# Patient Record
Sex: FEMALE | Race: WHITE | NOT HISPANIC OR LATINO | Employment: FULL TIME | URBAN - NONMETROPOLITAN AREA
[De-identification: names, ages, dates, MRNs, and addresses within clinical notes are randomized per-mention and may not be internally consistent; named-entity substitution may affect disease eponyms.]

---

## 2017-02-16 DIAGNOSIS — Z12.31 VISIT FOR SCREENING MAMMOGRAM: Primary | ICD-10-CM

## 2017-02-16 PROCEDURE — G0202 SCR MAMMO BI INCL CAD: HCPCS | Mod: TC | Performed by: RADIOLOGY

## 2017-02-24 DIAGNOSIS — R51.9 HEADACHE: ICD-10-CM

## 2017-02-27 RX ORDER — SUMATRIPTAN 100 MG/1
TABLET, FILM COATED ORAL
Qty: 10 TABLET | Refills: 1 | Status: SHIPPED | OUTPATIENT
Start: 2017-02-27 | End: 2017-05-01

## 2017-05-01 ENCOUNTER — OFFICE VISIT (OUTPATIENT)
Dept: FAMILY MEDICINE | Facility: OTHER | Age: 52
End: 2017-05-01
Attending: PHYSICIAN ASSISTANT
Payer: COMMERCIAL

## 2017-05-01 VITALS
HEIGHT: 61 IN | HEART RATE: 72 BPM | DIASTOLIC BLOOD PRESSURE: 78 MMHG | OXYGEN SATURATION: 98 % | SYSTOLIC BLOOD PRESSURE: 128 MMHG | WEIGHT: 161 LBS | BODY MASS INDEX: 30.4 KG/M2 | TEMPERATURE: 97.8 F

## 2017-05-01 DIAGNOSIS — H93.19 TINNITUS, UNSPECIFIED LATERALITY: ICD-10-CM

## 2017-05-01 DIAGNOSIS — Z12.11 SPECIAL SCREENING FOR MALIGNANT NEOPLASMS, COLON: ICD-10-CM

## 2017-05-01 DIAGNOSIS — Z11.59 NEED FOR HEPATITIS C SCREENING TEST: ICD-10-CM

## 2017-05-01 DIAGNOSIS — L30.9 PERIOCULAR DERMATITIS: ICD-10-CM

## 2017-05-01 DIAGNOSIS — G44.031 INTRACTABLE EPISODIC PAROXYSMAL HEMICRANIA: ICD-10-CM

## 2017-05-01 DIAGNOSIS — Z01.419 WELL WOMAN EXAM: Primary | ICD-10-CM

## 2017-05-01 DIAGNOSIS — Z71.89 ACP (ADVANCE CARE PLANNING): Chronic | ICD-10-CM

## 2017-05-01 DIAGNOSIS — E03.8 OTHER SPECIFIED HYPOTHYROIDISM: ICD-10-CM

## 2017-05-01 DIAGNOSIS — R42 VERTIGO: ICD-10-CM

## 2017-05-01 DIAGNOSIS — F32.0 MAJOR DEPRESSIVE DISORDER, SINGLE EPISODE, MILD (H): ICD-10-CM

## 2017-05-01 DIAGNOSIS — Z13.220 LIPID SCREENING: ICD-10-CM

## 2017-05-01 LAB
ALBUMIN SERPL-MCNC: 4 G/DL (ref 3.4–5)
ALBUMIN UR-MCNC: NEGATIVE MG/DL
ALP SERPL-CCNC: 100 U/L (ref 40–150)
ALT SERPL W P-5'-P-CCNC: 27 U/L (ref 0–50)
ANION GAP SERPL CALCULATED.3IONS-SCNC: 5 MMOL/L (ref 3–14)
APPEARANCE UR: CLEAR
AST SERPL W P-5'-P-CCNC: 17 U/L (ref 0–45)
BACTERIA #/AREA URNS HPF: ABNORMAL /HPF
BASOPHILS # BLD AUTO: 0.1 10E9/L (ref 0–0.2)
BASOPHILS NFR BLD AUTO: 0.8 %
BILIRUB SERPL-MCNC: 0.3 MG/DL (ref 0.2–1.3)
BILIRUB UR QL STRIP: NEGATIVE
BUN SERPL-MCNC: 10 MG/DL (ref 7–30)
CALCIUM SERPL-MCNC: 9.3 MG/DL (ref 8.5–10.1)
CHLORIDE SERPL-SCNC: 104 MMOL/L (ref 94–109)
CHOLEST SERPL-MCNC: 234 MG/DL
CO2 SERPL-SCNC: 30 MMOL/L (ref 20–32)
COLOR UR AUTO: ABNORMAL
CREAT SERPL-MCNC: 0.75 MG/DL (ref 0.52–1.04)
DIFFERENTIAL METHOD BLD: NORMAL
EOSINOPHIL # BLD AUTO: 0.5 10E9/L (ref 0–0.7)
EOSINOPHIL NFR BLD AUTO: 6.9 %
ERYTHROCYTE [DISTWIDTH] IN BLOOD BY AUTOMATED COUNT: 13 % (ref 10–15)
GFR SERPL CREATININE-BSD FRML MDRD: 81 ML/MIN/1.7M2
GLUCOSE SERPL-MCNC: 92 MG/DL (ref 70–99)
GLUCOSE UR STRIP-MCNC: NEGATIVE MG/DL
HCT VFR BLD AUTO: 42.1 % (ref 35–47)
HDLC SERPL-MCNC: 91 MG/DL
HGB BLD-MCNC: 14.2 G/DL (ref 11.7–15.7)
HGB UR QL STRIP: NEGATIVE
IMM GRANULOCYTES # BLD: 0 10E9/L (ref 0–0.4)
IMM GRANULOCYTES NFR BLD: 0.4 %
KETONES UR STRIP-MCNC: NEGATIVE MG/DL
LDLC SERPL CALC-MCNC: 110 MG/DL
LEUKOCYTE ESTERASE UR QL STRIP: NEGATIVE
LYMPHOCYTES # BLD AUTO: 2 10E9/L (ref 0.8–5.3)
LYMPHOCYTES NFR BLD AUTO: 28.6 %
MCH RBC QN AUTO: 29.6 PG (ref 26.5–33)
MCHC RBC AUTO-ENTMCNC: 33.7 G/DL (ref 31.5–36.5)
MCV RBC AUTO: 88 FL (ref 78–100)
MONOCYTES # BLD AUTO: 0.6 10E9/L (ref 0–1.3)
MONOCYTES NFR BLD AUTO: 8.8 %
NEUTROPHILS # BLD AUTO: 3.8 10E9/L (ref 1.6–8.3)
NEUTROPHILS NFR BLD AUTO: 54.5 %
NITRATE UR QL: NEGATIVE
NONHDLC SERPL-MCNC: 143 MG/DL
NRBC # BLD AUTO: 0 10*3/UL
NRBC BLD AUTO-RTO: 0 /100
PH UR STRIP: 6 PH (ref 4.7–8)
PLATELET # BLD AUTO: 304 10E9/L (ref 150–450)
POTASSIUM SERPL-SCNC: 3.9 MMOL/L (ref 3.4–5.3)
PROT SERPL-MCNC: 8.2 G/DL (ref 6.8–8.8)
RBC # BLD AUTO: 4.79 10E12/L (ref 3.8–5.2)
RBC #/AREA URNS AUTO: <1 /HPF (ref 0–2)
SODIUM SERPL-SCNC: 139 MMOL/L (ref 133–144)
SP GR UR STRIP: 1.01 (ref 1–1.03)
TRIGL SERPL-MCNC: 163 MG/DL
TSH SERPL DL<=0.005 MIU/L-ACNC: 0.85 MU/L (ref 0.4–4)
URN SPEC COLLECT METH UR: ABNORMAL
UROBILINOGEN UR STRIP-MCNC: NORMAL MG/DL (ref 0–2)
WBC # BLD AUTO: 7.1 10E9/L (ref 4–11)
WBC #/AREA URNS AUTO: <1 /HPF (ref 0–2)

## 2017-05-01 PROCEDURE — 80061 LIPID PANEL: CPT | Performed by: PHYSICIAN ASSISTANT

## 2017-05-01 PROCEDURE — 36415 COLL VENOUS BLD VENIPUNCTURE: CPT | Performed by: PHYSICIAN ASSISTANT

## 2017-05-01 PROCEDURE — 86803 HEPATITIS C AB TEST: CPT | Mod: 90 | Performed by: PHYSICIAN ASSISTANT

## 2017-05-01 PROCEDURE — 99000 SPECIMEN HANDLING OFFICE-LAB: CPT | Performed by: PHYSICIAN ASSISTANT

## 2017-05-01 PROCEDURE — 81001 URINALYSIS AUTO W/SCOPE: CPT | Performed by: PHYSICIAN ASSISTANT

## 2017-05-01 PROCEDURE — 99396 PREV VISIT EST AGE 40-64: CPT | Performed by: PHYSICIAN ASSISTANT

## 2017-05-01 PROCEDURE — 80050 GENERAL HEALTH PANEL: CPT | Performed by: PHYSICIAN ASSISTANT

## 2017-05-01 RX ORDER — SUMATRIPTAN 100 MG/1
TABLET, FILM COATED ORAL
Qty: 10 TABLET | Refills: 1 | Status: SHIPPED | OUTPATIENT
Start: 2017-05-01 | End: 2018-03-02

## 2017-05-01 RX ORDER — NEOMYCIN SULFATE, POLYMYXIN B SULFATE, AND DEXAMETHASONE 3.5; 10000; 1 MG/G; [USP'U]/G; MG/G
OINTMENT OPHTHALMIC
Qty: 2 TUBE | Refills: 1 | Status: SHIPPED | OUTPATIENT
Start: 2017-05-01 | End: 2020-07-30

## 2017-05-01 ASSESSMENT — ANXIETY QUESTIONNAIRES
2. NOT BEING ABLE TO STOP OR CONTROL WORRYING: NOT AT ALL
IF YOU CHECKED OFF ANY PROBLEMS ON THIS QUESTIONNAIRE, HOW DIFFICULT HAVE THESE PROBLEMS MADE IT FOR YOU TO DO YOUR WORK, TAKE CARE OF THINGS AT HOME, OR GET ALONG WITH OTHER PEOPLE: NOT DIFFICULT AT ALL
1. FEELING NERVOUS, ANXIOUS, OR ON EDGE: NOT AT ALL
3. WORRYING TOO MUCH ABOUT DIFFERENT THINGS: NOT AT ALL
7. FEELING AFRAID AS IF SOMETHING AWFUL MIGHT HAPPEN: NOT AT ALL
GAD7 TOTAL SCORE: 0
6. BECOMING EASILY ANNOYED OR IRRITABLE: NOT AT ALL
5. BEING SO RESTLESS THAT IT IS HARD TO SIT STILL: NOT AT ALL

## 2017-05-01 ASSESSMENT — PAIN SCALES - GENERAL: PAINLEVEL: NO PAIN (0)

## 2017-05-01 ASSESSMENT — PATIENT HEALTH QUESTIONNAIRE - PHQ9: 5. POOR APPETITE OR OVEREATING: NOT AT ALL

## 2017-05-01 ASSESSMENT — ENCOUNTER SYMPTOMS: DIZZINESS: 1

## 2017-05-01 NOTE — MR AVS SNAPSHOT
After Visit Summary   5/1/2017    Mattie Edwards    MRN: [id number]           Patient Information     Date Of Birth          1965        Visit Information        Provider Department      5/1/2017 11:00 AM Tracey Leary PA [facility]        Today's Diagnoses     Well woman exam    -  1    ACP (advance care planning)        Vertigo        Tinnitus, unspecified laterality        Lipid screening        Special screening for malignant neoplasms, colon        Need for hepatitis C screening test        Intractable episodic paroxysmal hemicrania        Periocular dermatitis        hypothyroidism        Major depressive disorder, single episode, mild (H)          Care Instructions      Preventive Health Recommendations  Female Ages 50 - 64    Yearly exam: See your health care provider every year in order to  o Review health changes.   o Discuss preventive care.    o Review your medicines if your doctor has prescribed any.      Get a Pap test every three years (unless you have an abnormal result and your provider advises testing more often).    If you get Pap tests with HPV test, you only need to test every 5 years, unless you have an abnormal result.     You do not need a Pap test if your uterus was removed (hysterectomy) and you have not had cancer.    You should be tested each year for STDs (sexually transmitted diseases) if you're at risk.     Have a mammogram every 1 to 2 years.    Have a colonoscopy at age 50, or have a yearly FIT test (stool test). These exams screen for colon cancer.      Have a cholesterol test every 5 years, or more often if advised.    Have a diabetes test (fasting glucose) every three years. If you are at risk for diabetes, you should have this test more often.     If you are at risk for osteoporosis (brittle bone disease), think about having a bone density scan (DEXA).    Shots: Get a flu shot each year. Get a tetanus shot every 10 years.    Nutrition:      Eat at least 5 servings of fruits and vegetables each day.    Eat whole-grain bread, whole-wheat pasta and brown rice instead of white grains and rice.    Talk to your provider about Calcium and Vitamin D.     Lifestyle    Exercise at least 150 minutes a week (30 minutes a day, 5 days a week). This will help you control your weight and prevent disease.    Limit alcohol to one drink per day.    No smoking.     Wear sunscreen to prevent skin cancer.     See your dentist every six months for an exam and cleaning.    See your eye doctor every 1 to 2 years.          Follow-ups after your visit        Additional Services     OTOLARYNGOLOGY REFERRAL       Your provider has referred you to: Srini Bhatt [phone]   [email or web address]    Please be aware that coverage of these services is subject to the terms and limitations of your health insurance plan.  Call member services at your health plan with any benefit or coverage questions.      Please bring the following with you to your appointment:    (1) Any X-Rays, CTs or MRIs which have been performed.  Contact the facility where they were done to arrange for  prior to your scheduled appointment.   (2) List of current medications  (3) This referral request   (4) Any documents/labs given to you for this referral                  Future tests that were ordered for you today     Open Future Orders        Priority Expected Expires Ordered    Immunos occult blood Routine  5/1/2018 5/1/2017            Who to contact     If you have questions or need follow up information about today's clinic visit or your schedule please contact [facility] directly at [phone].  Normal or non-critical lab and imaging results will be communicated to you by MyChart, letter or phone within 4 business days after the clinic has received the results. If you do not hear from us within 7 days, please  "contact the clinic through Wercker or phone. If you have a critical or abnormal lab result, we will notify you by phone as soon as possible.  Submit refill requests through Wercker or call your pharmacy and they will forward the refill request to us. Please allow 3 business days for your refill to be completed.          Additional Information About Your Visit        PhoneAndPhoneharAdvise Only Information     Wercker gives you secure access to your electronic health record. If you see a primary care provider, you can also send messages to your care team and make appointments. If you have questions, please call your primary care clinic.  If you do not have a primary care provider, please call [phone] and they will assist you.        Care EveryWhere ID     This is your Care EveryWhere ID. This could be used by other organizations to access your [facility] medical records  [id number]        Your Vitals Were     Pulse Temperature Height Last Period Pulse Oximetry Breastfeeding?    72 97.8  F (36.6  C) (Tympanic) 5' 1\" (1.549 m) 05/15/2015 98% No    BMI (Body Mass Index)                   30.42 kg/m2            Blood Pressure from Last 3 Encounters:   05/01/17 128/78   12/16/16 118/80   11/23/16 120/70    Weight from Last 3 Encounters:   05/01/17 161 lb (73 kg)   12/16/16 149 lb (67.6 kg)   11/23/16 155 lb (70.3 kg)              We Performed the Following     CBC with platelets differential     Comprehensive metabolic panel     Hepatitis C antibody     Lipid Profile     MR Brain w Contrast     OTOLARYNGOLOGY REFERRAL     TSH with free T4 reflex     UA with Microscopic reflex to Culture          Today's Medication Changes          These changes are accurate as of: 5/1/17 11:56 AM.  If you have any questions, ask your nurse or doctor.               These medicines have changed or have updated prescriptions.        Dose/Directions    SUMAtriptan 100 MG tablet   Commonly known as:  IMITREX   This may have changed:  See the new " instructions.   Used for:  Intractable episodic paroxysmal hemicrania   Changed by:  Tracey Leary PA        TAKE ONE TABLET BY MOUTH AS NEEDED   Quantity:  10 tablet   Refills:  1            Where to get your medicines      These medications were sent to [id number] [place] [facility] - [address] AT [facility] [address], [place] [address]     Phone:  [phone]     neomycin-polymyxin-dexamethasone 3.5-31146-3.1 Oint ophthalmic ointment    sertraline 50 MG tablet    SUMAtriptan 100 MG tablet                Primary Care Provider Office Phone # Fax #    MUKESH Wallis [phone]       [facility] [address] [facility] [address]  [place] [address]        Thank you!     Thank you for choosing [facility]  for your care. Our goal is always to provide you with excellent care. Hearing back from our patients is one way we can continue to improve our services. Please take a few minutes to complete the written survey that you may receive in the mail after your visit with us. Thank you!             Your Updated Medication List - Protect others around you: Learn how to safely use, store and throw away your medicines at www.disposemymeds.org.          This list is accurate as of: 5/1/17 11:56 AM.  Always use your most recent med list.                   Brand Name Dispense Instructions for use    Bernard THYROID 30 MG tablet   Generic drug:  thyroid     90 tablet    TAKE 1 TABLET(30 MG) BY MOUTH DAILY       diazepam 2 MG tablet    VALIUM    20 tablet    Take 1 tablet (2 mg) by mouth every 12 hours as needed for anxiety or sleep       loratadine 10 MG tablet    CLARITIN    30 tablet    Take 1 tablet (10 mg) by mouth daily       meclizine 25 MG tablet    ANTIVERT    15 tablet    Take 1 tablet (25 mg) by mouth 3 times daily as needed for dizziness       mometasone 50 MCG/ACT spray    NASONEX    1 Box    Spray 2 sprays into both nostrils daily        neomycin-polymyxin-dexamethasone 3.5-23307-2.1 Oint ophthalmic ointment    MAXITROL    2 Tube    Apply 1/2 ribbon to periocular area 3-4 times a day for one week, then use as needed for severe itching       pimecrolimus 1 % cream    ELIDEL    60 g    Apply topically 2 times daily       sertraline 50 MG tablet    ZOLOFT    90 tablet    Take 1 tablet (50 mg) by mouth daily       SUMAtriptan 100 MG tablet    IMITREX    10 tablet    TAKE ONE TABLET BY MOUTH AS NEEDED       ZOLPIDEM TARTRATE PO      Take by mouth nightly as needed for sleep

## 2017-05-01 NOTE — PROGRESS NOTES
SUBJECTIVE:     CC: Mattie Edwards is an 51 year old woman who presents for preventive health visit.     Physical   Annual:     Getting at least 3 servings of Calcium per day::  Yes    Bi-annual eye exam::  Yes    Dental care twice a year::  Yes    Sleep apnea or symptoms of sleep apnea::  Daytime drowsiness and Excessive snoring    Diet::  Regular (no restrictions)    Frequency of exercise::  2-3 days/week    Duration of exercise::  15-30 minutes    Taking medications regularly::  Yes    Medication side effects::  Not applicable    Additional concerns today::  YES  Dizziness           Dizziness      Duration: 1 year     Description   Feeling faint:  YES- eyes are not focusing   Feeling like the surroundings are moving: YES  Loss of consciousness or falls: no     Intensity:  moderate    Accompanying signs and symptoms:   Nausea/vomitting: YES  Palpitations: no   Weakness in arms or legs: no   Vision or speech changes: YES- vision   Ringing in ears (Tinnitus): YES  Hearing loss related to dizziness: no   Other (fevers/chills/sweating/dyspnea): no     History (similar episodes/head trauma/previous evaluation/recent bleeding): Started last May 2016    Precipitating or alleviating factors (new meds/chemicals): happens in the afternoon after eating   Worse with activity/head movement: YES    Therapies tried and outcome: None    Today's PHQ-2 Score:   PHQ-2 ( 1999 Pfizer) 4/28/2017   Q1: Little interest or pleasure in doing things -   Q2: Feeling down, depressed or hopeless -   PHQ-2 Score -   Little interest or pleasure in doing things Not at all   Feeling down, depressed or hopeless Not at all   PHQ-2 Score 0       Abuse: Current or Past(Physical, Sexual or Emotional)- No  Do you feel safe in your environment - Yes    Social History   Substance Use Topics     Smoking status: Never Smoker     Smokeless tobacco: Never Used      Comment: no passive exposure     Alcohol use No     The patient does not drink >3 drinks  per day nor >7 drinks per week.    Recent Labs   Lab Test  01/28/16   1046  12/04/14   1132  10/03/13   0830   CHOL  275*  272*  232*   HDL  102  125  98*   LDL  158*  125  115   TRIG  75  110  97   CHOLHDLRATIO   --   2.2  2.4*   NHDL  173*   --    --        Reviewed orders with patient.  Reviewed health maintenance and updated orders accordingly - Yes    Mammo Decision Support:  Patient over age 50, mutual decision to screen reflected in health maintenance.    Pertinent mammograms are reviewed under the imaging tab.  History of abnormal Pap smear: NO - age 30-65 PAP every 5 years with negative HPV co-testing recommended    Reviewed and updated as needed this visit by clinical staff  Tobacco  Allergies  Meds  Med Hx  Surg Hx  Fam Hx  Soc Hx        Reviewed and updated as needed this visit by Provider        Past Medical History:   Diagnosis Date     Major depressive disorder, single episode, mild (H) 9/19/2011     Thyroid disease       Past Surgical History:   Procedure Laterality Date     NO HISTORY OF SURGERY         ROS:  C: NEGATIVE for fever, chills, change in weight  I: NEGATIVE for worrisome rashes, moles or lesions  E: NEGATIVE for vision changes or irritation  ENT: NEGATIVE for ear, mouth and throat problems  R: NEGATIVE for significant cough or SOB  B: NEGATIVE for masses, tenderness or discharge  CV: NEGATIVE for chest pain, palpitations or peripheral edema  GI: NEGATIVE for nausea, abdominal pain, heartburn, or change in bowel habits  : NEGATIVE for unusual urinary or vaginal symptoms. No vaginal bleeding.  M: NEGATIVE for significant arthralgias or myalgia  NEURO: dizziness is present on and off all year over 5 episodes that are debilitating.   Different type of vertigo than when she was on thyroid medications.     E: NEGATIVE for temperature intolerance, skin/hair changes  H: NEGATIVE for bleeding problems  P: NEGATIVE for changes in mood or affect     Problem list, Medication list, Allergies,  and Medical/Social/Surgical histories reviewed in [facility] and updated as appropriate.  Labs reviewed in EPIC  BP Readings from Last 3 Encounters:   05/01/17 128/78   12/16/16 118/80   11/23/16 120/70    Wt Readings from Last 3 Encounters:   05/01/17 161 lb (73 kg)   12/16/16 149 lb (67.6 kg)   11/23/16 155 lb (70.3 kg)                  Patient Active Problem List   Diagnosis     Major depressive disorder, single episode, mild (H)     Left Thyroid nodules     hypothyroidism     Perennial allergic rhinitis     Chronic maxillary sinusitis     Chronic ethmoidal sinusitis     Nasal turbinate hypertrophy     ACP (advance care planning)     Past Surgical History:   Procedure Laterality Date     NO HISTORY OF SURGERY         Social History   Substance Use Topics     Smoking status: Never Smoker     Smokeless tobacco: Never Used      Comment: no passive exposure     Alcohol use No     Family History   Problem Relation Age of Onset     CANCER Father      brain; cause of death     Myocardial Infarction Paternal Grandfather      Myocardial Infarction Maternal Grandmother      Thyroid Cancer Cousin      Hypertension Other      family history     Sleep Apnea Other      family history     Seasonal/Environmental Allergies Other      family history     Snoring Other      family history         Current Outpatient Prescriptions   Medication Sig Dispense Refill     SUMAtriptan (IMITREX) 100 MG tablet TAKE ONE TABLET BY MOUTH AS NEEDED 10 tablet 1     ARMOUR THYROID 30 MG tablet TAKE 1 TABLET(30 MG) BY MOUTH DAILY 90 tablet 1     meclizine (ANTIVERT) 25 MG tablet Take 1 tablet (25 mg) by mouth 3 times daily as needed for dizziness 15 tablet 0     mometasone (NASONEX) 50 MCG/ACT nasal spray Spray 2 sprays into both nostrils daily 1 Box 11     diazepam (VALIUM) 2 MG tablet Take 1 tablet (2 mg) by mouth every 12 hours as needed for anxiety or sleep 20 tablet 0     neomycin-polymyxin-dexamethasone (MAXITROL) 3.5-40253-8.1 OINT ophthalmic  "ointment Apply 1/2 ribbon to periocular area 3-4 times a day for one week, then use as needed for severe itching 2 Tube 1     sertraline (ZOLOFT) 50 MG tablet Take 1 tablet (50 mg) by mouth daily 90 tablet 3     ZOLPIDEM TARTRATE PO Take by mouth nightly as needed for sleep       loratadine (CLARITIN) 10 MG tablet Take 1 tablet (10 mg) by mouth daily (Patient taking differently: Take 10 mg by mouth daily ) 30 tablet 1     pimecrolimus (ELIDEL) 1 % cream Apply topically 2 times daily 60 g 0     No Known Allergies  OBJECTIVE:     /78 (BP Location: Left arm, Patient Position: Chair, Cuff Size: Adult Large)  Pulse 72  Temp 97.8  F (36.6  C) (Tympanic)  Ht 5' 1\" (1.549 m)  Wt 161 lb (73 kg)  LMP 05/15/2015  SpO2 98%  Breastfeeding? No  BMI 30.42 kg/m2  EXAM:  GENERAL APPEARANCE: healthy, alert and no distress  EYES: Eyes grossly normal to inspection, PERRL and conjunctivae and sclerae normal  HENT: ear canals and TM's normal, nose and mouth without ulcers or lesions, oropharynx clear and oral mucous membranes moist  NECK: no adenopathy, no asymmetry, masses, or scars and thyroid normal to palpation  RESP: lungs clear to auscultation - no rales, rhonchi or wheezes  BREAST: normal without masses, tenderness or nipple discharge and no palpable axillary masses or adenopathy  CV: regular rate and rhythm, normal S1 S2, no S3 or S4, no murmur, click or rub, no peripheral edema and peripheral pulses strong  ABDOMEN: soft, nontender, no hepatosplenomegaly, no masses and bowel sounds normal  MS: no musculoskeletal defects are noted and gait is age appropriate without ataxia  SKIN: no suspicious lesions or rashes  NEURO: Normal strength and tone, sensory exam grossly normal, mentation intact and speech normal  PSYCH: mentation appears normal and affect normal/bright, Discussion  on stress at work.      ASSESSMENT/PLAN:     1. ACP (advance care planning)  Health care directive she has will get us a copy to file.     2. " Well woman exam  Mammogram is ok. Willing to do IFOB.  discussion on calcium and Vit D due to recent.   Menopause.   - Comprehensive metabolic panel  - Lipid Profile  - CBC with platelets differential  - UA with Microscopic reflex to Culture    3. Vertigo  still an issue.  See ENT again and we will get MRI.   - MR Brain w Contrast; Future  - MR Brain w Contrast  - Comprehensive metabolic panel  - TSH with free T4 reflex  - CBC with platelets differential    4. Tinnitus, unspecified laterality  She is going to be given below.  See ENT again.  This is a different type of vertigo and also associated with tinnitus when it is happening.  Consider Meniere   - MR Brain w Contrast; Future  - MR Brain w Contrast  - Comprehensive metabolic panel  - TSH with free T4 reflex  - CBC with platelets differential    5. Lipid screening  Due for this.   - Lipid Profile    6. Special screening for malignant neoplasms, colon  Given.   - Immunos occult blood; Future    7. Need for hepatitis C screening test  Done.   - Hepatitis C antibody    8. Intractable episodic paroxysmal hemicrania  Refill, better in Menopause.   - SUMAtriptan (IMITREX) 100 MG tablet; TAKE ONE TABLET BY MOUTH AS NEEDED  Dispense: 10 tablet; Refill: 1    9. Periocular dermatitis  Refill given.   - neomycin-polymyxin-dexamethasone (MAXITROL) 3.5-11970-7.1 OINT ophthalmic ointment; Apply 1/2 ribbon to periocular area 3-4 times a day for one week, then use as needed for severe itching  Dispense: 2 Tube; Refill: 1    10. hypothyroidism  Recheck on [place] Thyroid tolerated better.   - TSH with free T4 reflex    11. Major depressive disorder, single episode, mild (H)  Refill. Helping menopausal sx.   - sertraline (ZOLOFT) 50 MG tablet; Take 1 tablet (50 mg) by mouth daily  Dispense: 90 tablet; Refill: 3              COUNSELING:  Reviewed preventive health counseling, as reflected in patient instructions       Regular exercise       Healthy diet/nutrition       Vision  "screening       Hearing screening       Colon cancer screening       Consider Hep C screening for patients born between 1945 and 1965       (Dominique)menopause management       Advance Care Planning         reports that she has never smoked. She has never used smokeless tobacco.    Estimated body mass index is 30.42 kg/(m^2) as calculated from the following:    Height as of this encounter: 5' 1\" (1.549 m).    Weight as of this encounter: 161 lb (73 kg).       Counseling Resources:  ATP IV Guidelines  Pooled Cohorts Equation Calculator  Breast Cancer Risk Calculator  FRAX Risk Assessment  ICSI Preventive Guidelines  Dietary Guidelines for Americans, 2010  [employer]'s MyPlate  ASA Prophylaxis  Lung CA Screening    MUKESH Leon  [facility] [place]  Answers for HPI/ROS submitted by the patient on 4/28/2017   Q1: Little interest or pleasure in doing things: 0=Not at all  Q2: Feeling down, depressed or hopeless: 0=Not at all  PHQ-2 Score: 0    "

## 2017-05-02 ASSESSMENT — ANXIETY QUESTIONNAIRES: GAD7 TOTAL SCORE: 0

## 2017-05-02 ASSESSMENT — PATIENT HEALTH QUESTIONNAIRE - PHQ9: SUM OF ALL RESPONSES TO PHQ QUESTIONS 1-9: 7

## 2017-05-03 DIAGNOSIS — H91.93 DECREASED HEARING OF BOTH EARS: Primary | ICD-10-CM

## 2017-05-03 DIAGNOSIS — R42 VERTIGO: Primary | ICD-10-CM

## 2017-05-03 DIAGNOSIS — H93.19 TINNITUS, UNSPECIFIED LATERALITY: ICD-10-CM

## 2017-05-03 LAB — HCV AB SERPL QL IA: NORMAL

## 2017-05-04 DIAGNOSIS — H91.93 DECREASED HEARING, BILATERAL: Primary | ICD-10-CM

## 2017-05-08 ENCOUNTER — HOSPITAL ENCOUNTER (OUTPATIENT)
Dept: MRI IMAGING | Facility: HOSPITAL | Age: 52
Discharge: HOME | End: 2017-05-08
Attending: PHYSICIAN ASSISTANT | Admitting: PHYSICIAN ASSISTANT
Payer: COMMERCIAL

## 2017-05-08 PROCEDURE — A9585 GADOBUTROL INJECTION: HCPCS | Mod: TC

## 2017-05-08 PROCEDURE — 70553 MRI BRAIN STEM W/O & W/DYE: CPT | Mod: TC

## 2017-05-08 RX ORDER — GADOBUTROL 604.72 MG/ML
7.5 INJECTION INTRAVENOUS ONCE
Status: COMPLETED | OUTPATIENT
Start: 2017-05-08 | End: 2017-05-08

## 2017-05-08 RX ADMIN — GADOBUTROL 7.5 ML: 604.72 INJECTION INTRAVENOUS at 12:58

## 2017-05-09 DIAGNOSIS — Z12.11 SPECIAL SCREENING FOR MALIGNANT NEOPLASMS, COLON: ICD-10-CM

## 2017-05-09 PROCEDURE — 82274 ASSAY TEST FOR BLOOD FECAL: CPT | Performed by: PHYSICIAN ASSISTANT

## 2017-05-11 LAB — HEMOCCULT SP1 STL QL: NEGATIVE

## 2017-06-02 ENCOUNTER — OFFICE VISIT (OUTPATIENT)
Dept: AUDIOLOGY | Facility: OTHER | Age: 52
End: 2017-06-02
Attending: AUDIOLOGIST
Payer: COMMERCIAL

## 2017-06-02 ENCOUNTER — OFFICE VISIT (OUTPATIENT)
Dept: OTOLARYNGOLOGY | Facility: OTHER | Age: 52
End: 2017-06-02
Attending: AUDIOLOGIST
Payer: COMMERCIAL

## 2017-06-02 VITALS
SYSTOLIC BLOOD PRESSURE: 118 MMHG | BODY MASS INDEX: 28.32 KG/M2 | OXYGEN SATURATION: 97 % | WEIGHT: 150 LBS | HEIGHT: 61 IN | DIASTOLIC BLOOD PRESSURE: 76 MMHG | TEMPERATURE: 98.1 F | HEART RATE: 76 BPM

## 2017-06-02 DIAGNOSIS — H93.13 TINNITUS, BILATERAL: ICD-10-CM

## 2017-06-02 DIAGNOSIS — R42 DIZZINESS: ICD-10-CM

## 2017-06-02 DIAGNOSIS — J30.89 PERENNIAL ALLERGIC RHINITIS, UNSPECIFIED ALLERGIC RHINITIS TRIGGER: ICD-10-CM

## 2017-06-02 DIAGNOSIS — R42 VERTIGO: ICD-10-CM

## 2017-06-02 DIAGNOSIS — H93.13 TINNITUS, BILATERAL: Primary | ICD-10-CM

## 2017-06-02 PROCEDURE — 99214 OFFICE O/P EST MOD 30 MIN: CPT | Mod: 25 | Performed by: PHYSICIAN ASSISTANT

## 2017-06-02 PROCEDURE — 92570 ACOUSTIC IMMITANCE TESTING: CPT | Performed by: AUDIOLOGIST

## 2017-06-02 PROCEDURE — 92557 COMPREHENSIVE HEARING TEST: CPT | Performed by: AUDIOLOGIST

## 2017-06-02 PROCEDURE — 92504 EAR MICROSCOPY EXAMINATION: CPT | Performed by: PHYSICIAN ASSISTANT

## 2017-06-02 ASSESSMENT — PAIN SCALES - GENERAL: PAINLEVEL: NO PAIN (0)

## 2017-06-02 NOTE — MR AVS SNAPSHOT
After Visit Summary   6/2/2017    Mattie Edwards    MRN: [id number]           Patient Information     Date Of Birth          1965        Visit Information        Provider Department      6/2/2017 8:15 AM Sharon Bazzi AuD [facility]        Today's Diagnoses     Tinnitus, bilateral        Dizziness           Follow-ups after your visit        Your next 10 appointments already scheduled     Jun 02, 2017  8:45 AM CDT   (Arrive by 8:30 AM)   Return Visit with Fariba Lara PA-C   [facility] ([facility])    [address] [place] [address] [place]  [facility] [id number] [phone]            Jun 07, 2017 10:00 AM CDT   Evaluation with Fariba Tan PT   [facility] ([facility] )    [address] [id number] [phone]              Who to contact     If you have questions or need follow up information about today's clinic visit or your schedule please contact [facility] directly at [phone].  Normal or non-critical lab and imaging results will be communicated to you by [email or web address], letter or phone within 4 business days after the clinic has received the results. If you do not hear from us within 7 days, please contact the clinic through [email or web address] or phone. If you have a critical or abnormal lab result, we will notify you by phone as soon as possible.  Submit refill requests through Lipperhey or call your pharmacy and they will forward the refill request to us. Please allow 3 business days for your refill to be completed.          Additional Information About Your Visit        MyChart Information     Lipperhey gives you secure access to your electronic health record. If you see a primary care provider, you can also send messages to your care team and make appointments. If you have questions, please call your primary care clinic.  If you do not have a primary care provider, please call [phone] and they will assist you.        Care  EveryWhere ID     This is your Care EveryWhere ID. This could be used by other organizations to access your [facility] medical records  [id number]        Your Vitals Were     Last Period                   05/15/2015            Blood Pressure from Last 3 Encounters:   05/01/17 128/78   12/16/16 118/80   11/23/16 120/70    Weight from Last 3 Encounters:   05/01/17 161 lb (73 kg)   12/16/16 149 lb (67.6 kg)   11/23/16 155 lb (70.3 kg)              We Performed the Following     AUDIOGRAM/TYMPANOGRAM - INTERFACE        Primary Care Provider Office Phone # Fax #    MUKESH Wallis [phone]       [facility] [address] [address]        Thank you!     Thank you for choosing [facility]  for your care. Our goal is always to provide you with excellent care. Hearing back from our patients is one way we can continue to improve our services. Please take a few minutes to complete the written survey that you may receive in the mail after your visit with us. Thank you!             Your Updated Medication List - Protect others around you: Learn how to safely use, store and throw away your medicines at www.disposemymeds.org.          This list is accurate as of: 6/2/17  8:41 AM.  Always use your most recent med list.                   Brand Name Dispense Instructions for use    ARMOUR THYROID 30 MG tablet   Generic drug:  thyroid     90 tablet    TAKE 1 TABLET(30 MG) BY MOUTH DAILY       diazepam 2 MG tablet    VALIUM    20 tablet    Take 1 tablet (2 mg) by mouth every 12 hours as needed for anxiety or sleep       loratadine 10 MG tablet    CLARITIN    30 tablet    Take 1 tablet (10 mg) by mouth daily       meclizine 25 MG tablet    ANTIVERT    15 tablet    Take 1 tablet (25 mg) by mouth 3 times daily as needed for dizziness       mometasone 50 MCG/ACT spray    NASONEX    1 Box    Spray 2 sprays into both nostrils daily       neomycin-polymyxin-dexamethasone  3.5-84821-9.1 Oint ophthalmic ointment    MAXITROL    2 Tube    Apply 1/2 ribbon to periocular area 3-4 times a day for one week, then use as needed for severe itching       pimecrolimus 1 % cream    ELIDEL    60 g    Apply topically 2 times daily       sertraline 50 MG tablet    ZOLOFT    90 tablet    Take 1 tablet (50 mg) by mouth daily       SUMAtriptan 100 MG tablet    IMITREX    10 tablet    TAKE ONE TABLET BY MOUTH AS NEEDED       ZOLPIDEM TARTRATE PO      Take by mouth nightly as needed for sleep

## 2017-06-02 NOTE — PROGRESS NOTES
Chief Complaint   Patient presents with     Consult     vertigo and tinnitus- sameer Phelps returns for complaints of dizzines/ vertigo/ tinnitus. She was last seen last year in March for similar complaints. She has ongoing symptoms, overall no improvement. She was seen in ED in May 2016 with rotarty vertigo and was treated with Valium and Zofran. Since, she has episodes of acute onset. She has felt spinnig comes on all of a sudden. Reports some balance issues as well.   ED work up was negative.   She has had a a spell about 1 times every 2 months. She had developed rotarty vertigo, she has symptoms taht last for hours and then pass. No monique provoking factors.   She has associated nausea and emesis related.   She has noticed change in hearing, tinnitus. She has some aural fullness however related to allergies likely per patient.   Describes a 'sick feeling.'   No COM or otologic surgeries.     Caffeine intake- 2 cans pop daily  ETOH- rarely/ social  Tobacco- None  Sodium- unknown.       Audiogram  Type A tympanograms.   Thresholds are within normal range. Excellent word discrim.     Past Medical History:   Diagnosis Date     Major depressive disorder, single episode, mild (H) 9/19/2011     Thyroid disease       No Known Allergies  Current Outpatient Prescriptions   Medication     ARMOUR THYROID 30 MG tablet     SUMAtriptan (IMITREX) 100 MG tablet     neomycin-polymyxin-dexamethasone (MAXITROL) 3.5-84369-5.1 OINT ophthalmic ointment     sertraline (ZOLOFT) 50 MG tablet     meclizine (ANTIVERT) 25 MG tablet     mometasone (NASONEX) 50 MCG/ACT nasal spray     diazepam (VALIUM) 2 MG tablet     ZOLPIDEM TARTRATE PO     loratadine (CLARITIN) 10 MG tablet     pimecrolimus (ELIDEL) 1 % cream     No current facility-administered medications for this visit.       ROS: 10 point ROS neg other than the symptoms noted above in the HPI.  /76 (BP Location: Left arm, Patient Position: Chair, Cuff Size: Adult Regular)  " Pulse 76  Temp 98.1  F (36.7  C) (Tympanic)  Ht 5' 1\" (1.549 m)  Wt 150 lb (68 kg)  LMP 05/15/2015  SpO2 97%  BMI 28.34 kg/m2    General - The patient is well nourished and well developed, and appears to have good nutritional status. Alert and oriented to person and place, answers questions and cooperates with examination appropriately.   Head and Face - Normocephalic and atraumatic, with no gross asymmetry noted. The facial nerve is intact, with strong symmetric movements.  Voice and Breathing - The patient was breathing comfortably without the use of accessory muscles. There was no wheezing, stridor, or stertor. The patients voice was clear and strong, and had appropriate pitch and quality.  Ears -The external auditory canals are patent, the tympanic membranes are intact without effusion, retraction or mass. Bony landmarks are intact. Ears examined under microscopy.   Eyes - Extraocular movements intact, and the pupils were reactive to light. Sclera were not icteric or injected, conjunctiva were pink and moist.  Mouth - Examination of the oral cavity showed pink, healthy oral mucosa. No lesions or ulcerations noted. The tongue was mobile and midline, and the dentition were in good condition.   Throat - The walls of the oropharynx were smooth, pink, moist, symmetric, and had no lesions or ulcerations. The tonsillar pillars and soft palate were symmetric. The uvula was midline on elevation.   Neck - Normal midline excursion of the laryngotracheal complex during swallowing. Full range of motion on passive movement. Palpation of the occipital, submental, submandibular, internal jugular chain, and supraclavicular nodes did not demonstrate any abnormal lymph nodes or masses. Palpation of the thyroid was irregular without palpable dominant nodule or goiter. The trachea was mobile and midline.  Nose - External contour is symmetric, no gross deflection or scars. Nasal mucosa edematous, severe ITH, MTH, The septum " was intact, No polyps, masses, or purulence noted on examination.  NEURO:  equal  strength, neg Romberg, DTR II/IV bilaterally (UE and LE), finger to nose normal, CN intact, ambulates without difficulty.  no focal deficits noted. Michael Hallpike Negative.        ASSESSMENT:    ICD-10-CM    1. Tinnitus, bilateral H93.13    2. Dizziness R42    3. Vertigo R42     Meniere   4. Perennial allergic rhinitis, unspecified allergic rhinitis trigger J30.89          WIll contact radiology to review past MRI to rule out inner ear  Spoke to Dr. Pina today (6/3/17 @1105 AM). Negative IAC.   Start Vestibular therapy.     Start daily Zyrtec or Claritin  Follow CATSS.   If no improvement with above, will start low dose diuretic. I discussed with Mattie, to complete VT testing to ensure no BPPV present. I do not appreciate a positive Michael Hallpike today, but would recommend further testing.   Follow up in 3-4 weeks She will start following the CATSS at this time. Reviewed with patient and eduction provided. She does have normal hearing, but several symptoms of Meniere's noted.    I spent the remainder of today's visit educating the patient about the current theory on the cause of Meniere's Disease and the reasoning behind its treatment.  We discussed the CATS (Caffeine, Alcohol, Tobacco, Salt/Stress) Avoidance Diet, as well as safety measures to be done at home to avoid injury.  Finally, the variability, potential permanent hearing loss, and natural course of Meniere's disease, including 30% incidence of eventual bilateral involvement, was also discussed.    The use of Dyazide as well as Potassium replacement was also discussed as the next step in treatment.  The patient expressed understanding of today's discussion, and will follow up in 1 month.  I plan on doing audiograms every 6 months for the first 2 years, then yearly assuming they remain stable.    The last item discussed was the possible indications for an MRI scan of  the brain and internal auditory canals.  Although the chances of unilateral hearing loss and vertigo eventually being diagnosed as a vestibular schwannoma or other intracranial pathology was quoted as being approximately 1 in 400, consideration of an MRI in the situation of persistent or worsening symptoms is still possible necessary.      Fariba Lara PA-C  ENT  [facility], [place]  [phone]

## 2017-06-02 NOTE — NURSING NOTE
"Chief Complaint   Patient presents with     Consult     vertigo and tinnitus- sameer       Initial /76 (BP Location: Left arm, Patient Position: Chair, Cuff Size: Adult Regular)  Pulse 76  Temp 98.1  F (36.7  C) (Tympanic)  Ht 5' 1\" (1.549 m)  Wt 150 lb (68 kg)  LMP 05/15/2015  SpO2 97%  BMI 28.34 kg/m2 Estimated body mass index is 28.34 kg/(m^2) as calculated from the following:    Height as of this encounter: 5' 1\" (1.549 m).    Weight as of this encounter: 150 lb (68 kg).  Medication Reconciliation: complete       Nuzhat Lockhart LPN      "

## 2017-06-02 NOTE — MR AVS SNAPSHOT
After Visit Summary   6/2/2017    Mattie Edwards    MRN: [id number]           Patient Information     Date Of Birth          1965        Visit Information        Provider Department      6/2/2017 8:45 AM Fariba Lara PA-C [facility]        Today's Diagnoses     Tinnitus, bilateral    -  1      Care Instructions    WIll contact radiology to review past MRI to rule out inner ear  Start Vestibular therapy.     Start daily Zyrtec or Claritin  Follow CATSS.   If no improvement with above, will start low dose diuretic.   If there are concerns or questions, Call [phone] and ask for nurse      I spent the remainder of today's visit educating the patient about the current theory on the cause of Meniere's Disease and the reasoning behind its treatment.  We discussed the CATS (Caffeine, Alcohol, Tobacco, Salt/Stress) Avoidance Diet, as well as safety measures to be done at home to avoid injury.  Finally, the variability, potential permanent hearing loss, and natural course of Meniere's disease, including 30% incidence of eventual bilateral involvement, was also discussed.    The use of Dyazide as well as Potassium replacement was also discussed as the next step in treatment.  The patient expressed understanding of today's discussion, and will follow up in 1 month.  I plan on doing audiograms every 6 months for the first 2 years, then yearly assuming they remain stable.    The last item discussed was the possible indications for an MRI scan of the brain and internal auditory canals.  Although the chances of unilateral hearing loss and vertigo eventually being diagnosed as a vestibular schwannoma or other intracranial pathology was quoted as being approximately 1 in 400, consideration of an MRI in the situation of persistent or worsening symptoms is still possible necessary.          Follow-ups after your visit        Follow-up notes from your care team     Return in about 3 weeks (around  "6/23/2017).      Your next 10 appointments already scheduled     Jun 07, 2017 10:00 AM CDT   Evaluation with Fariba Tan, PT   [facility] ([facility] )    [address] [place] [id number]   [phone]              Who to contact     If you have questions or need follow up information about today's clinic visit or your schedule please contact [facility] directly at [phone].  Normal or non-critical lab and imaging results will be communicated to you by IntraStagehart, letter or phone within 4 business days after the clinic has received the results. If you do not hear from us within 7 days, please contact the clinic through [employer] or phone. If you have a critical or abnormal lab result, we will notify you by phone as soon as possible.  Submit refill requests through [employer] or call your pharmacy and they will forward the refill request to us. Please allow 3 business days for your refill to be completed.          Additional Information About Your Visit        [employer] Information     [employer] gives you secure access to your electronic health record. If you see a primary care provider, you can also send messages to your care team and make appointments. If you have questions, please call your primary care clinic.  If you do not have a primary care provider, please call [phone] and they will assist you.        Care EveryWhere ID     This is your Care EveryWhere ID. This could be used by other organizations to access your [place] medical records  [id number]        Your Vitals Were     Pulse Temperature Height Last Period Pulse Oximetry BMI (Body Mass Index)    76 98.1  F (36.7  C) (Tympanic) 5' 1\" (1.549 m) 05/15/2015 97% 28.34 kg/m2       Blood Pressure from Last 3 Encounters:   06/02/17 118/76   05/01/17 128/78   12/16/16 118/80    Weight from Last 3 Encounters:   06/02/17 150 lb (68 kg)   05/01/17 161 lb (73 kg)   12/16/16 149 lb (67.6 kg)              Today, you " had the following     No orders found for display       Primary Care Provider Office Phone # Fax #    Tracey NEWTON MUKESH Leary [phone]       [facility] [address] [place] [id number]        Thank you!     Thank you for choosing [facility]  for your care. Our goal is always to provide you with excellent care. Hearing back from our patients is one way we can continue to improve our services. Please take a few minutes to complete the written survey that you may receive in the mail after your visit with us. Thank you!             Your Updated Medication List - Protect others around you: Learn how to safely use, store and throw away your medicines at www.disposemymeds.org.          This list is accurate as of: 6/2/17  9:16 AM.  Always use your most recent med list.                   Brand Name Dispense Instructions for use    ARMOUR THYROID 30 MG tablet   Generic drug:  thyroid     90 tablet    TAKE 1 TABLET(30 MG) BY MOUTH DAILY       diazepam 2 MG tablet    VALIUM    20 tablet    Take 1 tablet (2 mg) by mouth every 12 hours as needed for anxiety or sleep       loratadine 10 MG tablet    CLARITIN    30 tablet    Take 1 tablet (10 mg) by mouth daily       meclizine 25 MG tablet    ANTIVERT    15 tablet    Take 1 tablet (25 mg) by mouth 3 times daily as needed for dizziness       mometasone 50 MCG/ACT spray    NASONEX    1 Box    Spray 2 sprays into both nostrils daily       neomycin-polymyxin-dexamethasone 3.5-56770-1.1 Oint ophthalmic ointment    MAXITROL    2 Tube    Apply 1/2 ribbon to periocular area 3-4 times a day for one week, then use as needed for severe itching       pimecrolimus 1 % cream    ELIDEL    60 g    Apply topically 2 times daily       sertraline 50 MG tablet    ZOLOFT    90 tablet    Take 1 tablet (50 mg) by mouth daily       SUMAtriptan 100 MG tablet    IMITREX    10 tablet    TAKE ONE TABLET BY MOUTH AS NEEDED       ZOLPIDEM TARTRATE PO      Take  by mouth nightly as needed for sleep

## 2017-06-02 NOTE — PROGRESS NOTES
Audiology Evaluation Completed. Please refer SCANNED AUDIOGRAM and/or TYMPANOGRAM for BACKGROUND, RESULTS, RECOMMENDATIONS.    UNDER RECOMMENDATIONS ON AUDIOGRAM PATIENT REFERRED TO ENT WITH SYMPTOMS      Sharon HUERTA, [facility]  Audiologist #[id number]

## 2017-06-02 NOTE — PATIENT INSTRUCTIONS
WIll contact radiology to review past MRI to rule out inner ear  Start Vestibular therapy.     Start daily Zyrtec or Claritin  Follow CATSS.   If no improvement with above, will start low dose diuretic.   If there are concerns or questions, Call [phone] and ask for nurse      I spent the remainder of today's visit educating the patient about the current theory on the cause of Meniere's Disease and the reasoning behind its treatment.  We discussed the CATS (Caffeine, Alcohol, Tobacco, Salt/Stress) Avoidance Diet, as well as safety measures to be done at home to avoid injury.  Finally, the variability, potential permanent hearing loss, and natural course of Meniere's disease, including 30% incidence of eventual bilateral involvement, was also discussed.    The use of Dyazide as well as Potassium replacement was also discussed as the next step in treatment.  The patient expressed understanding of today's discussion, and will follow up in 1 month.  I plan on doing audiograms every 6 months for the first 2 years, then yearly assuming they remain stable.    The last item discussed was the possible indications for an MRI scan of the brain and internal auditory canals.  Although the chances of unilateral hearing loss and vertigo eventually being diagnosed as a vestibular schwannoma or other intracranial pathology was quoted as being approximately 1 in 400, consideration of an MRI in the situation of persistent or worsening symptoms is still possible necessary.

## 2017-06-07 ENCOUNTER — HOSPITAL ENCOUNTER (OUTPATIENT)
Dept: PHYSICAL THERAPY | Facility: HOSPITAL | Age: 52
Setting detail: THERAPIES SERIES
End: 2017-06-07
Attending: PHYSICIAN ASSISTANT
Payer: COMMERCIAL

## 2017-06-07 PROCEDURE — 40000719 ZZHC STATISTIC PT DEPARTMENT NEURO VISIT

## 2017-06-07 PROCEDURE — 97530 THERAPEUTIC ACTIVITIES: CPT | Mod: GP

## 2017-06-07 PROCEDURE — 97161 PT EVAL LOW COMPLEX 20 MIN: CPT | Mod: GP

## 2017-06-07 PROCEDURE — 97112 NEUROMUSCULAR REEDUCATION: CPT | Mod: GP

## 2017-06-07 NOTE — PROGRESS NOTES
06/07/17 0900   Quick Adds   Quick Adds Vestibular Eval   Type of Visit Initial OP PT Evaluation   General Information   Start of Care Date 06/07/17   Referring Physician Tracey MAYORGA   Orders Evaluate and Treat as Indicated   Order Date 06/07/17   Medical Diagnosis vertigo and tinnitus   Onset of illness/injury or Date of Surgery 05/24/16   Surgical/Medical history reviewed Yes   Pertinent history of current vestibular problem (include personal factors and/or comorbidities that impact the POC)  Depression;Migraines   Pertinent history of current problem (include personal factors and/or comorbidities that impact the POC) Client states she had a vertiginous episode one year ago adn just last week again. Both required meds to level out. She also has a history of insomnia, hypothyroid and tinnitus.   Pertinent Visual History  contacts and cheaters   Prior level of function comment   full time   Patient role/Employment history Employed   [facility] [place]   Patient/Family Goals Statement to get better   Fall Risk Screen   Per patient - Fall 2 or more times in past year? No   Per patient - Fall with injury in past year? No   Is patient a fall risk? No   Pain   Patient currently in pain No   Cognitive Status Examination   Orientation orientation to person, place and time   Level of Consciousness alert   Follows Commands and Answers Questions 100% of the time   Personal Safety and Judgment intact   Memory intact   Observation   Observation no distress   Integumentary   Integumentary No deficits were identified   Posture   Posture Forward head position   Posture Comments cranial flexion of occiput   Range of Motion (ROM)   ROM Quick Adds no deficits were identified   Strength   Manual Muscle Testing Quick Adds No deficits were identified   Strength Comments limbs WFL core 4/5   Bed Mobility   Bed Mobility Comments independent   Transfer Skills   Transfer Comments independent   Gait   Gait  Comments gait no issues other than feeling like loss of balance at times   Balance   Balance Comments balance is functional  but I want to look closer with Mathur for dynamic gait when more time   Sensory Examination   Sensory Perception no deficits were identified   Coordination   Coordination no deficits were identified   Muscle Tone   Muscle Tone no deficits were identified   Oculomotor Exam   Smooth Pursuit Normal   Saccades Normal   VOR Abnormal   VOR Cancellation Abnormal   Rapid Head Thrust Comments right worse than left   Convergence Testing Normal   Infrared Goggle Exam or Frenzel Lense Exam   Vestibular Suppressant in Last 24 Hours? No   Exam completed with Room Light   Spontaneous Nystagmus Negative   Gaze Evoked Nystagmus Negative   Head Shake Horizontal Nystagmus Negative   Positional testing Negative   [place]-Hallpike (right) Negative   [place]-Hallpike (Left) Negative   HSCC Supine Roll Test (Right) Negative   HSCC Supine Roll Test (Left) Negative   Dynamic Visual Acuity (DVA)   Static Acuity (LogMar) line 10   Horizontal Head Movement at 2 Hz (LogMar) line 7   Planned Therapy Interventions   Planned Therapy Interventions balance training;neuromuscular re-education;manual therapy;strengthening   Planned Therapy Interventions Comment ther act   Clinical Impression   Criteria for Skilled Therapeutic Interventions Met yes, treatment indicated   PT Diagnosis decreased vestibular compensation as well as poor posture and balance   Clinical Presentation Stable/Uncomplicated   Clinical Presentation Rationale clinical obs   Clinical Decision Making (Complexity) Low complexity   Therapy Frequency 2 times/Week   Predicted Duration of Therapy Intervention (days/wks) 12 weeks   Risk & Benefits of therapy have been explained Yes   Patient, Family & other staff in agreement with plan of care Yes   Clinical Impression Comments Presents with decreased compensation balance as well as focus vision with head turns. She has  vertigo and postural components that are affecting her as well as possible allergies and food sensitity.   Education Assessment   Preferred Learning Style Listening;Demonstration   Barriers to Learning No barriers   Goal 1   Goal Identifier functional   Goal Description Client will have corrected posture with no somatic dysfunction for better sitting and tolerance for computer work.   Target Date 08/02/17   Goal 2   Goal Identifier functional   Goal Description Client will have minimal to no vertigo with head turns at work or in a community envirornment.   Target Date 08/30/17   Goal 3   Goal Identifier functional   Goal Description Client will have improved DVA 2 a two line differenence for improved vision compensation with head turns for a more stable community function.   Target Date 08/30/17   Goal 4   Goal Identifier long   Goal Description Client will be independent with HEP and knowledge to control vertiginous symptoms.   Target Date 08/30/17   Total Evaluation Time   Total Evaluation Time (Minutes) 25

## 2017-06-13 ENCOUNTER — HOSPITAL ENCOUNTER (OUTPATIENT)
Dept: PHYSICAL THERAPY | Facility: HOSPITAL | Age: 52
Setting detail: THERAPIES SERIES
End: 2017-06-13
Attending: PHYSICIAN ASSISTANT
Payer: COMMERCIAL

## 2017-06-13 PROCEDURE — 97112 NEUROMUSCULAR REEDUCATION: CPT | Mod: GP

## 2017-06-13 PROCEDURE — 97110 THERAPEUTIC EXERCISES: CPT | Mod: GP

## 2017-06-13 PROCEDURE — 40000719 ZZHC STATISTIC PT DEPARTMENT NEURO VISIT

## 2017-06-20 ENCOUNTER — HOSPITAL ENCOUNTER (OUTPATIENT)
Dept: PHYSICAL THERAPY | Facility: HOSPITAL | Age: 52
Setting detail: THERAPIES SERIES
End: 2017-06-20
Attending: PHYSICIAN ASSISTANT
Payer: COMMERCIAL

## 2017-06-20 ENCOUNTER — TELEPHONE (OUTPATIENT)
Dept: FAMILY MEDICINE | Facility: OTHER | Age: 52
End: 2017-06-20

## 2017-06-20 PROCEDURE — 97110 THERAPEUTIC EXERCISES: CPT | Mod: GP

## 2017-06-20 PROCEDURE — 40000719 ZZHC STATISTIC PT DEPARTMENT NEURO VISIT

## 2017-06-20 PROCEDURE — 97112 NEUROMUSCULAR REEDUCATION: CPT | Mod: GP

## 2017-06-20 NOTE — TELEPHONE ENCOUNTER
Mattie Link has seen a connection between sodium and caffeine and has been better at controlling her flare ups. Overall getting better. She was wondering about the diuretic and if that was something to proceed with. Fariba

## 2017-06-20 NOTE — TELEPHONE ENCOUNTER
Some patients are able to manage w. CATSS alone. However,s he should have a f/u upcoming. Depending on symptoms, we may try low dose HCTZ. TR

## 2017-07-20 ENCOUNTER — OFFICE VISIT (OUTPATIENT)
Dept: OTOLARYNGOLOGY | Facility: OTHER | Age: 52
End: 2017-07-20
Attending: PHYSICIAN ASSISTANT
Payer: COMMERCIAL

## 2017-07-20 VITALS
TEMPERATURE: 97.3 F | HEART RATE: 65 BPM | WEIGHT: 149 LBS | DIASTOLIC BLOOD PRESSURE: 70 MMHG | HEIGHT: 61 IN | BODY MASS INDEX: 28.13 KG/M2 | SYSTOLIC BLOOD PRESSURE: 108 MMHG

## 2017-07-20 DIAGNOSIS — H81.09 MENIERE'S DISEASE, UNSPECIFIED LATERALITY: ICD-10-CM

## 2017-07-20 DIAGNOSIS — H93.13 TINNITUS, BILATERAL: ICD-10-CM

## 2017-07-20 DIAGNOSIS — R42 VERTIGO: ICD-10-CM

## 2017-07-20 DIAGNOSIS — H81.09 MENIERE'S DISEASE, UNSPECIFIED LATERALITY: Primary | ICD-10-CM

## 2017-07-20 DIAGNOSIS — R42 DIZZINESS: ICD-10-CM

## 2017-07-20 PROCEDURE — 99214 OFFICE O/P EST MOD 30 MIN: CPT | Mod: 25 | Performed by: PHYSICIAN ASSISTANT

## 2017-07-20 PROCEDURE — 92504 EAR MICROSCOPY EXAMINATION: CPT | Performed by: PHYSICIAN ASSISTANT

## 2017-07-20 RX ORDER — HYDROCHLOROTHIAZIDE 12.5 MG/1
12.5 TABLET ORAL DAILY
Qty: 30 TABLET | Refills: 11 | Status: SHIPPED | OUTPATIENT
Start: 2017-07-20 | End: 2017-07-20

## 2017-07-20 RX ORDER — MECLIZINE HYDROCHLORIDE 25 MG/1
25 TABLET ORAL 3 TIMES DAILY PRN
Qty: 30 TABLET | Refills: 0 | Status: SHIPPED | OUTPATIENT
Start: 2017-07-20 | End: 2023-02-16

## 2017-07-20 ASSESSMENT — PAIN SCALES - GENERAL: PAINLEVEL: NO PAIN (0)

## 2017-07-20 NOTE — TELEPHONE ENCOUNTER
HCTZ       Last Written Prescription Date: 7/20/2017  Last Fill Quantity: 90, # refills: 11  Last Office Visit with G, P or [facility] prescribing provider: 5/01/2017       Potassium   Date Value Ref Range Status   05/01/2017 3.9 3.4 - 5.3 mmol/L Final     Creatinine   Date Value Ref Range Status   05/01/2017 0.75 0.52 - 1.04 mg/dL Final     BP Readings from Last 3 Encounters:   07/20/17 108/70   06/02/17 118/76   05/01/17 128/78

## 2017-07-20 NOTE — NURSING NOTE
"Chief Complaint   Patient presents with     Dizziness     Pt is here for a f/u dizziness/vertigo.  Pt was ordered vestibular rehab.  Pt states that it has improved.  Has not had any episodes since last here.       Initial /70 (BP Location: Right arm, Cuff Size: Adult Regular)  Pulse 65  Temp 97.3  F (36.3  C) (Tympanic)  Ht 5' 1\" (1.549 m)  Wt 149 lb (67.6 kg)  LMP 05/15/2015  BMI 28.15 kg/m2 Estimated body mass index is 28.15 kg/(m^2) as calculated from the following:    Height as of this encounter: 5' 1\" (1.549 m).    Weight as of this encounter: 149 lb (67.6 kg).  Medication Reconciliation: complete   Dawn Fay LPN      "

## 2017-07-20 NOTE — PROGRESS NOTES
"  Chief Complaint   Patient presents with     Dizziness     Pt is here for a f/u dizziness/vertigo.  Pt was ordered vestibular rehab.  Pt states that it has improved.  Has not had any episodes since last here.     She has been doing well. '  Less symptoms.   Denies concerns with recent attacks.   No fluctuating hearing loss. Tinnitus remains, ears occassionally feel plugged. Improvement with NA and caffeine reduction.   Completed PT w/ good results.   Past Medical History:   Diagnosis Date     Major depressive disorder, single episode, mild (H) 9/19/2011     Thyroid disease       No Known Allergies  Current Outpatient Prescriptions   Medication     ARMOUR THYROID 30 MG tablet     SUMAtriptan (IMITREX) 100 MG tablet     neomycin-polymyxin-dexamethasone (MAXITROL) 3.5-58830-4.1 OINT ophthalmic ointment     sertraline (ZOLOFT) 50 MG tablet     meclizine (ANTIVERT) 25 MG tablet     mometasone (NASONEX) 50 MCG/ACT nasal spray     ZOLPIDEM TARTRATE PO     loratadine (CLARITIN) 10 MG tablet     pimecrolimus (ELIDEL) 1 % cream     No current facility-administered medications for this visit.       ROS: 10 point ROS neg other than the symptoms noted above in the HPI.  /70 (BP Location: Right arm, Cuff Size: Adult Regular)  Pulse 65  Temp 97.3  F (36.3  C) (Tympanic)  Ht 5' 1\" (1.549 m)  Wt 149 lb (67.6 kg)  LMP 05/15/2015  BMI 28.15 kg/m2    General - The patient is well nourished and well developed, and appears to have good nutritional status. Alert and oriented to person and place, answers questions and cooperates with examination appropriately.   Head and Face - Normocephalic and atraumatic, with no gross asymmetry noted. The facial nerve is intact, with strong symmetric movements.  Voice and Breathing - The patient was breathing comfortably without the use of accessory muscles. There was no wheezing, stridor, or stertor. The patients voice was clear and strong, and had appropriate pitch and quality.  Ears -The " external auditory canals are patent, the tympanic membranes are intact without effusion, retraction or mass. Bony landmarks are intact. Ears examined under microscopy.   Eyes - Extraocular movements intact, and the pupils were reactive to light. Sclera were not icteric or injected, conjunctiva were pink and moist.  Mouth - Examination of the oral cavity showed pink, healthy oral mucosa. No lesions or ulcerations noted. The tongue was mobile and midline, and the dentition were in good condition.   Throat - The walls of the oropharynx were smooth, pink, moist, symmetric, and had no lesions or ulcerations. The tonsillar pillars and soft palate were symmetric. The uvula was midline on elevation.   Neck - Normal midline excursion of the laryngotracheal complex during swallowing. Full range of motion on passive movement. Palpation of the occipital, submental, submandibular, internal jugular chain, and supraclavicular nodes did not demonstrate any abnormal lymph nodes or masses. Palpation of the thyroid was irregular without palpable dominant nodule or goiter. The trachea was mobile and midline.  Nose - External contour is symmetric, no gross deflection or scars.   ASSESSMENT:    ICD-10-CM    1. Meniere's disease, unspecified laterality H81.09 meclizine (ANTIVERT) 25 MG tablet     hydrochlorothiazide 12.5 MG TABS tablet     Potassium   2. Vertigo R42 meclizine (ANTIVERT) 25 MG tablet     Potassium   3. Tinnitus, bilateral H93.13    4. Dizziness R42        Discussed with Mattie, she has been doing well since working on Na precautions, caffeine avoidance. Overall she has less symptoms. No vertigo attacks  Complete PT with good results  She wishes to try HCTZ  We discussed options and this time, will complete a trial period. Recheck K in 2 weeks.   She will continue w// precautions of CATSS. No fluctuating/ ASNHL on audiogram. However, her symptoms are felt to be greater related to Meniere's.     Meniere's disease is almost  always idiopathic or of no specific cause.  There is no cure at this time. A plan can be put forward to ease the patient's symptoms.  Limiting salt intake is essential in a person's diet.   Tests such as an ENG  and a radiographic work-up to include an MRI can be helpful.  Diuretics help reduce fluid buildup in a patient's inner ear. Antivert will reduce symptoms as well.  If symptoms are recalcitrant to this type of management surgical options exist as well.    Reduction of stress along with avoidance of caffeine, alcohol and tobacco will often help manage symptoms of Meniere's disease.  A pamphlet on Meniere's disease was given to and reviewed with the patient.          Fariba Lara PA-C  ENT  [facility], [place]  [phone]

## 2017-07-20 NOTE — MR AVS SNAPSHOT
After Visit Summary   7/20/2017    Mattie Edwards    MRN: [id number]           Patient Information     Date Of Birth          1965        Visit Information        Provider Department      7/20/2017 11:15 AM Fariba Lara PA-C [facility]        Today's Diagnoses     Meniere's disease, unspecified laterality    -  1    Vertigo          Care Instructions    Start HCTZ 12.5 mg one tablet daily  Monitor sodium intake/ caffeine.   Recheck potassium in 2 weeks.       I spent the remainder of today's visit educating the patient about the current theory on the cause of Meniere's Disease and the reasoning behind its treatment.  We discussed the CATS (Caffeine, Alcohol, Tobacco, Salt/Stress) Avoidance Diet, as well as safety measures to be done at home to avoid injury.  Finally, the variability, potential permanent hearing loss, and natural course of Meniere's disease, including 30% incidence of eventual bilateral involvement, was also discussed.    The use of Dyazide/ HCTZ as well as Potassium replacement was also discussed as the next step in treatment.  The patient expressed understanding of today's discussion, and will follow up in 1 month.  I plan on doing audiograms every 6 months for the first 2 years, then yearly assuming they remain stable.    If there are concerns or questions, Call [phone] and ask for nurse            Follow-ups after your visit        Follow-up notes from your care team     Return in about 3 months (around 10/20/2017).      Who to contact     If you have questions or need follow up information about today's clinic visit or your schedule please contact [facility] directly at [phone].  Normal or non-critical lab and imaging results will be communicated to you by MyChart, letter or phone within 4 business days after the clinic has received the results. If you do not hear from us within 7 days, please contact the clinic through MyChart or phone. If  "you have a critical or abnormal lab result, we will notify you by phone as soon as possible.  Submit refill requests through [employer] or call your pharmacy and they will forward the refill request to us. Please allow 3 business days for your refill to be completed.          Additional Information About Your Visit        [employer] Information     [employer] gives you secure access to your electronic health record. If you see a primary care provider, you can also send messages to your care team and make appointments. If you have questions, please call your primary care clinic.  If you do not have a primary care provider, please call [phone] and they will assist you.        Care EveryWhere ID     This is your Care EveryWhere ID. This could be used by other organizations to access your [facility] medical records  [id number]        Your Vitals Were     Pulse Temperature Height Last Period BMI (Body Mass Index)       65 97.3  F (36.3  C) (Tympanic) 5' 1\" (1.549 m) 05/15/2015 28.15 kg/m2        Blood Pressure from Last 3 Encounters:   07/20/17 108/70   06/02/17 118/76   05/01/17 128/78    Weight from Last 3 Encounters:   07/20/17 149 lb (67.6 kg)   06/02/17 150 lb (68 kg)   05/01/17 161 lb (73 kg)              Today, you had the following     No orders found for display         Today's Medication Changes          These changes are accurate as of: 7/20/17 11:26 AM.  If you have any questions, ask your nurse or doctor.               Start taking these medicines.        Dose/Directions    hydrochlorothiazide 12.5 MG Tabs tablet   Used for:  Meniere's disease, unspecified laterality   Started by:  Fariba Lara PA-C        Dose:  12.5 mg   Take 1 tablet (12.5 mg) by mouth daily   Quantity:  30 tablet   Refills:  11            Where to get your medicines      These medications were sent to [id number] - [place]  [address] AT [facility] [address] [address]     Phone:  " [phone]     hydrochlorothiazide 12.5 MG Tabs tablet    meclizine 25 MG tablet                Primary Care Provider Office Phone # Fax #    Tracey NEWTON MUKESH Leary [phone]       [facility] [address] [facility] [address]  [facility] [address]        Equal Access to Services     NATASHA PAUL : Hadii aad ku hadasho Soomaali, waaxda luqadaha, qaybta kaalmada adeegyada, waxay idiin hayaan adeeg seraeva lakaitlynn . So [facility] [phone].    ATENCIÓN: Si habla español, tiene a potter disposición servicios gratuitos de asistencia lingüística. Robert al [phone].    We comply with applicable federal civil rights laws and [place] laws. We do not discriminate on the basis of race, color, national origin, age, disability sex, sexual orientation or gender identity.            Thank you!     Thank you for choosing [facility]  for your care. Our goal is always to provide you with excellent care. Hearing back from our patients is one way we can continue to improve our services. Please take a few minutes to complete the written survey that you may receive in the mail after your visit with us. Thank you!             Your Updated Medication List - Protect others around you: Learn how to safely use, store and throw away your medicines at www.disposemymeds.org.          This list is accurate as of: 7/20/17 11:26 AM.  Always use your most recent med list.                   Brand Name Dispense Instructions for use Diagnosis    ARMOUR THYROID 30 MG tablet   Generic drug:  thyroid     90 tablet    TAKE 1 TABLET(30 MG) BY MOUTH DAILY    Hypothyroidism       hydrochlorothiazide 12.5 MG Tabs tablet     30 tablet    Take 1 tablet (12.5 mg) by mouth daily    Meniere's disease, unspecified laterality       loratadine 10 MG tablet    CLARITIN    30 tablet    Take 1 tablet (10 mg) by mouth daily    Allergic reaction, initial encounter       meclizine 25 MG tablet    ANTIVERT    30 tablet    Take 1 tablet (25 mg) by  mouth 3 times daily as needed for dizziness    Meniere's disease, unspecified laterality, Vertigo       mometasone 50 MCG/ACT spray    NASONEX    1 Box    Spray 2 sprays into both nostrils daily    Recurrent sinus infections       neomycin-polymyxin-dexamethasone 3.5-61982-3.1 Oint ophthalmic ointment    MAXITROL    2 Tube    Apply 1/2 ribbon to periocular area 3-4 times a day for one week, then use as needed for severe itching    Periocular dermatitis       pimecrolimus 1 % cream    ELIDEL    60 g    Apply topically 2 times daily    Allergic reaction, initial encounter       sertraline 50 MG tablet    ZOLOFT    90 tablet    Take 1 tablet (50 mg) by mouth daily    Major depressive disorder, single episode, mild (H)       SUMAtriptan 100 MG tablet    IMITREX    10 tablet    TAKE ONE TABLET BY MOUTH AS NEEDED    Intractable episodic paroxysmal hemicrania       ZOLPIDEM TARTRATE PO      Take by mouth nightly as needed for sleep

## 2017-07-20 NOTE — PATIENT INSTRUCTIONS
Start HCTZ 12.5 mg one tablet daily  Monitor sodium intake/ caffeine.   Recheck potassium in 2 weeks.       I spent the remainder of today's visit educating the patient about the current theory on the cause of Meniere's Disease and the reasoning behind its treatment.  We discussed the CATS (Caffeine, Alcohol, Tobacco, Salt/Stress) Avoidance Diet, as well as safety measures to be done at home to avoid injury.  Finally, the variability, potential permanent hearing loss, and natural course of Meniere's disease, including 30% incidence of eventual bilateral involvement, was also discussed.    The use of Dyazide/ HCTZ as well as Potassium replacement was also discussed as the next step in treatment.  The patient expressed understanding of today's discussion, and will follow up in 1 month.  I plan on doing audiograms every 6 months for the first 2 years, then yearly assuming they remain stable.    If there are concerns or questions, Call [phone] and ask for nurse

## 2017-07-24 RX ORDER — HYDROCHLOROTHIAZIDE 12.5 MG/1
TABLET ORAL
Qty: 90 TABLET | Refills: 2 | Status: SHIPPED | OUTPATIENT
Start: 2017-07-24 | End: 2018-07-13

## 2017-08-08 DIAGNOSIS — R42 VERTIGO: ICD-10-CM

## 2017-08-08 DIAGNOSIS — H81.09 MENIERE'S DISEASE, UNSPECIFIED LATERALITY: ICD-10-CM

## 2017-08-08 LAB — POTASSIUM SERPL-SCNC: 3.9 MMOL/L (ref 3.4–5.3)

## 2017-08-08 PROCEDURE — 84132 ASSAY OF SERUM POTASSIUM: CPT | Performed by: PHYSICIAN ASSISTANT

## 2017-08-08 PROCEDURE — 36415 COLL VENOUS BLD VENIPUNCTURE: CPT | Performed by: PHYSICIAN ASSISTANT

## 2017-08-19 NOTE — PROGRESS NOTES
Outpatient Physical Therapy Discharge Note     Patient: Mattie Edwards  : 1965    Beginning/End Dates of Reporting Period:  2017to 2017    Referring Provider: Fariba Christy Diagnosis: decreased balance compensation and vertigo  Client Self Report: states she is much better as of her last appointment with me . SHe did see Fariba Lara after that and expressed was doing well .    Objective Measurements:                                      I with HEP I had given her thus far but we did not complete into Gaze 2 exercises but she expressed a month later that she was doing well when she saw ENT.       Outcome Measures (most recent score):      Goals:  Goal Identifier functional   Goal Description Client will have corrected posture with no somatic dysfunction for better sitting and tolerance for computer work.   Target Date 17   Date Met      Progress:     Goal Identifier functional   Goal Description Client will have minimal to no vertigo with head turns at work or in a community envirornment.   Target Date 17   Date Met      Progress:     Goal Identifier functional   Goal Description Client will have improved DVA 2 a two line differenence for improved vision compensation with head turns for a more stable community function.   Target Date 17   Date Met      Progress:     Goal Identifier long   Goal Description Client will be independent with HEP and knowledge to control vertiginous symptoms.   Target Date 17   Date Met      Progress:     Goal Identifier     Goal Description     Target Date     Date Met      Progress:     Goal Identifier     Goal Description     Target Date     Date Met      Progress:     Goal Identifier     Goal Description     Target Date     Date Met      Progress:     Goal Identifier     Goal Description     Target Date     Date Met      Progress:     Progress Toward Goals:   Progress limited due to only a few visits but did  well.    Plan:  Discharge from therapy.    Discharge:    Reason for Discharge: Patient has failed to schedule further appointments.    Equipment Issued:     Discharge Plan: Patient to continue home program.

## 2017-10-08 ENCOUNTER — HOSPITAL ENCOUNTER (EMERGENCY)
Facility: HOSPITAL | Age: 52
Discharge: HOME | End: 2017-10-08
Attending: NURSE PRACTITIONER | Admitting: NURSE PRACTITIONER
Payer: COMMERCIAL

## 2017-10-08 VITALS
TEMPERATURE: 98.8 F | DIASTOLIC BLOOD PRESSURE: 61 MMHG | SYSTOLIC BLOOD PRESSURE: 132 MMHG | HEART RATE: 88 BPM | RESPIRATION RATE: 20 BRPM | OXYGEN SATURATION: 98 %

## 2017-10-08 DIAGNOSIS — L92.9 PROUD FLESH: ICD-10-CM

## 2017-10-08 DIAGNOSIS — L03.032 PARONYCHIA OF GREAT TOE, LEFT: ICD-10-CM

## 2017-10-08 PROCEDURE — 25000132 ZZH RX MED GY IP 250 OP 250 PS 637: Performed by: NURSE PRACTITIONER

## 2017-10-08 PROCEDURE — 99213 OFFICE O/P EST LOW 20 MIN: CPT

## 2017-10-08 PROCEDURE — 99213 OFFICE O/P EST LOW 20 MIN: CPT | Performed by: NURSE PRACTITIONER

## 2017-10-08 RX ORDER — CEPHALEXIN 500 MG/1
500 CAPSULE ORAL 2 TIMES DAILY
Qty: 13 CAPSULE | Refills: 0 | Status: SHIPPED | OUTPATIENT
Start: 2017-10-08 | End: 2017-10-15

## 2017-10-08 RX ORDER — CEPHALEXIN 500 MG/1
500 CAPSULE ORAL ONCE
Status: COMPLETED | OUTPATIENT
Start: 2017-10-08 | End: 2017-10-08

## 2017-10-08 RX ADMIN — CEPHALEXIN 500 MG: 500 CAPSULE ORAL at 18:24

## 2017-10-08 ASSESSMENT — ENCOUNTER SYMPTOMS
MUSCULOSKELETAL NEGATIVE: 1
CONSTITUTIONAL NEGATIVE: 1
WOUND: 1

## 2017-10-08 NOTE — ED PROVIDER NOTES
History     Chief Complaint   Patient presents with     Toe Injury     The history is provided by the patient and the spouse. No  was used.     Mattie Edwards is a 52 year old female who presents with pain and redness in her left great toe. Injured the toenail 1 week ago where part of the toenail was traumatically avulsed and didn't do any wound care. Has subsequently developed pain and swelling since. No OTC medications taken or applied. Reports she didn't have time to soak it as suggested by friends and family, didn't have any antibiotic ointment at home to apply to the wound.     I have reviewed the Medications, Allergies, Past Medical and Surgical History, and Social History in the Epic system.    Allergies: No Known Allergies      No current facility-administered medications on file prior to encounter.   Current Outpatient Prescriptions on File Prior to Encounter:  hydrochlorothiazide 12.5 MG TABS tablet TAKE 1 TABLET(12.5 MG) BY MOUTH DAILY   meclizine (ANTIVERT) 25 MG tablet Take 1 tablet (25 mg) by mouth 3 times daily as needed for dizziness   ARMOUR THYROID 30 MG tablet TAKE 1 TABLET(30 MG) BY MOUTH DAILY   SUMAtriptan (IMITREX) 100 MG tablet TAKE ONE TABLET BY MOUTH AS NEEDED   neomycin-polymyxin-dexamethasone (MAXITROL) 3.5-61594-1.1 OINT ophthalmic ointment Apply 1/2 ribbon to periocular area 3-4 times a day for one week, then use as needed for severe itching   sertraline (ZOLOFT) 50 MG tablet Take 1 tablet (50 mg) by mouth daily   mometasone (NASONEX) 50 MCG/ACT nasal spray Spray 2 sprays into both nostrils daily   ZOLPIDEM TARTRATE PO Take by mouth nightly as needed for sleep   loratadine (CLARITIN) 10 MG tablet Take 1 tablet (10 mg) by mouth daily (Patient taking differently: Take 10 mg by mouth as needed )   pimecrolimus (ELIDEL) 1 % cream Apply topically 2 times daily       Patient Active Problem List   Diagnosis     Major depressive disorder, single episode, mild (H)      "Left Thyroid nodules     hypothyroidism     Perennial allergic rhinitis     Chronic maxillary sinusitis     Chronic ethmoidal sinusitis     Nasal turbinate hypertrophy     ACP (advance care planning)       Past Surgical History:   Procedure Laterality Date     NO HISTORY OF SURGERY         Social History   Substance Use Topics     Smoking status: Never Smoker     Smokeless tobacco: Never Used      Comment: no passive exposure     Alcohol use No       Most Recent Immunizations   Administered Date(s) Administered     Influenza (IIV3) 10/20/2016     TDAP Vaccine (Boostrix) 07/23/2009       BMI: Estimated body mass index is 28.15 kg/(m^2) as calculated from the following:    Height as of 7/20/17: 1.549 m (5' 1\").    Weight as of 10/11/17: 67.6 kg (149 lb).      Review of Systems   Constitutional: Negative.    Musculoskeletal: Negative.    Skin: Positive for wound.       Physical Exam   BP: 132/61  Pulse: 88  Temp: 98.8  F (37.1  C)  Resp: 20  SpO2: 98 %  Physical Exam   Constitutional: She is oriented to person, place, and time. She appears well-developed and well-nourished. No distress.   Pulmonary/Chest: Effort normal.   Musculoskeletal: Normal range of motion.        Right foot: Normal.        Feet:    No abscess evident in left great toe.  Good movement in left foot and ankle.    Neurological: She is alert and oriented to person, place, and time.   Skin: She is not diaphoretic.   Nursing note and vitals reviewed.      ED Course     ED Course     Procedures          Attempted to have pt soak foot in office, too painful for patient.   Cleansed area with q-tip gently, covered with abx ointment and a bandage.     Medications   cephALEXin (KEFLEX) capsule 500 mg (500 mg Oral Given 10/8/17 1824)     Assessments & Plan (with Medical Decision Making)     I have reviewed the nursing notes.  I have reviewed the findings, diagnosis, plan and need for follow up with the patient.  Given written educational materials.   Discussed " wound care.   Soaks 3-4 times daily.   Cover with abx ointment and bandage.   Take medications as ordered.   F/u with PCP for re-evaluation in 2-3 days.   Will likely need wound pared down with silver nitrate once there is some improvement.      Discharge Medication List as of 10/8/2017  6:20 PM      START taking these medications    Details   cephALEXin (KEFLEX) 500 MG capsule Take 1 capsule (500 mg) by mouth 2 times daily for 7 days, Disp-13 capsule, R-0, E-PrescribeFirst dose given in UC           Final diagnoses:   Proud flesh   Paronychia of great toe, left       10/8/2017   [facility]     Iris Shaffer NP  10/08/17 1948    Corrected anatomical site.   Iris Shaffer CNP  12:51 PM  October 16, 2017       Iris Shaffer NP  10/16/17 1252

## 2017-10-08 NOTE — DISCHARGE INSTRUCTIONS
Paronychia of the Finger or Toe  Paronychia is an infection in toenail area. It usually occurs when an opening in the cuticle or an ingrown toenail lets bacteria under the skin.  The infection will need to be drained if pus is present. If the infection has been caught early, you may need only antibiotic treatment. Healing will take about 1 to 2 weeks.  Home care  Follow these guidelines when caring for yourself at home:    Clean and soak the toe or finger. Do this 2 times a day for the first 3 days. To do so:    Soak your foot or hand in a tub of warm water for 5 minutes. Or hold your toe or finger under a faucet of warm running water for 5 minutes.    Clean any crust away with soap and water using a cotton swab.    Put antibiotic ointment on the infected area.    Change the dressing daily or any time it gets dirty.    If you were given antibiotics, take them as directed until they are all gone.    If your infection is on a toe, wear comfortable shoes with a lot of toe room. You can also wear open-toed sandals while your toe heals.    You may use over-the-counter medicine (acetaminophen or ibuprofen to help with pain, unless another medicine was prescribed. If you have chronic liver or kidney disease, talk with your healthcare provider before using these medicines. Also talk with your provider if you've had a stomach ulcer or GI (gastrointestinal) bleeding.  Prevention  The following can prevent paronychia:    Avoid cutting or playing with your cuticles at home.    Don't bite your nails.    Don't suck on your thumbs or fingers.  Follow-up care  Follow up with your healthcare provider, or as advised.  When to seek medical advice  Call your healthcare provider right away if any of these occur:    Redness, pain, or swelling of the finger or toe gets worse    Red streaks in the skin leading away from the wound    Pus or fluid draining from the nail area    Fever of 100.4 F (38 C) or higher, or as directed by your  provider  Date Last Reviewed: 8/1/2016 2000-2017 [employer]. [address] [place] [address], [place] [address]. All rights reserved. This information is not intended as a substitute for professional medical care. Always follow your healthcare professional's instructions.

## 2017-10-08 NOTE — ED AVS SNAPSHOT
[facility]    [address] [place] [address]    [place] [address]    Phone:  [phone]                                       Mattie dEwards   MRN: [id number]    Department:  [facility]   Date of Visit:  10/8/2017           Patient Information     Date Of Birth          1965        Your diagnoses for this visit were:     Paronychia of great toe, right     Proud flesh        You were seen by Iris Shaffer NP.      Follow-up Information     Follow up with Tracey Leary PA. Schedule an appointment as soon as possible for a visit in 3 days.    Specialty:  Family Practice    Why:  for re-evaluation    Contact information:    [facility]  [address] [facility] [address]  [place] [address]  [phone]          Discharge Instructions         Paronychia of the Finger or Toe  Paronychia is an infection in toenail area. It usually occurs when an opening in the cuticle or an ingrown toenail lets bacteria under the skin.  The infection will need to be drained if pus is present. If the infection has been caught early, you may need only antibiotic treatment. Healing will take about 1 to 2 weeks.  Home care  Follow these guidelines when caring for yourself at home:    Clean and soak the toe or finger. Do this 2 times a day for the first 3 days. To do so:    Soak your foot or hand in a tub of warm water for 5 minutes. Or hold your toe or finger under a faucet of warm running water for 5 minutes.    Clean any crust away with soap and water using a cotton swab.    Put antibiotic ointment on the infected area.    Change the dressing daily or any time it gets dirty.    If you were given antibiotics, take them as directed until they are all gone.    If your infection is on a toe, wear comfortable shoes with a lot of toe room. You can also wear open-toed sandals while your toe heals.    You may use over-the-counter medicine (acetaminophen or ibuprofen to help with pain, unless another  medicine was prescribed. If you have chronic liver or kidney disease, talk with your healthcare provider before using these medicines. Also talk with your provider if you've had a stomach ulcer or GI (gastrointestinal) bleeding.  Prevention  The following can prevent paronychia:    Avoid cutting or playing with your cuticles at home.    Don't bite your nails.    Don't suck on your thumbs or fingers.  Follow-up care  Follow up with your healthcare provider, or as advised.  When to seek medical advice  Call your healthcare provider right away if any of these occur:    Redness, pain, or swelling of the finger or toe gets worse    Red streaks in the skin leading away from the wound    Pus or fluid draining from the nail area    Fever of 100.4 F (38 C) or higher, or as directed by your provider  Date Last Reviewed: 8/1/2016 2000-2017 [employer]. [address] [place] [address]. All rights reserved. This information is not intended as a substitute for professional medical care. Always follow your healthcare professional's instructions.             Review of your medicines      START taking        Dose / Directions Last dose taken    cephALEXin 500 MG capsule   Commonly known as:  KEFLEX   Dose:  500 mg   Quantity:  13 capsule        Take 1 capsule (500 mg) by mouth 2 times daily for 7 days   Refills:  0          Our records show that you are taking the medicines listed below. If these are incorrect, please call your family doctor or clinic.        Dose / Directions Last dose taken    RUDY THYROID 30 MG tablet   Quantity:  90 tablet   Generic drug:  thyroid        TAKE 1 TABLET(30 MG) BY MOUTH DAILY   Refills:  3        hydrochlorothiazide 12.5 MG Tabs tablet   Quantity:  90 tablet        TAKE 1 TABLET(12.5 MG) BY MOUTH DAILY   Refills:  2        loratadine 10 MG tablet   Commonly known as:  CLARITIN   Dose:  10 mg   Quantity:  30 tablet        Take 1 tablet (10 mg) by mouth daily   Refills:   1        meclizine 25 MG tablet   Commonly known as:  ANTIVERT   Dose:  25 mg   Quantity:  30 tablet        Take 1 tablet (25 mg) by mouth 3 times daily as needed for dizziness   Refills:  0        mometasone 50 MCG/ACT spray   Commonly known as:  NASONEX   Dose:  2 spray   Quantity:  1 Box        Spray 2 sprays into both nostrils daily   Refills:  11        neomycin-polymyxin-dexamethasone 3.5-66951-9.1 Oint ophthalmic ointment   Commonly known as:  MAXITROL   Quantity:  2 Tube        Apply 1/2 ribbon to periocular area 3-4 times a day for one week, then use as needed for severe itching   Refills:  1        pimecrolimus 1 % cream   Commonly known as:  ELIDEL   Quantity:  60 g        Apply topically 2 times daily   Refills:  0        sertraline 50 MG tablet   Commonly known as:  ZOLOFT   Dose:  50 mg   Quantity:  90 tablet        Take 1 tablet (50 mg) by mouth daily   Refills:  3        SUMAtriptan 100 MG tablet   Commonly known as:  IMITREX   Quantity:  10 tablet        TAKE ONE TABLET BY MOUTH AS NEEDED   Refills:  1        ZOLPIDEM TARTRATE PO        Take by mouth nightly as needed for sleep   Refills:  0                Prescriptions were sent or printed at these locations (1 Prescription)                   [facility] [address] [place], [place] - [address] [facility] [address]   [address] [address]    Telephone:  [phone]   Fax:  [phone]   Hours:                  E-Prescribed (1 of 1)         cephALEXin (KEFLEX) 500 MG capsule                Orders Needing Specimen Collection     None      Pending Results     No orders found from 10/6/2017 to 10/9/2017.            Pending Culture Results     No orders found from 10/6/2017 to 10/9/2017.            Thank you for choosing [facility]       Thank you for choosing [facility] for your care. Our goal is always to provide you with excellent care. Hearing back from our patients is one way we can continue to improve our services. Please  take a few minutes to complete the written survey that you may receive in the mail after you visit with us. Thank you!        [employer] Information     [employer] gives you secure access to your electronic health record. If you see a primary care provider, you can also send messages to your care team and make appointments. If you have questions, please call your primary care clinic.  If you do not have a primary care provider, please call [phone] and they will assist you.        Care EveryWhere ID     This is your Care EveryWhere ID. This could be used by other organizations to access your [facility] medical records  [id number]        Equal Access to Services     [facility] : Jovita Siddiqi, fiorella jane, oral lacey, sheela emmanuel. So [facility] [phone].    ATENCIÓN: Si habla español, tiene a potter disposición servicios gratuitos de asistencia lingüística. Robertame al [phone].    We comply with applicable federal civil rights laws and [place] laws. We do not discriminate on the basis of race, color, national origin, age, disability, sex, sexual orientation, or gender identity.            After Visit Summary       This is your record. Keep this with you and show to your community pharmacist(s) and doctor(s) at your next visit.

## 2017-10-08 NOTE — ED AVS SNAPSHOT
[facility]    [address]    [place] [address]    Phone:  [phone]                                       Mattie Edwards   MRN: [id number]    Department[facility]  [facility]   Date of Visit:  10/8/2017           After Visit Summary Signature Page     I have received my discharge instructions, and my questions have been answered. I have discussed any challenges I see with this plan with the nurse or doctor.    ..........................................................................................................................................  Patient/Patient Representative Signature      ..........................................................................................................................................  Patient Representative Print Name and Relationship to Patient    ..................................................               ................................................  Date                                            Time    ..........................................................................................................................................  Reviewed by Signature/Title    ...................................................              ..............................................  Date                                                            Time

## 2017-10-11 ENCOUNTER — OFFICE VISIT (OUTPATIENT)
Dept: FAMILY MEDICINE | Facility: OTHER | Age: 52
End: 2017-10-11
Attending: PHYSICIAN ASSISTANT
Payer: COMMERCIAL

## 2017-10-11 VITALS
DIASTOLIC BLOOD PRESSURE: 64 MMHG | HEART RATE: 75 BPM | OXYGEN SATURATION: 96 % | TEMPERATURE: 97.3 F | BODY MASS INDEX: 28.15 KG/M2 | SYSTOLIC BLOOD PRESSURE: 126 MMHG | WEIGHT: 149 LBS

## 2017-10-11 DIAGNOSIS — S91.209D AVULSION OF TOENAIL, SUBSEQUENT ENCOUNTER: Primary | ICD-10-CM

## 2017-10-11 PROCEDURE — 99213 OFFICE O/P EST LOW 20 MIN: CPT | Performed by: PHYSICIAN ASSISTANT

## 2017-10-11 ASSESSMENT — ANXIETY QUESTIONNAIRES
1. FEELING NERVOUS, ANXIOUS, OR ON EDGE: NOT AT ALL
3. WORRYING TOO MUCH ABOUT DIFFERENT THINGS: NOT AT ALL
7. FEELING AFRAID AS IF SOMETHING AWFUL MIGHT HAPPEN: NOT AT ALL
GAD7 TOTAL SCORE: 0
5. BEING SO RESTLESS THAT IT IS HARD TO SIT STILL: NOT AT ALL
6. BECOMING EASILY ANNOYED OR IRRITABLE: NOT AT ALL
4. TROUBLE RELAXING: NOT AT ALL
2. NOT BEING ABLE TO STOP OR CONTROL WORRYING: NOT AT ALL

## 2017-10-11 ASSESSMENT — PATIENT HEALTH QUESTIONNAIRE - PHQ9: SUM OF ALL RESPONSES TO PHQ QUESTIONS 1-9: 3

## 2017-10-11 ASSESSMENT — PAIN SCALES - GENERAL: PAINLEVEL: MILD PAIN (3)

## 2017-10-11 NOTE — NURSING NOTE
"Chief Complaint   Patient presents with     ER F/U     From [facility]        Initial /64 (BP Location: Left arm, Patient Position: Chair, Cuff Size: Adult Large)  Pulse 75  Temp 97.3  F (36.3  C) (Tympanic)  Wt 149 lb (67.6 kg)  LMP 05/15/2015  SpO2 96%  Breastfeeding? No  BMI 28.15 kg/m2 Estimated body mass index is 28.15 kg/(m^2) as calculated from the following:    Height as of 7/20/17: 5' 1\" (1.549 m).    Weight as of this encounter: 149 lb (67.6 kg).  Medication Reconciliation: complete   Saba Valdez CMA(AAMA)   "

## 2017-10-11 NOTE — PROGRESS NOTES
SUBJECTIVE:   Mattie Edwards is a 52 year old female who presents to clinic today for the following health issues:        ED/UC Followup:    [facility]   Date of visit: 10/8/17  Reason for visit: infection of big toe on left foot   Current Status: patient states improving today pain score 3/10             Problem list and histories reviewed & adjusted, as indicated.  Additional history: as documented    Patient Active Problem List   Diagnosis     Major depressive disorder, single episode, mild (H)     Left Thyroid nodules     hypothyroidism     Perennial allergic rhinitis     Chronic maxillary sinusitis     Chronic ethmoidal sinusitis     Nasal turbinate hypertrophy     ACP (advance care planning)     Past Surgical History:   Procedure Laterality Date     NO HISTORY OF SURGERY         Social History   Substance Use Topics     Smoking status: Never Smoker     Smokeless tobacco: Never Used      Comment: no passive exposure     Alcohol use No     Family History   Problem Relation Age of Onset     CANCER Father      brain; cause of death     Myocardial Infarction Paternal Grandfather      Myocardial Infarction Maternal Grandmother      Thyroid Cancer Cousin      Hypertension Other      family history     Sleep Apnea Other      family history     Seasonal/Environmental Allergies Other      family history     Snoring Other      family history         Current Outpatient Prescriptions   Medication Sig Dispense Refill     cephALEXin (KEFLEX) 500 MG capsule Take 1 capsule (500 mg) by mouth 2 times daily for 7 days 13 capsule 0     hydrochlorothiazide 12.5 MG TABS tablet TAKE 1 TABLET(12.5 MG) BY MOUTH DAILY 90 tablet 2     meclizine (ANTIVERT) 25 MG tablet Take 1 tablet (25 mg) by mouth 3 times daily as needed for dizziness 30 tablet 0     ARMOUR THYROID 30 MG tablet TAKE 1 TABLET(30 MG) BY MOUTH DAILY 90 tablet 3     SUMAtriptan (IMITREX) 100 MG tablet TAKE ONE TABLET BY MOUTH AS NEEDED 10 tablet 1      neomycin-polymyxin-dexamethasone (MAXITROL) 3.5-89183-7.1 OINT ophthalmic ointment Apply 1/2 ribbon to periocular area 3-4 times a day for one week, then use as needed for severe itching 2 Tube 1     sertraline (ZOLOFT) 50 MG tablet Take 1 tablet (50 mg) by mouth daily 90 tablet 3     mometasone (NASONEX) 50 MCG/ACT nasal spray Spray 2 sprays into both nostrils daily 1 Box 11     ZOLPIDEM TARTRATE PO Take by mouth nightly as needed for sleep       loratadine (CLARITIN) 10 MG tablet Take 1 tablet (10 mg) by mouth daily (Patient taking differently: Take 10 mg by mouth as needed ) 30 tablet 1     pimecrolimus (ELIDEL) 1 % cream Apply topically 2 times daily 60 g 0     No Known Allergies  Recent Labs   Lab Test  08/08/17   1327  05/01/17   1215  06/23/16   1049  05/24/16   1647   01/28/16   1046  12/04/14   1132   LDL   --   110*   --    --    --   158*  125   HDL   --   91   --    --    --   102  125   TRIG   --   163*   --    --    --   75  110   ALT   --   27   --   25   --   46  23   CR   --   0.75   --   0.75   --   0.86  0.81   GFRESTIMATED   --   81   --   82   --   70  75   GFRESTBLACK   --   >90   GFR Calc     --   >90   GFR Calc     --   85  >90   GFR Calc     POTASSIUM  3.9  3.9   --   3.9   --   4.1  4.2   TSH   --   0.85  1.84  2.21   < >  11.36*  1.32    < > = values in this interval not displayed.      BP Readings from Last 3 Encounters:   10/11/17 126/64   10/08/17 132/61   07/20/17 108/70    Wt Readings from Last 3 Encounters:   10/11/17 149 lb (67.6 kg)   07/20/17 149 lb (67.6 kg)   06/02/17 150 lb (68 kg)                          Reviewed and updated as needed this visit by clinical staffAllergies       Reviewed and updated as needed this visit by Provider         ROS:  Constitutional, HEENT, cardiovascular, pulmonary, gi and gu systems are negative, except as otherwise noted.      OBJECTIVE:                                                    /64  (BP Location: Left arm, Patient Position: Chair, Cuff Size: Adult Large)  Pulse 75  Temp 97.3  F (36.3  C) (Tympanic)  Wt 149 lb (67.6 kg)  LMP 05/15/2015  SpO2 96%  Breastfeeding? No  BMI 28.15 kg/m2  Body mass index is 28.15 kg/(m^2).  GENERAL APPEARANCE: healthy, alert and no distress  MS: has left great toe partial avulsion. Granulation tissue is nice and clean.  Discharge is clear.  Mild swelling on lateral region. No odor   SKIN: clean toe injury. No discoloration or bruise.     Diagnostic test results:  Diagnostic Test Results:  none        ASSESSMENT/PLAN:                                                    1. Avulsion of toenail, subsequent encounter  This is just a small part of the nail on the lateral side of the nail on the distal tip along the cuticle margin. Should heal in as the base looks intact.  If not can send on to podiatry to do partial removal of this side.  Will let me know.       See Patient Instructions    MUKESH Leon  [facility]

## 2017-10-11 NOTE — PATIENT INSTRUCTIONS
Thank you for choosing [facility].   I have office hours 8:00 am to 4:30 pm on Monday's, Wednesday's, Thursday's and Friday's. My nurse and I are out of the office every Tuesday.    Following your visit, when your labs and diagnostic testing have returned, I will review then and you will be contacted by my nurse.  If you are on My Chart, you can also view results there.    For refills, notify your pharmacy regarding what you need and the pharmacy will generate a refill request. Do not call my nurse as she is unable to process refill request. Please plan ahead and allow 3-5 days for refill requests.    You will generally receive a reminder call the day prior to your appointment.  If you cannot attend your appointment, please cancel your appointment with as much notice as possible.  If there is a pattern of failure to present for your appointments, I cannot provide consistent, meaningful, ongoing care for you. It is very important to me that you come in for your care, so we can best assist you with your health care needs.    IMPORTANT:  Please note that it is my standard of practice to NOT participate in prescribing ongoing requested Narcotic Analgesic therapy, and/or participate in the prescribing of other controlled substances.  My nurse and I am happy to assist you with the process of referral for alternative pain management as needed, and other treatment modalities including but not limited to:  Physical Therapy, Physical Medicine and Rehab, Counseling, Chiropractic Care, Orthopedic Care, and non-narcotic medication management.     In the event that you need to be seen for emergent concerns and I am out of office,  please see one of my colleagues for acute concerns.  You may also present to  or ER.  I appreciate the opportunity to serve you and look forward to supporting your healthcare needs in the future. Please contact me with any questions or concerns that you may  have.    Sincerely,      Tracey Leary RN, PA-C

## 2017-10-11 NOTE — MR AVS SNAPSHOT
After Visit Summary   10/11/2017    Mattie Edwards    MRN: [id number]           Patient Information     Date Of Birth          1965        Visit Information        Provider Department      10/11/2017 2:45 PM Tracey Leary PA [facility]        Today's Diagnoses     Avulsion of toenail, subsequent encounter    -  1      Care Instructions      Thank you for choosing [facility].   I have office hours 8:00 am to 4:30 pm on Monday's, Wednesday's, Thursday's and Friday's. My nurse and I are out of the office every Tuesday.    Following your visit, when your labs and diagnostic testing have returned, I will review then and you will be contacted by my nurse.  If you are on My Chart, you can also view results there.    For refills, notify your pharmacy regarding what you need and the pharmacy will generate a refill request. Do not call my nurse as she is unable to process refill request. Please plan ahead and allow 3-5 days for refill requests.    You will generally receive a reminder call the day prior to your appointment.  If you cannot attend your appointment, please cancel your appointment with as much notice as possible.  If there is a pattern of failure to present for your appointments, I cannot provide consistent, meaningful, ongoing care for you. It is very important to me that you come in for your care, so we can best assist you with your health care needs.    IMPORTANT:  Please note that it is my standard of practice to NOT participate in prescribing ongoing requested Narcotic Analgesic therapy, and/or participate in the prescribing of other controlled substances.  My nurse and I am happy to assist you with the process of referral for alternative pain management as needed, and other treatment modalities including but not limited to:  Physical Therapy, Physical Medicine and Rehab, Counseling, Chiropractic Care, Orthopedic Care, and non-narcotic medication  management.     In the event that you need to be seen for emergent concerns and I am out of office,  please see one of my colleagues for acute concerns.  You may also present to UC or ER.  I appreciate the opportunity to serve you and look forward to supporting your healthcare needs in the future. Please contact me with any questions or concerns that you may have.    Sincerely,      Tracey Leary RN, PA-C               Follow-ups after your visit        Who to contact     If you have questions or need follow up information about today's clinic visit or your schedule please contact [facility] [place] directly at [phone].  Normal or non-critical lab and imaging results will be communicated to you by MyChart, letter or phone within 4 business days after the clinic has received the results. If you do not hear from us within 7 days, please contact the clinic through [employer] or phone. If you have a critical or abnormal lab result, we will notify you by phone as soon as possible.  Submit refill requests through [employer] or call your pharmacy and they will forward the refill request to us. Please allow 3 business days for your refill to be completed.          Additional Information About Your Visit        MyChart Information     [employer] gives you secure access to your electronic health record. If you see a primary care provider, you can also send messages to your care team and make appointments. If you have questions, please call your primary care clinic.  If you do not have a primary care provider, please call [phone] and they will assist you.        Care EveryWhere ID     This is your Care EveryWhere ID. This could be used by other organizations to access your [facility] medical records  [id number]        Your Vitals Were     Pulse Temperature Last Period Pulse Oximetry Breastfeeding? BMI (Body Mass Index)    75 97.3  F (36.3  C) (Tympanic) 05/15/2015 96% No 28.15 kg/m2       Blood Pressure from Last 3  Encounters:   10/11/17 126/64   10/08/17 132/61   07/20/17 108/70    Weight from Last 3 Encounters:   10/11/17 149 lb (67.6 kg)   07/20/17 149 lb (67.6 kg)   06/02/17 150 lb (68 kg)              Today, you had the following     No orders found for display         Today's Medication Changes          These changes are accurate as of: 10/11/17  3:36 PM.  If you have any questions, ask your nurse or doctor.               These medicines have changed or have updated prescriptions.        Dose/Directions    loratadine 10 MG tablet   Commonly known as:  CLARITIN   This may have changed:    - when to take this  - reasons to take this   Used for:  Allergic reaction, initial encounter        Dose:  10 mg   Take 1 tablet (10 mg) by mouth daily   Quantity:  30 tablet   Refills:  1                Primary Care Provider Office Phone # Fax #    MUKESH Wallis [phone]       [facility] [address]  [place] [address]        Equal Access to Services     [facility] AH: Hadii aad ku hadasho Soomaali, waaxda luqadaha, qaybta kaalmada adeegyada, waxay idiin haysofyn nitza sims . So [facility] [phone].    ATENCIÓN: Si habla español, tiene a potter disposición servicios gratuitos de asistencia lingüística. [facility] [phone].    We comply with applicable federal civil rights laws and [place] laws. We do not discriminate on the basis of race, color, national origin, age, disability, sex, sexual orientation, or gender identity.            Thank you!     Thank you for choosing [facility] [place]  for your care. Our goal is always to provide you with excellent care. Hearing back from our patients is one way we can continue to improve our services. Please take a few minutes to complete the written survey that you may receive in the mail after your visit with us. Thank you!             Your Updated Medication List - Protect others around you: Learn how to safely use, store and throw  away your medicines at www.disposemymeds.org.          This list is accurate as of: 10/11/17  3:36 PM.  Always use your most recent med list.                   Brand Name Dispense Instructions for use Diagnosis    ARMOUR THYROID 30 MG tablet   Generic drug:  thyroid     90 tablet    TAKE 1 TABLET(30 MG) BY MOUTH DAILY    Hypothyroidism       cephALEXin 500 MG capsule    KEFLEX    13 capsule    Take 1 capsule (500 mg) by mouth 2 times daily for 7 days        hydrochlorothiazide 12.5 MG Tabs tablet     90 tablet    TAKE 1 TABLET(12.5 MG) BY MOUTH DAILY    Meniere's disease, unspecified laterality       loratadine 10 MG tablet    CLARITIN    30 tablet    Take 1 tablet (10 mg) by mouth daily    Allergic reaction, initial encounter       meclizine 25 MG tablet    ANTIVERT    30 tablet    Take 1 tablet (25 mg) by mouth 3 times daily as needed for dizziness    Meniere's disease, unspecified laterality, Vertigo       mometasone 50 MCG/ACT spray    NASONEX    1 Box    Spray 2 sprays into both nostrils daily    Recurrent sinus infections       neomycin-polymyxin-dexamethasone 3.5-85831-3.1 Oint ophthalmic ointment    MAXITROL    2 Tube    Apply 1/2 ribbon to periocular area 3-4 times a day for one week, then use as needed for severe itching    Periocular dermatitis       pimecrolimus 1 % cream    ELIDEL    60 g    Apply topically 2 times daily    Allergic reaction, initial encounter       sertraline 50 MG tablet    ZOLOFT    90 tablet    Take 1 tablet (50 mg) by mouth daily    Major depressive disorder, single episode, mild (H)       SUMAtriptan 100 MG tablet    IMITREX    10 tablet    TAKE ONE TABLET BY MOUTH AS NEEDED    Intractable episodic paroxysmal hemicrania       ZOLPIDEM TARTRATE PO      Take by mouth nightly as needed for sleep

## 2017-10-12 ASSESSMENT — ANXIETY QUESTIONNAIRES: GAD7 TOTAL SCORE: 0

## 2018-03-02 DIAGNOSIS — G44.031 INTRACTABLE EPISODIC PAROXYSMAL HEMICRANIA: ICD-10-CM

## 2018-03-02 RX ORDER — SUMATRIPTAN 100 MG/1
TABLET, FILM COATED ORAL
Qty: 10 TABLET | Refills: 1 | Status: SHIPPED | OUTPATIENT
Start: 2018-03-02 | End: 2018-03-19

## 2018-03-19 RX ORDER — SUMATRIPTAN 100 MG/1
TABLET, FILM COATED ORAL
Qty: 10 TABLET | Refills: 1 | Status: SHIPPED | OUTPATIENT
Start: 2018-03-19 | End: 2019-06-27

## 2018-05-03 ENCOUNTER — OFFICE VISIT (OUTPATIENT)
Dept: FAMILY MEDICINE | Facility: OTHER | Age: 53
End: 2018-05-03
Attending: PHYSICIAN ASSISTANT
Payer: COMMERCIAL

## 2018-05-03 VITALS
DIASTOLIC BLOOD PRESSURE: 60 MMHG | HEART RATE: 72 BPM | TEMPERATURE: 97.8 F | WEIGHT: 154 LBS | BODY MASS INDEX: 29.07 KG/M2 | SYSTOLIC BLOOD PRESSURE: 90 MMHG | OXYGEN SATURATION: 97 % | HEIGHT: 61 IN

## 2018-05-03 DIAGNOSIS — Z01.419 WELL WOMAN EXAM: Primary | ICD-10-CM

## 2018-05-03 DIAGNOSIS — R21 RASH AND NONSPECIFIC SKIN ERUPTION: ICD-10-CM

## 2018-05-03 DIAGNOSIS — R53.83 FATIGUE, UNSPECIFIED TYPE: ICD-10-CM

## 2018-05-03 DIAGNOSIS — H81.03 MENIERE'S DISEASE, BILATERAL: ICD-10-CM

## 2018-05-03 DIAGNOSIS — Z12.31 ENCOUNTER FOR SCREENING MAMMOGRAM FOR BREAST CANCER: ICD-10-CM

## 2018-05-03 PROBLEM — H15.003 SCLERITIS AND EPISCLERITIS OF BOTH EYES: Status: ACTIVE | Noted: 2018-05-03

## 2018-05-03 PROBLEM — H15.103 SCLERITIS AND EPISCLERITIS OF BOTH EYES: Status: ACTIVE | Noted: 2018-05-03

## 2018-05-03 LAB
ALBUMIN SERPL-MCNC: 4 G/DL (ref 3.4–5)
ALBUMIN UR-MCNC: 10 MG/DL
ALP SERPL-CCNC: 78 U/L (ref 40–150)
ALT SERPL W P-5'-P-CCNC: 24 U/L (ref 0–50)
ANION GAP SERPL CALCULATED.3IONS-SCNC: 7 MMOL/L (ref 3–14)
APPEARANCE UR: CLEAR
AST SERPL W P-5'-P-CCNC: 16 U/L (ref 0–45)
BACTERIA #/AREA URNS HPF: ABNORMAL /HPF
BILIRUB SERPL-MCNC: 0.3 MG/DL (ref 0.2–1.3)
BILIRUB UR QL STRIP: NEGATIVE
BUN SERPL-MCNC: 17 MG/DL (ref 7–30)
CALCIUM SERPL-MCNC: 9.1 MG/DL (ref 8.5–10.1)
CHLORIDE SERPL-SCNC: 103 MMOL/L (ref 94–109)
CHOLEST SERPL-MCNC: 240 MG/DL
CO2 SERPL-SCNC: 29 MMOL/L (ref 20–32)
COLOR UR AUTO: YELLOW
CREAT SERPL-MCNC: 0.8 MG/DL (ref 0.52–1.04)
CRP SERPL-MCNC: <2.9 MG/L (ref 0–8)
ERYTHROCYTE [DISTWIDTH] IN BLOOD BY AUTOMATED COUNT: 12.6 % (ref 10–15)
ERYTHROCYTE [SEDIMENTATION RATE] IN BLOOD BY WESTERGREN METHOD: 13 MM/H (ref 0–30)
GFR SERPL CREATININE-BSD FRML MDRD: 75 ML/MIN/1.7M2
GLUCOSE SERPL-MCNC: 90 MG/DL (ref 70–99)
GLUCOSE UR STRIP-MCNC: NEGATIVE MG/DL
HCT VFR BLD AUTO: 42.1 % (ref 35–47)
HDLC SERPL-MCNC: 77 MG/DL
HGB BLD-MCNC: 14.2 G/DL (ref 11.7–15.7)
HGB UR QL STRIP: NEGATIVE
KETONES UR STRIP-MCNC: NEGATIVE MG/DL
LDLC SERPL CALC-MCNC: 143 MG/DL
LEUKOCYTE ESTERASE UR QL STRIP: NEGATIVE
MCH RBC QN AUTO: 29.3 PG (ref 26.5–33)
MCHC RBC AUTO-ENTMCNC: 33.7 G/DL (ref 31.5–36.5)
MCV RBC AUTO: 87 FL (ref 78–100)
NITRATE UR QL: NEGATIVE
NONHDLC SERPL-MCNC: 163 MG/DL
PH UR STRIP: 7 PH (ref 4.7–8)
PLATELET # BLD AUTO: 284 10E9/L (ref 150–450)
POTASSIUM SERPL-SCNC: 4.2 MMOL/L (ref 3.4–5.3)
PROT SERPL-MCNC: 8.1 G/DL (ref 6.8–8.8)
RBC # BLD AUTO: 4.85 10E12/L (ref 3.8–5.2)
RBC #/AREA URNS AUTO: 1 /HPF (ref 0–2)
SODIUM SERPL-SCNC: 139 MMOL/L (ref 133–144)
SOURCE: ABNORMAL
SP GR UR STRIP: 1.02 (ref 1–1.03)
TRIGL SERPL-MCNC: 98 MG/DL
TSH SERPL DL<=0.005 MIU/L-ACNC: 1.02 MU/L (ref 0.4–4)
UROBILINOGEN UR STRIP-MCNC: NORMAL MG/DL (ref 0–2)
WBC # BLD AUTO: 5.8 10E9/L (ref 4–11)
WBC #/AREA URNS AUTO: <1 /HPF (ref 0–5)

## 2018-05-03 PROCEDURE — 86618 LYME DISEASE ANTIBODY: CPT | Mod: 90 | Performed by: PHYSICIAN ASSISTANT

## 2018-05-03 PROCEDURE — 81001 URINALYSIS AUTO W/SCOPE: CPT | Performed by: PHYSICIAN ASSISTANT

## 2018-05-03 PROCEDURE — 80061 LIPID PANEL: CPT | Performed by: PHYSICIAN ASSISTANT

## 2018-05-03 PROCEDURE — 84443 ASSAY THYROID STIM HORMONE: CPT | Performed by: PHYSICIAN ASSISTANT

## 2018-05-03 PROCEDURE — 86140 C-REACTIVE PROTEIN: CPT | Performed by: PHYSICIAN ASSISTANT

## 2018-05-03 PROCEDURE — 86225 DNA ANTIBODY NATIVE: CPT | Mod: 90 | Performed by: PHYSICIAN ASSISTANT

## 2018-05-03 PROCEDURE — 99396 PREV VISIT EST AGE 40-64: CPT | Performed by: PHYSICIAN ASSISTANT

## 2018-05-03 PROCEDURE — 80053 COMPREHEN METABOLIC PANEL: CPT | Performed by: PHYSICIAN ASSISTANT

## 2018-05-03 PROCEDURE — 87389 HIV-1 AG W/HIV-1&-2 AB AG IA: CPT | Mod: 90 | Performed by: PHYSICIAN ASSISTANT

## 2018-05-03 PROCEDURE — 36415 COLL VENOUS BLD VENIPUNCTURE: CPT | Performed by: PHYSICIAN ASSISTANT

## 2018-05-03 PROCEDURE — 85027 COMPLETE CBC AUTOMATED: CPT | Performed by: PHYSICIAN ASSISTANT

## 2018-05-03 PROCEDURE — 99000 SPECIMEN HANDLING OFFICE-LAB: CPT | Performed by: PHYSICIAN ASSISTANT

## 2018-05-03 PROCEDURE — 85652 RBC SED RATE AUTOMATED: CPT | Performed by: PHYSICIAN ASSISTANT

## 2018-05-03 PROCEDURE — 86431 RHEUMATOID FACTOR QUANT: CPT | Mod: 90 | Performed by: PHYSICIAN ASSISTANT

## 2018-05-03 RX ORDER — TRIAMCINOLONE ACETONIDE 1 MG/G
CREAM TOPICAL
Qty: 80 G | Refills: 0 | Status: SHIPPED | OUTPATIENT
Start: 2018-05-03 | End: 2020-07-30

## 2018-05-03 ASSESSMENT — PAIN SCALES - GENERAL: PAINLEVEL: NO PAIN (0)

## 2018-05-03 NOTE — PATIENT INSTRUCTIONS
Thank you for choosing [facility].   I have office hours 8:00 am to 4:30 pm on Monday's, Wednesday's, Thursday's and Friday's. My nurse and I are out of the office every Tuesday.    Following your visit, when your labs and diagnostic testing have returned, I will review then and you will be contacted by my nurse.  If you are on My Chart, you can also view results there.    For refills, notify your pharmacy regarding what you need and the pharmacy will generate a refill request. Do not call my nurse as she is unable to process refill request. Please plan ahead and allow 3-5 days for refill requests.    You will generally receive a reminder call the day prior to your appointment.  If you cannot attend your appointment, please cancel your appointment with as much notice as possible.  If there is a pattern of failure to present for your appointments, I cannot provide consistent, meaningful, ongoing care for you. It is very important to me that you come in for your care, so we can best assist you with your health care needs.    IMPORTANT:  Please note that it is my standard of practice to NOT participate in prescribing ongoing requested Narcotic Analgesic therapy, and/or participate in the prescribing of other controlled substances.  My nurse and I am happy to assist you with the process of referral for alternative pain management as needed, and other treatment modalities including but not limited to:  Physical Therapy, Physical Medicine and Rehab, Counseling, Chiropractic Care, Orthopedic Care, and non-narcotic medication management.     In the event that you need to be seen for emergent concerns and I am out of office,  please see one of my colleagues for acute concerns.  You may also present to  or ER.  I appreciate the opportunity to serve you and look forward to supporting your healthcare needs in the future. Please contact me with any questions or concerns that you may  have.    Sincerely,      Tracey Leary RN, PA-C

## 2018-05-03 NOTE — PROGRESS NOTES
SUBJECTIVE:   CC: Mattie Edwards is an 52 year old woman who presents for preventive health visit.     Physical   Annual:     Getting at least 3 servings of Calcium per day::  Yes    Bi-annual eye exam::  Yes    Dental care twice a year::  Yes    Sleep apnea or symptoms of sleep apnea::  Daytime drowsiness and Excessive snoring    Diet::  Regular (no restrictions)    Frequency of exercise::  None    Taking medications regularly::  Yes    Medication side effects::  Not applicable    Additional concerns today::  YES                Rash      Duration: early winter.     Description  Location: elbows, forearm and ears.   Itching: moderate    Intensity:  moderate    Accompanying signs and symptoms: episcleritis     History (similar episodes/previous evaluation): None    Precipitating or alleviating factors:  New exposures:  None  Recent travel: no      Therapies tried and outcome: topical creams emollient.     Bobbi's  Follow-up      Taking her HCTZ daily now. Has low blood pressure from this.     Low Salt Diet: no added salt    Hypothyroidism Follow-up      Since last visit, patient describes the following symptoms: Weight stable, no hair loss, no skin changes, no constipation, no loose stools      Today's PHQ-2 Score:   PHQ-2 ( 1999 Pfizer) 5/2/2018   Q1: Little interest or pleasure in doing things 0   Q2: Feeling down, depressed or hopeless 0   PHQ-2 Score 0   Q1: Little interest or pleasure in doing things Not at all   Q2: Feeling down, depressed or hopeless Not at all   PHQ-2 Score 0       Abuse: Current or Past(Physical, Sexual or Emotional)- No  Do you feel safe in your environment - Yes    Social History   Substance Use Topics     Smoking status: Never Smoker     Smokeless tobacco: Never Used      Comment: no passive exposure     Alcohol use No     Alcohol Use 5/2/2018   If you drink alcohol do you typically have greater than 3 drinks per day OR greater than 7 drinks per week? No   No flowsheet data  found.    Reviewed orders with patient.  Reviewed health maintenance and updated orders accordingly - Yes  Labs reviewed in EPIC  BP Readings from Last 3 Encounters:   05/03/18 90/60   10/11/17 126/64   10/08/17 132/61    Wt Readings from Last 3 Encounters:   05/03/18 154 lb (69.9 kg)   10/11/17 149 lb (67.6 kg)   07/20/17 149 lb (67.6 kg)                  Patient Active Problem List   Diagnosis     Major depressive disorder, single episode, mild (H)     Left Thyroid nodules     hypothyroidism     Perennial allergic rhinitis     Chronic maxillary sinusitis     Chronic ethmoidal sinusitis     Nasal turbinate hypertrophy     ACP (advance care planning)     Scleritis and episcleritis of both eyes     Past Surgical History:   Procedure Laterality Date     NO HISTORY OF SURGERY         Social History   Substance Use Topics     Smoking status: Never Smoker     Smokeless tobacco: Never Used      Comment: no passive exposure     Alcohol use No     Family History   Problem Relation Age of Onset     CANCER Father      brain; cause of death     Myocardial Infarction Paternal Grandfather      Myocardial Infarction Maternal Grandmother      Thyroid Cancer Cousin      Hypertension Other      family history     Sleep Apnea Other      family history     Seasonal/Environmental Allergies Other      family history     Snoring Other      family history         Current Outpatient Prescriptions   Medication Sig Dispense Refill     triamcinolone (KENALOG) 0.1 % cream Apply sparingly to affected area three times daily as needed 80 g 0     ARMOUR THYROID 30 MG tablet TAKE 1 TABLET(30 MG) BY MOUTH DAILY 90 tablet 3     hydrochlorothiazide 12.5 MG TABS tablet TAKE 1 TABLET(12.5 MG) BY MOUTH DAILY 90 tablet 2     loratadine (CLARITIN) 10 MG tablet Take 1 tablet (10 mg) by mouth daily (Patient taking differently: Take 10 mg by mouth as needed ) 30 tablet 1     meclizine (ANTIVERT) 25 MG tablet Take 1 tablet (25 mg) by mouth 3 times daily as  needed for dizziness 30 tablet 0     mometasone (NASONEX) 50 MCG/ACT nasal spray Spray 2 sprays into both nostrils daily 1 Box 11     neomycin-polymyxin-dexamethasone (MAXITROL) 3.5-56529-3.1 OINT ophthalmic ointment Apply 1/2 ribbon to periocular area 3-4 times a day for one week, then use as needed for severe itching 2 Tube 1     pimecrolimus (ELIDEL) 1 % cream Apply topically 2 times daily 60 g 0     sertraline (ZOLOFT) 50 MG tablet Take 1 tablet (50 mg) by mouth daily 90 tablet 3     SUMAtriptan (IMITREX) 100 MG tablet TAKE ONE TABLET BY MOUTH AS NEEDED 10 tablet 1     ZOLPIDEM TARTRATE PO Take by mouth nightly as needed for sleep       No Known Allergies  Recent Labs   Lab Test  08/08/17   1327  05/01/17   1215  06/23/16   1049  05/24/16   1647   01/28/16   1046  12/04/14   1132   LDL   --   110*   --    --    --   158*  125   HDL   --   91   --    --    --   102  125   TRIG   --   163*   --    --    --   75  110   ALT   --   27   --   25   --   46  23   CR   --   0.75   --   0.75   --   0.86  0.81   GFRESTIMATED   --   81   --   82   --   70  75   GFRESTBLACK   --   >90   GFR Calc     --   >90   GFR Calc     --   85  >90   GFR Calc     POTASSIUM  3.9  3.9   --   3.9   --   4.1  4.2   TSH   --   0.85  1.84  2.21   < >  11.36*  1.32    < > = values in this interval not displayed.        Patient over age 50, mutual decision to screen reflected in health maintenance.    Pertinent mammograms are reviewed under the imaging tab.  History of abnormal Pap smear:   Last 3 Pap and HPV Results:   PAP / HPV 1/27/2016   PAP NIL       Reviewed and updated as needed this visit by clinical staff  Tobacco  Allergies  Meds  Med Hx  Surg Hx  Fam Hx  Soc Hx        Reviewed and updated as needed this visit by Provider          Past Medical History:   Diagnosis Date     Major depressive disorder, single episode, mild (H) 9/19/2011     Thyroid disease       Past Surgical History:  "  Procedure Laterality Date     NO HISTORY OF SURGERY       Obstetric History     No data available          Review of Systems  CONSTITUTIONAL: NEGATIVE for fever, chills, change in weight  INTEGUMENTARY/SKIN: NEGATIVE for worrisome rashes, moles or lesions  EYES: NEGATIVE for vision changes or irritation  ENT: NEGATIVE for ear, mouth and throat problems  RESP: NEGATIVE for significant cough or SOB  BREAST: NEGATIVE for masses, tenderness or discharge  CV: NEGATIVE for chest pain, palpitations or peripheral edema  GI: NEGATIVE for nausea, abdominal pain, heartburn, or change in bowel habits  : NEGATIVE for unusual urinary or vaginal symptoms. No vaginal bleeding.  MUSCULOSKELETAL: NEGATIVE for significant arthralgias or myalgia  NEURO: NEGATIVE for weakness, dizziness or paresthesias  ENDOCRINE: NEGATIVE for temperature intolerance, skin/hair changes  HEME/ALLERGY/IMMUNE: NEGATIVE for bleeding problems  PSYCHIATRIC: NEGATIVE for changes in mood or affect      OBJECTIVE:   BP 90/60  Pulse 72  Temp 97.8  F (36.6  C)  Ht 5' 1\" (1.549 m)  Wt 154 lb (69.9 kg)  LMP 05/15/2015  SpO2 97%  BMI 29.1 kg/m2  Physical Exam  GENERAL APPEARANCE: healthy, alert and no distress  EYES: Eyes grossly normal to inspection, PERRL and conjunctivae and sclerae normal  HENT: ear canals and TM's normal, nose and mouth without ulcers or lesions, oropharynx clear and oral mucous membranes moist  NECK: no adenopathy, no asymmetry, masses, or scars and thyroid normal to palpation  RESP: lungs clear to auscultation - no rales, rhonchi or wheezes  BREAST: normal without masses, tenderness or nipple discharge and no palpable axillary masses or adenopathy  CV: regular rate and rhythm, normal S1 S2, no S3 or S4, no murmur, click or rub, no peripheral edema and peripheral pulses strong  ABDOMEN: soft, nontender, no hepatosplenomegaly, no masses and bowel sounds normal  MS: no musculoskeletal defects are noted and gait is age appropriate " "without ataxia  SKIN: no suspicious lesions, has skin rashes on her elbows and forearms behind ears.  Worsening. No joint pain with this rash.  Appears silvery in spots.   NEURO: Normal strength and tone, sensory exam grossly normal, mentation intact and speech normal  PSYCH: mentation appears normal and affect normal/bright    ASSESSMENT/PLAN:   1. Well woman exam  She is feeling \"her age\"  Actually older.  No energy and feeling down and out. In bed a lot in the last montht with aching.  Had a rash with this.  We will be working this up for her.  Her pap is not due, mammogram will be done.  Weight is stable.  No issues with her emotions. Has hx of Meniere and this is acting up a little bit.     2. Fatigue, unspecified type  Severe.  Low energy awaiting labs to return and then will make a plan   - CRP inflammation  - TSH with free T4 reflex    3. Rash and nonspecific skin eruption  Will return for biopsy of the rash as time did not allow.  Labs below.   - CRP inflammation  - Rheumatoid factor  - Erythrocyte sedimentation rate auto  - Lyme Disease Angelica with reflex to WB Serum  - DNA double stranded antibodies  - triamcinolone (KENALOG) 0.1 % cream; Apply sparingly to affected area three times daily as needed  Dispense: 80 g; Refill: 0    COUNSELING:  Reviewed preventive health counseling, as reflected in patient instructions       Regular exercise       Healthy diet/nutrition       Vision screening       Hearing screening       Osteoporosis Prevention/Bone Health       Colon cancer screening       HIV screeninx in teen years, 1x in adult years, and at intervals if high risk       Advance Care Planning         reports that she has never smoked. She has never used smokeless tobacco.    Estimated body mass index is 29.1 kg/(m^2) as calculated from the following:    Height as of this encounter: 5' 1\" (1.549 m).    Weight as of this encounter: 154 lb (69.9 kg).       Counseling Resources:  ATP IV Guidelines  Pooled " Cohorts Equation Calculator  Breast Cancer Risk Calculator  FRAX Risk Assessment  ICSI Preventive Guidelines  Dietary Guidelines for Americans, 2010  [place]'s MyPlate  ASA Prophylaxis  Lung CA Screening    MUKESH Leon  [facility] [place]  Answers for HPI/ROS submitted by the patient on 5/2/2018   PHQ-2 Score: 0

## 2018-05-03 NOTE — MR AVS SNAPSHOT
After Visit Summary   5/3/2018    Mattie Edwards    MRN: [id number]           Patient Information     Date Of Birth          1965        Visit Information        Provider Department      5/3/2018 10:00 AM Tracey Leary PA [facility]        Today's Diagnoses     Well woman exam    -  1    Fatigue, unspecified type        Rash and nonspecific skin eruption        Encounter for screening mammogram for breast cancer        Meniere's disease, bilateral          Care Instructions      Thank you for choosing [facility].   I have office hours 8:00 am to 4:30 pm on Monday's, Wednesday's, Thursday's and Friday's. My nurse and I are out of the office every Tuesday.    Following your visit, when your labs and diagnostic testing have returned, I will review then and you will be contacted by my nurse.  If you are on My Chart, you can also view results there.    For refills, notify your pharmacy regarding what you need and the pharmacy will generate a refill request. Do not call my nurse as she is unable to process refill request. Please plan ahead and allow 3-5 days for refill requests.    You will generally receive a reminder call the day prior to your appointment.  If you cannot attend your appointment, please cancel your appointment with as much notice as possible.  If there is a pattern of failure to present for your appointments, I cannot provide consistent, meaningful, ongoing care for you. It is very important to me that you come in for your care, so we can best assist you with your health care needs.    IMPORTANT:  Please note that it is my standard of practice to NOT participate in prescribing ongoing requested Narcotic Analgesic therapy, and/or participate in the prescribing of other controlled substances.  My nurse and I am happy to assist you with the process of referral for alternative pain management as needed, and other treatment modalities including but not  limited to:  Physical Therapy, Physical Medicine and Rehab, Counseling, Chiropractic Care, Orthopedic Care, and non-narcotic medication management.     In the event that you need to be seen for emergent concerns and I am out of office,  please see one of my colleagues for acute concerns.  You may also present to  or ER.  I appreciate the opportunity to serve you and look forward to supporting your healthcare needs in the future. Please contact me with any questions or concerns that you may have.    Sincerely,      Tracey Leary RN, PA-C               Follow-ups after your visit        Who to contact     If you have questions or need follow up information about today's clinic visit or your schedule please contact [facility] directly at [phone].  Normal or non-critical lab and imaging results will be communicated to you by [employer], letter or phone within 4 business days after the clinic has received the results. If you do not hear from us within 7 days, please contact the clinic through [employer] or phone. If you have a critical or abnormal lab result, we will notify you by phone as soon as possible.  Submit refill requests through [employer] or call your pharmacy and they will forward the refill request to us. Please allow 3 business days for your refill to be completed.          Additional Information About Your Visit        [employer] Information     [employer] gives you secure access to your electronic health record. If you see a primary care provider, you can also send messages to your care team and make appointments. If you have questions, please call your primary care clinic.  If you do not have a primary care provider, please call [phone] and they will assist you.        Care EveryWhere ID     This is your Care EveryWhere ID. This could be used by other organizations to access your [facility] medical records  [id number]        Your Vitals Were     Pulse Temperature Height Last Period Pulse  "Oximetry BMI (Body Mass Index)    72 97.8  F (36.6  C) 5' 1\" (1.549 m) 05/15/2015 97% 29.1 kg/m2       Blood Pressure from Last 3 Encounters:   05/03/18 90/60   10/11/17 126/64   10/08/17 132/61    Weight from Last 3 Encounters:   05/03/18 154 lb (69.9 kg)   10/11/17 149 lb (67.6 kg)   07/20/17 149 lb (67.6 kg)              We Performed the Following     CBC with platelets     Comprehensive metabolic panel     CRP inflammation     DNA double stranded antibodies     Erythrocyte sedimentation rate auto     HIV Antigen Antibody Combo     Lipid Profile     Lyme Disease Angelica with reflex to WB Serum     MA Screening Digital Bilateral     Rheumatoid factor     TSH with free T4 reflex     UA with Microscopic reflex to Culture          Today's Medication Changes          These changes are accurate as of 5/3/18 10:40 AM.  If you have any questions, ask your nurse or doctor.               Start taking these medicines.        Dose/Directions    triamcinolone 0.1 % cream   Commonly known as:  KENALOG   Used for:  Rash and nonspecific skin eruption   Started by:  Tracey Leary PA        Apply sparingly to affected area three times daily as needed   Quantity:  80 g   Refills:  0         These medicines have changed or have updated prescriptions.        Dose/Directions    loratadine 10 MG tablet   Commonly known as:  CLARITIN   This may have changed:    - when to take this  - reasons to take this   Used for:  Allergic reaction, initial encounter        Dose:  10 mg   Take 1 tablet (10 mg) by mouth daily   Quantity:  30 tablet   Refills:  1            Where to get your medicines      These medications [facility] [address] [place] [address] BENJI BHATT  [address] AT [facility] [address], [place] [address]     Phone:  [phone]     triamcinolone 0.1 % cream                Primary Care Provider Office Phone # Fax #    MUKESH Wallis [phone] [address] [facility]  [facility] [address]        Equal Access to Services     [facility]: Hadii johanna ku hadnora Soshericeali, waaxda luqadaha, qaybta kaalmada nitzadejaisa, sheela scottdioneeva emmanuel. So [facility] [place] [facility] [phone].    ATENCIÓN: Si habla español, tiene a potter disposición servicios gratuitos de asistencia lingüística. Llame al [phone].    We comply with applicable federal civil rights laws and [place] laws. We do not discriminate on the basis of race, color, national origin, age, disability, sex, sexual orientation, or gender identity.            Thank you!     Thank you for choosing [facility]  for your care. Our goal is always to provide you with excellent care. Hearing back from our patients is one way we can continue to improve our services. Please take a few minutes to complete the written survey that you may receive in the mail after your visit with us. Thank you!             Your Updated Medication List - Protect others around you: Learn how to safely use, store and throw away your medicines at www.disposemymeds.org.          This list is accurate as of 5/3/18 10:40 AM.  Always use your most recent med list.                   Brand Name Dispense Instructions for use Diagnosis    ARMOUR THYROID 30 MG tablet   Generic drug:  thyroid     90 tablet    TAKE 1 TABLET(30 MG) BY MOUTH DAILY    Hypothyroidism       hydrochlorothiazide 12.5 MG Tabs tablet     90 tablet    TAKE 1 TABLET(12.5 MG) BY MOUTH DAILY    Meniere's disease, unspecified laterality       loratadine 10 MG tablet    CLARITIN    30 tablet    Take 1 tablet (10 mg) by mouth daily    Allergic reaction, initial encounter       meclizine 25 MG tablet    ANTIVERT    30 tablet    Take 1 tablet (25 mg) by mouth 3 times daily as needed for dizziness    Meniere's disease, unspecified laterality, Vertigo       mometasone 50 MCG/ACT spray    NASONEX    1 Box    Spray 2 sprays into both nostrils daily    Recurrent sinus infections        neomycin-polymyxin-dexamethasone 3.5-30481-6.1 Oint ophthalmic ointment    MAXITROL    2 Tube    Apply 1/2 ribbon to periocular area 3-4 times a day for one week, then use as needed for severe itching    Periocular dermatitis       pimecrolimus 1 % cream    ELIDEL    60 g    Apply topically 2 times daily    Allergic reaction, initial encounter       sertraline 50 MG tablet    ZOLOFT    90 tablet    Take 1 tablet (50 mg) by mouth daily    Major depressive disorder, single episode, mild (H)       SUMAtriptan 100 MG tablet    IMITREX    10 tablet    TAKE ONE TABLET BY MOUTH AS NEEDED    Intractable episodic paroxysmal hemicrania       triamcinolone 0.1 % cream    KENALOG    80 g    Apply sparingly to affected area three times daily as needed    Rash and nonspecific skin eruption       ZOLPIDEM TARTRATE PO      Take by mouth nightly as needed for sleep

## 2018-05-03 NOTE — NURSING NOTE
"Chief Complaint   Patient presents with     Physical       Initial BP 90/60  Pulse 72  Temp 97.8  F (36.6  C)  Ht 5' 1\" (1.549 m)  Wt 154 lb (69.9 kg)  LMP 05/15/2015  SpO2 97%  BMI 29.1 kg/m2 Estimated body mass index is 29.1 kg/(m^2) as calculated from the following:    Height as of this encounter: 5' 1\" (1.549 m).    Weight as of this encounter: 154 lb (69.9 kg).  Medication Reconciliation: complete   [facility]   Medical Assistant      "

## 2018-05-04 LAB
B BURGDOR IGG+IGM SER QL: 0.06 (ref 0–0.89)
RHEUMATOID FACT SER NEPH-ACNC: <20 IU/ML (ref 0–20)

## 2018-05-07 LAB
DSDNA AB SER-ACNC: 3 IU/ML
HIV 1+2 AB+HIV1 P24 AG SERPL QL IA: NONREACTIVE

## 2018-05-08 NOTE — PROGRESS NOTES
"  SUBJECTIVE:   Mattie Edwards is a 52 year old female who presents to clinic today for the following health issues:        Dermatology       Duration: 1 week follow up     Description (location/character/radiation): biopsy of rash of right elbow and forearm. patient in last week and discussed biopsy with provider     Intensity:  0/10    Accompanying signs and symptoms: redness and itchy and scaly      History (similar episodes/previous evaluation): None    Precipitating or alleviating factors: None    Therapies tried and outcome: kenalog cream        {additional problems for provider to add:789265}    Problem list and histories reviewed & adjusted, as indicated.  Additional history: {NONE - AS DOCUMENTED:258521::\"as documented\"}    {HIST REVIEW/ LINKS 2:877917}    Reviewed and updated as needed this visit by clinical staff       Reviewed and updated as needed this visit by Provider         {PROVIDER CHARTING PREFERENCE:656991}  "

## 2018-05-09 ENCOUNTER — OFFICE VISIT (OUTPATIENT)
Dept: FAMILY MEDICINE | Facility: OTHER | Age: 53
End: 2018-05-09
Attending: PHYSICIAN ASSISTANT
Payer: COMMERCIAL

## 2018-05-09 VITALS
SYSTOLIC BLOOD PRESSURE: 104 MMHG | HEART RATE: 72 BPM | WEIGHT: 150 LBS | TEMPERATURE: 97 F | OXYGEN SATURATION: 98 % | BODY MASS INDEX: 28.34 KG/M2 | DIASTOLIC BLOOD PRESSURE: 60 MMHG

## 2018-05-09 DIAGNOSIS — L98.9 CHANGING SKIN LESION: Primary | ICD-10-CM

## 2018-05-09 PROCEDURE — 88305 TISSUE EXAM BY PATHOLOGIST: CPT | Mod: TC | Performed by: PHYSICIAN ASSISTANT

## 2018-05-09 PROCEDURE — 11100 HC BIOPSY SKIN/SUBQ/MUC MEM, SINGLE LESION: CPT | Performed by: PHYSICIAN ASSISTANT

## 2018-05-09 PROCEDURE — 99213 OFFICE O/P EST LOW 20 MIN: CPT | Mod: 25 | Performed by: PHYSICIAN ASSISTANT

## 2018-05-09 ASSESSMENT — PAIN SCALES - GENERAL: PAINLEVEL: NO PAIN (0)

## 2018-05-09 NOTE — MR AVS SNAPSHOT
After Visit Summary   5/9/2018    Mattie Edwards    MRN: [id number]           Patient Information     Date Of Birth          1965        Visit Information        Provider Department      5/9/2018 9:00 AM Tracey Leary PA [facility]        Today's Diagnoses     Changing skin lesion    -  1      Care Instructions                   Wound Closure and Wound Care  What is wound closure?   Wounds heal more quickly and with less risk of infection and scarring when the wound is cleaned and the wound edges are held together (closed). Scrapes, scratches, puncture wounds, and shallow cuts may need only cleaning, ointment, and a bandage. Some cuts may need to be closed with tape strips called Steri-Strips or tissue adhesive liquid (skin glue). If a cut or surgical incision is deep, very long, jagged, or under a lot of tension (such as a cut over a joint), stitches (also called sutures) or staples may be needed to close the wound.   How do I take care of my wound and sutures?   If you get an accidental cut, put pressure on the wound with a gauze pad or clean cloth right away to stop the bleeding. Then gently but thoroughly wash it with soap and cool water. Soapy water can be used around, but not in the cut. Try to remove all dirt and debris but do not scrub vigorously. If you decide to get medical treatment, cover the wound and apply pressure as needed to control bleeding while traveling to your healthcare provider's office, urgent care clinic, or emergency room.   After a wound is closed by your healthcare provider, the wound and the area around it must be kept clean and dry. The care of a stapled wound is similar to the care of a sutured wound. There are minor differences in caring for a wound closed with skin glue.   Do not let a wound closed with stitches or staples get wet for the first 24 hours. After 24 hours, you can shower or you can clean the wound with hydrogen peroxide or  "gently wash it with soap and warm water twice a day.   If your wound was closed with skin glue, keep the wound dry for the first 4 hours after the skin glue was put on. After the first 4 hours, you may occasionally and briefly wet the wound in the shower. You can clean the wound with hydrogen peroxide or gently wash it with soap and water twice a day.   If your wound is closed with Steri-Strips, they may be more likely to separate if they get wet. Keep them dry for the first few days while you're in the shower or bath.   Do not soak or scrub the wound. Do not take a bath, go swimming, or use a hot tub.   If recommended by your healthcare provider, you may put a small amount of antibiotic ointment on the wound each time you clean it. This can prevent infection. It will also help keep bandages from sticking to the wound. If a rash appears, stop using the ointment. If your wound is closed with skin glue, do not put liquid, antibiotic ointment, or any other product on the wound while the adhesive is in place. It may loosen the film before the wound is healed.   Make sure the wound is kept dry between washings. After showering or bathing, gently pat the wound dry with a soft towel.   Your healthcare provider may recommend that you cover the wound with gauze or a new, bandage to keep it from getting dirty. Be sure to keep the bandage dry. Put on a new bandage after cleaning the wound of if the old one gets dirty or wet.   Your healthcare provider may recommend leaving the wound \"open to air\" and not covered by a bandage while you sleep to help speed up the healing process. If the wound was closed with skin glue, you do not need a bandage.   For the first 1 or 2 days keep the area propped up higher than your heart. This will help lessen your pain and any swelling.   Protect the wound from repeat injury until the skin has had time to heal.   Protect the wound from a lot of exposure to sunlight or tanning lamps while skin " glue is in place. Wounds exposed to the sun can become red, while scars that have not been exposed to the sun usually turn white after a period of time.   Do not scratch, rub, or pick at your stitches, staples, or skin glue. This may cause them to loosen before the wound is healed.   Avoid activities that will make you sweat a lot until the skin glue has naturally fallen off or the stitches or staples have been removed.   Any wound can become infected. When you are cleaning your wound, look for these signs of infection:   increased redness   red streaks   increased swelling   increased pain or tenderness   pus or other drainage   warmth in the area of the wound   fever.   Contact your provider if you see any signs of infection.   If your wound was accidental, be sure to ask if a tetanus booster is needed. Treatment of accidental wounds may include taking an oral antibiotic to help prevent infection. Be sure to take the medicine until it is completely gone. Do not stop taking it just because the wound looks like it is healing well.   When are stitches, staples, or other types of wound closures removed?   Steri-Strips are usually left on until they fall off. If they have not fallen off after 2 weeks, they should be removed. Skin glue usually falls off on its own in 5 to 10 days.   For deep cuts the first stitches are placed under the skin. These stitches are made of materials that dissolve and do not need to be removed. Sutures or staples on the surface of the skin need to be removed by your healthcare provider 5 to 14 days after they are put in. The length of time depends on where the cut is. Sutures in wounds on the face usually can be removed after 5 to 7 days. In areas of high stress, such as hands, knees, or elbows, the sutures must stay in 10 to 14 days. Your provider will tell you when you should come to the office for removal of your sutures or staples. Do NOT remove sutures or staples yourself unless your  provider instructs you to do so. Staples are removed using a special tool. If you don't have the tool, don't try to remove the staples.   When should I call my healthcare provider?   Some swelling, redness, and pain are common with all wounds and normally go away as the wound heals.   Call your provider right away if:   You start to have any signs or symptoms of infection. These include:   Your skin is redder or more painful.   You have red streaks from the wound going toward your heart.   The wound area is very warm to touch.   You have pus or other fluid coming from the wound area.   You have a fever higher than 101.5? F (38.6? C).   You have chills, nausea, vomiting, or muscle aches.   The wound seems to be opening up or you notice any drainage.   The wound bleeds for more than 10 minutes.   The stitches or staples are loose.   The skin glue is loosening before it is supposed to.   You have any symptoms that worry you.     Published by [employer].  This content is reviewed periodically and is subject to change as new health information becomes available. The information is intended to inform and educate and is not a replacement for medical evaluation, advice, diagnosis or treatment by a healthcare professional.   Written by Lg Antoine MD.   ? 2010 [employer] and/or its affiliates. All Rights Reserved.   Copyright   [employer] 2011                Follow-ups after your visit        Who to contact     If you have questions or need follow up information about today's clinic visit or your schedule please contact [facility] directly at [phone].  Normal or non-critical lab and imaging results will be communicated to you by MyChart, letter or phone within 4 business days after the clinic has received the results. If you do not hear from us within 7 days, please contact the clinic through MyChart or phone. If you have a critical or abnormal lab result, we will notify you by phone  as soon as possible.  Submit refill requests through [employer] or call your pharmacy and they will forward the refill request to us. Please allow 3 business days for your refill to be completed.          Additional Information About Your Visit        [employer] Information     [employer] gives you secure access to your electronic health record. If you see a primary care provider, you can also send messages to your care team and make appointments. If you have questions, please call your primary care clinic.  If you do not have a primary care provider, please call [phone] and they will assist you.        Care EveryWhere ID     This is your Care EveryWhere ID. This could be used by other organizations to access your [facility] medical records  [id number]        Your Vitals Were     Pulse Temperature Last Period Pulse Oximetry Breastfeeding? BMI (Body Mass Index)    72 97  F (36.1  C) (Tympanic) 05/15/2015 98% No 28.34 kg/m2       Blood Pressure from Last 3 Encounters:   05/09/18 104/60   05/03/18 90/60   10/11/17 126/64    Weight from Last 3 Encounters:   05/09/18 150 lb (68 kg)   05/03/18 154 lb (69.9 kg)   10/11/17 149 lb (67.6 kg)              We Performed the Following     Single Lesion          Today's Medication Changes          These changes are accurate as of 5/9/18 10:39 AM.  If you have any questions, ask your nurse or doctor.               These medicines have changed or have updated prescriptions.        Dose/Directions    loratadine 10 MG tablet   Commonly known as:  CLARITIN   This may have changed:    - when to take this  - reasons to take this   Used for:  Allergic reaction, initial encounter        Dose:  10 mg   Take 1 tablet (10 mg) by mouth daily   Quantity:  30 tablet   Refills:  1                Primary Care Provider Office Phone # Fax #    MUKESH Wallis [phone]       [address] [place] [id number]        Equal Access to Services     NATASHA SEBASTIAN: Jovita hughes  waldemar Siddiqi, wathierryda lurockyadaha, qaybta kamacda abhijit, sheela watt rodneylinclon tylerjesús laclaritzayumiko cholo. So [facility] [phone].    ATENCIÓN: Si alycia kaur, tiene a potter disposición servicios gratuitos de asistencia lingüística. Robert al [phone].    We comply with applicable federal civil rights laws and [place] laws. We do not discriminate on the basis of race, color, national origin, age, disability, sex, sexual orientation, or gender identity.            Thank you!     Thank you for [facility]  [facility] your [facility]. Our goal is always to provide you with excellent care. Hearing back from our patients is one way we can continue to improve our services. Please take a few minutes to complete the written survey that you may receive in the mail after your visit with us. Thank you!             Your Updated Medication List - Protect others around you: Learn how to safely use, store and throw away your medicines at www.disposemymeds.org.          This list is accurate as of 5/9/18 10:39 AM.  Always use your most recent med list.                   Brand Name Dispense Instructions for use Diagnosis    ARMOUR THYROID 30 MG tablet   Generic drug:  thyroid     90 tablet    TAKE 1 TABLET(30 MG) BY MOUTH DAILY    Hypothyroidism       hydrochlorothiazide 12.5 MG Tabs tablet     90 tablet    TAKE 1 TABLET(12.5 MG) BY MOUTH DAILY    Meniere's disease, unspecified laterality       loratadine 10 MG tablet    CLARITIN    30 tablet    Take 1 tablet (10 mg) by mouth daily    Allergic reaction, initial encounter       meclizine 25 MG tablet    ANTIVERT    30 tablet    Take 1 tablet (25 mg) by mouth 3 times daily as needed for dizziness    Meniere's disease, unspecified laterality, Vertigo       mometasone 50 MCG/ACT spray    NASONEX    1 Box    Spray 2 sprays into both nostrils daily    Recurrent sinus infections       neomycin-polymyxin-dexamethasone 3.5-27400-8.1 Oint ophthalmic ointment    MAXITROL    2 Tube     Apply 1/2 ribbon to periocular area 3-4 times a day for one week, then use as needed for severe itching    Periocular dermatitis       pimecrolimus 1 % cream    ELIDEL    60 g    Apply topically 2 times daily    Allergic reaction, initial encounter       sertraline 50 MG tablet    ZOLOFT    90 tablet    Take 1 tablet (50 mg) by mouth daily    Major depressive disorder, single episode, mild (H)       SUMAtriptan 100 MG tablet    IMITREX    10 tablet    TAKE ONE TABLET BY MOUTH AS NEEDED    Intractable episodic paroxysmal hemicrania       triamcinolone 0.1 % cream    KENALOG    80 g    Apply sparingly to affected area three times daily as needed    Rash and nonspecific skin eruption       ZOLPIDEM TARTRATE PO      Take by mouth nightly as needed for sleep

## 2018-05-09 NOTE — NURSING NOTE
"Chief Complaint   Patient presents with     Derm Problem     biopsy        Initial /60 (BP Location: Left arm, Patient Position: Chair, Cuff Size: Adult Regular)  Pulse 72  Temp 97  F (36.1  C) (Tympanic)  Wt 150 lb (68 kg)  LMP 05/15/2015  SpO2 98%  Breastfeeding? No  BMI 28.34 kg/m2 Estimated body mass index is 28.34 kg/(m^2) as calculated from the following:    Height as of 5/3/18: 5' 1\" (1.549 m).    Weight as of this encounter: 150 lb (68 kg).  Medication Reconciliation: complete    Saba Valdez MA    "

## 2018-05-09 NOTE — LETTER
My Depression Action Plan  Name: Mattie Edwards   Date of Birth 1965  Date: 5/8/2018    My doctor: Tracey Leary   My clinic: [facility] [place]  [place] [address]  [phone]          GREEN    ZONE   Good Control    What it looks like:     Things are going generally well. You have normal up s and down s. You may even feel depressed from time to time, but bad moods usually last less than a day.   What you need to do:  1. Continue to care for yourself (see self care plan)  2. Check your depression survival kit and update it as needed  3. Follow your physician s recommendations including any medication.  4. Do not stop taking medication unless you consult with your physician first.           YELLOW         ZONE Getting Worse    What it looks like:     Depression is starting to interfere with your life.     It may be hard to get out of bed; you may be starting to isolate yourself from others.    Symptoms of depression are starting to last most all day and this has happened for several days.     You may have suicidal thoughts but they are not constant.   What you need to do:     1. Call your care team, your response to treatment will improve if you keep your care team informed of your progress. Yellow periods are signs an adjustment may need to be made.     2. Continue your self-care, even if you have to fake it!    3. Talk to someone in your support network    4. Open up your depression survival kit           RED    ZONE Medical Alert - Get Help    What it looks like:     Depression is seriously interfering with your life.     You may experience these or other symptoms: You can t get out of bed most days, can t work or engage in other necessary activities, you have trouble taking care of basic hygiene, or basic responsibilities, thoughts of suicide or death that will not go away, self-injurious behavior.     What you need to do:  1. Call your care team and request a  same-day appointment. If they are not available (weekends or after hours) call your local crisis line, emergency room or 911.            Depression Self Care Plan / Survival Kit    Self-Care for Depression  Here s the deal. Your body and mind are really not as separate as most people think.  What you do and think affects how you feel and how you feel influences what you do and think. This means if you do things that people who feel good do, it will help you feel better.  Sometimes this is all it takes.  There is also a place for medication and therapy depending on how severe your depression is, so be sure to consult with your medical provider and/ or Behavioral Health Consultant if your symptoms are worsening or not improving.     In order to better manage my stress, I will:    Exercise  Get some form of exercise, every day. This will help reduce pain and release endorphins, the  feel good  chemicals in your brain. This is almost as good as taking antidepressants!  This is not the same as joining a gym and then never going! (they count on that by the way ) It can be as simple as just going for a walk or doing some gardening, anything that will get you moving.      Hygiene   Maintain good hygiene (Get out of bed in the morning, Make your bed, Brush your teeth, Take a shower, and Get dressed like you were going to work, even if you are unemployed).  If your clothes don't fit try to get ones that do.    Diet  I will strive to eat foods that are good for me, drink plenty of water, and avoid excessive sugar, caffeine, alcohol, and other mood-altering substances.  Some foods that are helpful in depression are: complex carbohydrates, B vitamins, flaxseed, fish or fish oil, fresh fruits and vegetables.    Psychotherapy  I agree to participate in Individual Therapy (if recommended).    Medication  If prescribed medications, I agree to take them.  Missing doses can result in serious side effects.  I understand that drinking  alcohol, or other illicit drug use, may cause potential side effects.  I will not stop my medication abruptly without first discussing it with my provider.    Staying Connected With Others  I will stay in touch with my friends, family members, and my primary care provider/team.    Use your imagination  Be creative.  We all have a creative side; it doesn t matter if it s oil painting, sand castles, or mud pies! This will also kick up the endorphins.    Witness Beauty  (AKA stop and smell the roses) Take a look outside, even in mid-winter. Notice colors, textures. Watch the squirrels and birds.     Service to others  Be of service to others.  There is always someone else in need.  By helping others we can  get out of ourselves  and remember the really important things.  This also provides opportunities for practicing all the other parts of the program.    Humor  Laugh and be silly!  Adjust your TV habits for less news and crime-drama and more comedy.    Control your stress  Try breathing deep, massage therapy, biofeedback, and meditation. Find time to relax each day.     My support system    Clinic Contact:  Phone number:    Contact 1:  Phone number:    Contact 2:  Phone number:    [place]:  Phone number:    Therapist:  Phone number:    Local crisis center:    Phone number:    Other community support:  Phone number:

## 2018-05-09 NOTE — PATIENT INSTRUCTIONS
Wound Closure and Wound Care  What is wound closure?   Wounds heal more quickly and with less risk of infection and scarring when the wound is cleaned and the wound edges are held together (closed). Scrapes, scratches, puncture wounds, and shallow cuts may need only cleaning, ointment, and a bandage. Some cuts may need to be closed with tape strips called Steri-Strips or tissue adhesive liquid (skin glue). If a cut or surgical incision is deep, very long, jagged, or under a lot of tension (such as a cut over a joint), stitches (also called sutures) or staples may be needed to close the wound.   How do I take care of my wound and sutures?   If you get an accidental cut, put pressure on the wound with a gauze pad or clean cloth right away to stop the bleeding. Then gently but thoroughly wash it with soap and cool water. Soapy water can be used around, but not in the cut. Try to remove all dirt and debris but do not scrub vigorously. If you decide to get medical treatment, cover the wound and apply pressure as needed to control bleeding while traveling to your healthcare provider's office, urgent care clinic, or emergency room.   After a wound is closed by your healthcare provider, the wound and the area around it must be kept clean and dry. The care of a stapled wound is similar to the care of a sutured wound. There are minor differences in caring for a wound closed with skin glue.   Do not let a wound closed with stitches or staples get wet for the first 24 hours. After 24 hours, you can shower or you can clean the wound with hydrogen peroxide or gently wash it with soap and warm water twice a day.   If your wound was closed with skin glue, keep the wound dry for the first 4 hours after the skin glue was put on. After the first 4 hours, you may occasionally and briefly wet the wound in the shower. You can clean the wound with hydrogen peroxide or gently wash it with soap and water twice a day.   If  "your wound is closed with Steri-Strips, they may be more likely to separate if they get wet. Keep them dry for the first few days while you're in the shower or bath.   Do not soak or scrub the wound. Do not take a bath, go swimming, or use a hot tub.   If recommended by your healthcare provider, you may put a small amount of antibiotic ointment on the wound each time you clean it. This can prevent infection. It will also help keep bandages from sticking to the wound. If a rash appears, stop using the ointment. If your wound is closed with skin glue, do not put liquid, antibiotic ointment, or any other product on the wound while the adhesive is in place. It may loosen the film before the wound is healed.   Make sure the wound is kept dry between washings. After showering or bathing, gently pat the wound dry with a soft towel.   Your healthcare provider may recommend that you cover the wound with gauze or a new, bandage to keep it from getting dirty. Be sure to keep the bandage dry. Put on a new bandage after cleaning the wound of if the old one gets dirty or wet.   Your healthcare provider may recommend leaving the wound \"open to air\" and not covered by a bandage while you sleep to help speed up the healing process. If the wound was closed with skin glue, you do not need a bandage.   For the first 1 or 2 days keep the area propped up higher than your heart. This will help lessen your pain and any swelling.   Protect the wound from repeat injury until the skin has had time to heal.   Protect the wound from a lot of exposure to sunlight or tanning lamps while skin glue is in place. Wounds exposed to the sun can become red, while scars that have not been exposed to the sun usually turn white after a period of time.   Do not scratch, rub, or pick at your stitches, staples, or skin glue. This may cause them to loosen before the wound is healed.   Avoid activities that will make you sweat a lot until the skin glue has " naturally fallen off or the stitches or staples have been removed.   Any wound can become infected. When you are cleaning your wound, look for these signs of infection:   increased redness   red streaks   increased swelling   increased pain or tenderness   pus or other drainage   warmth in the area of the wound   fever.   Contact your provider if you see any signs of infection.   If your wound was accidental, be sure to ask if a tetanus booster is needed. Treatment of accidental wounds may include taking an oral antibiotic to help prevent infection. Be sure to take the medicine until it is completely gone. Do not stop taking it just because the wound looks like it is healing well.   When are stitches, staples, or other types of wound closures removed?   Steri-Strips are usually left on until they fall off. If they have not fallen off after 2 weeks, they should be removed. Skin glue usually falls off on its own in 5 to 10 days.   For deep cuts the first stitches are placed under the skin. These stitches are made of materials that dissolve and do not need to be removed. Sutures or staples on the surface of the skin need to be removed by your healthcare provider 5 to 14 days after they are put in. The length of time depends on where the cut is. Sutures in wounds on the face usually can be removed after 5 to 7 days. In areas of high stress, such as hands, knees, or elbows, the sutures must stay in 10 to 14 days. Your provider will tell you when you should come to the office for removal of your sutures or staples. Do NOT remove sutures or staples yourself unless your provider instructs you to do so. Staples are removed using a special tool. If you don't have the tool, don't try to remove the staples.   When should I call my healthcare provider?   Some swelling, redness, and pain are common with all wounds and normally go away as the wound heals.   Call your provider right away if:   You start to have any signs or  symptoms of infection. These include:   Your skin is redder or more painful.   You have red streaks from the wound going toward your heart.   The wound area is very warm to touch.   You have pus or other fluid coming from the wound area.   You have a fever higher than 101.5? F (38.6? C).   You have chills, nausea, vomiting, or muscle aches.   The wound seems to be opening up or you notice any drainage.   The wound bleeds for more than 10 minutes.   The stitches or staples are loose.   The skin glue is loosening before it is supposed to.   You have any symptoms that worry you.     Published by [employer].  This content is reviewed periodically and is subject to change as new health information becomes available. The information is intended to inform and educate and is not a replacement for medical evaluation, advice, diagnosis or treatment by a healthcare professional.   Written by Lg Antoine MD.   ? 2010 [employer] and/or its affiliates. All Rights Reserved.   Copyright   [employer] 2011

## 2018-05-09 NOTE — PROGRESS NOTES
SUBJECTIVE:   Mattie Edwards is a 52 year old female who presents to clinic today for the following health issues:        Dermatology       Duration: 1 week follow up     Description (location/character/radiation): biopsy of rash of right elbow and forearm. patient in last week and discussed biopsy with provider     Intensity:  0/10    Accompanying signs and symptoms: redness and itchy and scaly      History (similar episodes/previous evaluation): None    Precipitating or alleviating factors: None    Therapies tried and outcome: kenalog cream          Subjective: Mattie Edwards a 52 year old female who presents today for lesion removal. The lesion(s) is/are located on the arms, number 1 and measures large patch x 2  She The patient reports the lesion is painfull and denies other significant symptoms on ROS. Medications reviewed.    Pause for the cause has been completed prior to the prceedure.   1. Mattie was identified by both name and date of birth   2. The correct site was identified   3. Site was marked by provider    4. Written informed consent correct and signed or verbal authorization  to proceed was obtained   5. Verifed necessary supplies, equipment, and diagnostics are available    6. Time out was performed immediately prior to procedure    Objective: The lesion(s) is/are of the above mentioned size and location and is/are erythematous. The area was prepped and appropriately anesthetized. Using the usual technique, punch biopsy size 4 mm was performed. An appropriate dressing was applied. The procedure was well tolerated and without complications.     Assessment: dermatitis psoriasis vs atopic lichenified.     Plan: Follow up: The specimen is labelled and sent to pathology for evaluation., Return for suture removal in 10 days.. Wound care instructions provided.  Patient was instructed to be alert for any signs of cutaneous infection.   Wound Care Instructions    1.  Keep area dry today.    2.   Starting tomorrow wash gently with soap and water once daily.      3.  Apply Vaseline or antibiotic ointment to the area and cover with a bandage if desired.  Do not let it dry out and form a scab as this will make the resulting scar more noticeable.    4. Protect the area from sun for up to one year afterward as the scar is continuing to remodel.  Sun exposure will also make the resulting scar more noticeable.    5.  Call if the area is very red, tender, has a discharge or is very itchy while healing, or if you have any other questions.  These may be signs of early infection or allergy.

## 2018-05-11 ENCOUNTER — HOSPITAL PATHOLOGY (OUTPATIENT)
Dept: OTHER | Facility: CLINIC | Age: 53
End: 2018-05-11

## 2018-05-14 LAB — COPATH REPORT: NORMAL

## 2018-05-15 LAB — COPATH REPORT: NORMAL

## 2018-07-13 ENCOUNTER — HOSPITAL ENCOUNTER (EMERGENCY)
Facility: HOSPITAL | Age: 53
Discharge: HOME | End: 2018-07-14
Attending: INTERNAL MEDICINE | Admitting: INTERNAL MEDICINE
Payer: COMMERCIAL

## 2018-07-13 ENCOUNTER — APPOINTMENT (OUTPATIENT)
Dept: GENERAL RADIOLOGY | Facility: HOSPITAL | Age: 53
End: 2018-07-13
Attending: FAMILY MEDICINE
Payer: COMMERCIAL

## 2018-07-13 VITALS
SYSTOLIC BLOOD PRESSURE: 154 MMHG | DIASTOLIC BLOOD PRESSURE: 67 MMHG | HEART RATE: 88 BPM | OXYGEN SATURATION: 99 % | RESPIRATION RATE: 16 BRPM | TEMPERATURE: 99.4 F

## 2018-07-13 DIAGNOSIS — S90.31XA CONTUSION OF RIGHT FOOT, INITIAL ENCOUNTER: ICD-10-CM

## 2018-07-13 PROCEDURE — 73630 X-RAY EXAM OF FOOT: CPT | Mod: TC,RT

## 2018-07-13 PROCEDURE — 25000132 ZZH RX MED GY IP 250 OP 250 PS 637: Performed by: INTERNAL MEDICINE

## 2018-07-13 PROCEDURE — 99283 EMERGENCY DEPT VISIT LOW MDM: CPT | Performed by: INTERNAL MEDICINE

## 2018-07-13 PROCEDURE — 99283 EMERGENCY DEPT VISIT LOW MDM: CPT

## 2018-07-13 PROCEDURE — 73610 X-RAY EXAM OF ANKLE: CPT | Mod: TC,RT

## 2018-07-13 RX ORDER — IBUPROFEN 800 MG/1
800 TABLET, FILM COATED ORAL ONCE
Status: COMPLETED | OUTPATIENT
Start: 2018-07-13 | End: 2018-07-13

## 2018-07-13 RX ADMIN — IBUPROFEN 800 MG: 800 TABLET ORAL at 23:53

## 2018-07-13 NOTE — ED AVS SNAPSHOT
[facility]    [address]    [place] [address]    Phone:  [phone]                                       Mattie Edwards   MRN: [id number]    Department:  [facility]   Date of Visit:  7/13/2018           After Visit Summary Signature Page     I have received my discharge instructions, and my questions have been answered. I have discussed any challenges I see with this plan with the nurse or doctor.    ..........................................................................................................................................  Patient/Patient Representative Signature      ..........................................................................................................................................  Patient Representative Print Name and Relationship to Patient    ..................................................               ................................................  Date                                            Time    ..........................................................................................................................................  Reviewed by Signature/Title    ...................................................              ..............................................  Date                                                            Time

## 2018-07-13 NOTE — ED AVS SNAPSHOT
[facility]    [address] [address]    Phone:  [phone]                                       Mattie Edwards   MRN: [id number]    Department:  [facility]   Date of Visit:  7/13/2018           Patient Information     Date Of Birth          1965        Your diagnoses for this visit were:     Contusion of right foot, initial encounter        You were seen by Garth Calvo MD.      Follow-up Information     Schedule an appointment as soon as possible for a visit with Ryan Dai MD.    Specialty:  Orthopedics    Contact information:    [address] [place] [id number]  [phone]          Discharge Instructions         Foot Contusion  You have a contusion. This is also called a bruise. There is swelling and some bleeding under the skin, but no broken bones. This injury generally takes a few days to a few weeks to heal.  During that time, the bruise will typically change in color from reddish, to purple-blue, to greenish-yellow, then to yellow-brown.  Home care    Elevate the foot to reduce pain and swelling. As much as possible, sit or lie down with the foot raised about the level of your heart. This is especially important during the first 48 hours.    Ice the foot to help reduce pain and swelling. Wrap a cold source (ice pack or ice cubes in a plastic bag) in a thin towel. Apply to the bruised area for 20 minutes every 1 to 2 hours the first day. Continue this 3 to 4 times a day until the pain and swelling goes away.    Unless another medicine was prescribed, you can take acetaminophen, ibuprofen, or naproxen to control pain. (If you have chronic liver or kidney disease or ever had a stomach ulcer or gastrointestinal bleeding, talk with your healthcare provider before using these medicines.)  Follow up  Follow up with your healthcare provider or our staff as advised. Call if you are not improving within 1 to 2 weeks.  When to seek medical  advice   Call your healthcare provider right away if you have any of the following:    Increased pain or swelling    Foot or leg becomes cold, blue, numb or tingly    Signs of infection: Warmth, drainage, or increased redness or pain around the bruise    Inability to move the injured foot     Frequent bruising for unknown reasons  Date Last Reviewed: 2/1/2017 2000-2017 [employer]. [address] [place] [address]. All rights reserved. This information is not intended as a substitute for professional medical care. Always follow your healthcare professional's instructions.             Review of your medicines      Our records show that you are taking the medicines listed below. If these are incorrect, please call your family doctor or clinic.        Dose / Directions Last dose taken    ARMOUR THYROID 30 MG tablet   Quantity:  90 tablet   Generic drug:  thyroid        TAKE 1 TABLET(30 MG) BY MOUTH DAILY   Refills:  0        loratadine 10 MG tablet   Commonly known as:  CLARITIN   Dose:  10 mg   Quantity:  30 tablet        Take 1 tablet (10 mg) by mouth daily   Refills:  1        meclizine 25 MG tablet   Commonly known as:  ANTIVERT   Dose:  25 mg   Quantity:  30 tablet        Take 1 tablet (25 mg) by mouth 3 times daily as needed for dizziness   Refills:  0        mometasone 50 MCG/ACT spray   Commonly known as:  NASONEX   Dose:  2 spray   Quantity:  1 Box        Spray 2 sprays into both nostrils daily   Refills:  11        neomycin-polymyxin-dexamethasone 3.5-82008-5.1 Oint ophthalmic ointment   Commonly known as:  MAXITROL   Quantity:  2 Tube        Apply 1/2 ribbon to periocular area 3-4 times a day for one week, then use as needed for severe itching   Refills:  1        sertraline 50 MG tablet   Commonly known as:  ZOLOFT   Dose:  50 mg   Quantity:  90 tablet        Take 1 tablet (50 mg) by mouth daily   Refills:  3        SUMAtriptan 100 MG tablet   Commonly known as:  IMITREX    Quantity:  10 tablet        TAKE ONE TABLET BY MOUTH AS NEEDED   Refills:  1        triamcinolone 0.1 % cream   Commonly known as:  KENALOG   Quantity:  80 g        Apply sparingly to affected area three times daily as needed   Refills:  0        ZOLPIDEM TARTRATE PO        Take by mouth nightly as needed for sleep   Refills:  0                Procedures and tests performed during your visit     XR Ankle Right G/E 3 Views    XR Foot Right G/E 3 Views      Orders Needing Specimen Collection     None      Pending Results     Date and Time Order Name Status Description    7/13/2018 2234 XR Ankle Right G/E 3 Views In process     7/13/2018 2234 XR Foot Right G/E 3 Views In process             Pending Culture Results     No orders found for last 3 day(s).            Thank you for choosing [facility]       Thank you for choosing [facility] for your care. Our goal is always to provide you with excellent care. Hearing back from our patients is one way we can continue to improve our services. Please take a few minutes to complete the written survey that you may receive in the mail after you visit with us. Thank you!        [email or web address] Information     "[employer] gives you secure access to your electronic health record. If you see a primary care provider, you can also send messages to your care team and make appointments. If you have questions, please call your primary care clinic.  If you do not have a primary care provider, please call [phone] and they will assist you.        Care EveryWhere ID     This is your Care EveryWhere ID. This could be used by other organizations to access your [facility] medical records  [id number]        Equal Access to Services     [facility] AH: Hadii johanna Siddiqi, wathierryda lunanci, qaybta kaalmada sheela lacey. So [facility] [phone].    ATENCIÓN: Si habla español, tiene a potter disposición servicios gratuitos de asistencia lingüística. Llame al  [phone].    We comply with applicable federal civil rights laws and [place] laws. We do not discriminate on the basis of race, color, national origin, age, disability, sex, sexual orientation, or gender identity.            After Visit Summary       This is your record. Keep this with you and show to your community pharmacist(s) and doctor(s) at your next visit.

## 2018-07-14 NOTE — ED NOTES
Pt given verbal and written d/c instructions and pt verbalizes understanding. Pt states that she already has a boot at home that she can wear for support. Pt left department ambulatory with significant other.

## 2018-07-14 NOTE — DISCHARGE INSTRUCTIONS
Foot Contusion  You have a contusion. This is also called a bruise. There is swelling and some bleeding under the skin, but no broken bones. This injury generally takes a few days to a few weeks to heal.  During that time, the bruise will typically change in color from reddish, to purple-blue, to greenish-yellow, then to yellow-brown.  Home care    Elevate the foot to reduce pain and swelling. As much as possible, sit or lie down with the foot raised about the level of your heart. This is especially important during the first 48 hours.    Ice the foot to help reduce pain and swelling. Wrap a cold source (ice pack or ice cubes in a plastic bag) in a thin towel. Apply to the bruised area for 20 minutes every 1 to 2 hours the first day. Continue this 3 to 4 times a day until the pain and swelling goes away.    Unless another medicine was prescribed, you can take acetaminophen, ibuprofen, or naproxen to control pain. (If you have chronic liver or kidney disease or ever had a stomach ulcer or gastrointestinal bleeding, talk with your healthcare provider before using these medicines.)  Follow up  Follow up with your healthcare provider or our staff as advised. Call if you are not improving within 1 to 2 weeks.  When to seek medical advice   Call your healthcare provider right away if you have any of the following:    Increased pain or swelling    Foot or leg becomes cold, blue, numb or tingly    Signs of infection: Warmth, drainage, or increased redness or pain around the bruise    Inability to move the injured foot     Frequent bruising for unknown reasons  Date Last Reviewed: 2/1/2017 2000-2017 [employer]. [address] [place] [address], [place] [address]. All rights reserved. This information is not intended as a substitute for professional medical care. Always follow your healthcare professional's instructions.

## 2018-07-20 ASSESSMENT — ENCOUNTER SYMPTOMS
ABDOMINAL PAIN: 0
FEVER: 0
SHORTNESS OF BREATH: 0

## 2018-07-20 NOTE — ED PROVIDER NOTES
History     Chief Complaint   Patient presents with     Ankle Pain     right     Patient is a 53 year old female presenting with foot injury. The history is provided by the patient.   Foot Injury   Location:  Foot  Foot location:  R foot  Pain details:     Quality:  Aching    Severity:  Moderate  Chronicity:  New  Foreign body present:  No foreign bodies  Associated symptoms: no fever          Problem List:    Patient Active Problem List    Diagnosis Date Noted     Scleritis and episcleritis of both eyes 05/03/2018     Priority: Medium     Meniere's disease, bilateral 05/03/2018     Priority: Medium     ACP (advance care planning) 05/01/2017     Priority: Medium     Advance Care Planning 5/1/2017: ACP Review of Chart / Resources Provided:  Reviewed chart for advance care plan.  Mattie Edwards has no plan or code status on file. Discussed available resources and provided with information.   Added by Saba Valdez        Advance Care Planning 5/4/2016: ACP Review of Chart / Resources Provided:  Reviewed chart for advance care plan.  Mattie Edwards has no plan or code status on file. Discussed available resources and provided with information. Confirmed code status reflects current choices pending further ACP discussions.  Confirmed/documented legally designated decision makers.  Added by Alisha Ag             Left Thyroid nodules 03/04/2016     Priority: Medium     1.2, 1.1 cm       hypothyroidism 03/04/2016     Priority: Medium     Perennial allergic rhinitis 03/04/2016     Priority: Medium     Chronic maxillary sinusitis 03/04/2016     Priority: Medium     Chronic ethmoidal sinusitis 03/04/2016     Priority: Medium     Nasal turbinate hypertrophy 03/04/2016     Priority: Medium     Major depressive disorder, single episode, mild (H) 09/19/2011     Priority: Medium        Past Medical History:    Past Medical History:   Diagnosis Date     Major depressive disorder, single episode, mild (H)  9/19/2011     Thyroid disease        Past Surgical History:    Past Surgical History:   Procedure Laterality Date     NO HISTORY OF SURGERY         Family History:    Family History   Problem Relation Age of Onset     Cancer Father      brain; cause of death     Myocardial Infarction Paternal Grandfather      Myocardial Infarction Maternal Grandmother      Thyroid Cancer Cousin      Hypertension Other      family history     Sleep Apnea Other      family history     Seasonal/Environmental Allergies Other      family history     Snoring Other      family history       Social History:  Marital Status:   [2]  Social History   Substance Use Topics     Smoking status: Never Smoker     Smokeless tobacco: Never Used      Comment: no passive exposure     Alcohol use No        Medications:      ARMOUR THYROID 30 MG tablet   loratadine (CLARITIN) 10 MG tablet   meclizine (ANTIVERT) 25 MG tablet   mometasone (NASONEX) 50 MCG/ACT nasal spray   neomycin-polymyxin-dexamethasone (MAXITROL) 3.5-84970-6.1 OINT ophthalmic ointment   SUMAtriptan (IMITREX) 100 MG tablet   triamcinolone (KENALOG) 0.1 % cream   sertraline (ZOLOFT) 50 MG tablet   ZOLPIDEM TARTRATE PO         Review of Systems   Constitutional: Negative for fever.   Respiratory: Negative for shortness of breath.    Cardiovascular: Negative for chest pain.   Gastrointestinal: Negative for abdominal pain.   All other systems reviewed and are negative.      Physical Exam   BP: 154/67  Pulse: 88  Temp: 99.4  F (37.4  C)  Resp: 16  SpO2: 99 %      Physical Exam   Constitutional: No distress.   HENT:   Head: Atraumatic.   Mouth/Throat: Oropharynx is clear and moist. No oropharyngeal exudate.   Eyes: Pupils are equal, round, and reactive to light. No scleral icterus.   Cardiovascular: Normal heart sounds and intact distal pulses.    Pulmonary/Chest: Breath sounds normal. No respiratory distress.   Abdominal: Soft. Bowel sounds are normal. There is no tenderness.    Musculoskeletal: She exhibits no edema.        Right foot: There is tenderness and swelling. There is normal range of motion, no crepitus, no deformity and no laceration.        Feet:    Skin: Skin is warm. No rash noted. She is not diaphoretic.       ED Course     ED Course     Procedures                   No results found for this or any previous visit (from the past 24 hour(s)).    Medications   ibuprofen (ADVIL/MOTRIN) tablet 800 mg (800 mg Oral Given 7/13/18 5518)       Assessments & Plan (with Medical Decision Making)   R foot pain and swelling after rolling it  xrays negative  I advised her to follow up with ortho clinic  She already has boot at home and refuse any other splint/ boot  Dc home  I have reviewed the nursing notes.    I have reviewed the findings, diagnosis, plan and need for follow up with the patient.        Discharge Medication List as of 7/14/2018 12:21 AM          Final diagnoses:   Contusion of right foot, initial encounter       7/13/2018   [facility]     Garth Calvo MD  07/20/18 6288

## 2018-08-30 DIAGNOSIS — E03.9 HYPOTHYROIDISM: ICD-10-CM

## 2018-08-31 RX ORDER — THYROID 30 MG/1
TABLET ORAL
Qty: 90 TABLET | Refills: 0 | Status: SHIPPED | OUTPATIENT
Start: 2018-08-31 | End: 2018-11-29

## 2018-11-29 DIAGNOSIS — E03.9 HYPOTHYROIDISM: ICD-10-CM

## 2018-11-30 NOTE — TELEPHONE ENCOUNTER
RUDY THYROID 30 MG tablet      Last Written Prescription Date:  08/31/2018  Last Fill Quantity: 90,   # refills: 0  Last Office Visit: 05/09/2018  Future Office visit:       Routing refill request to provider for review/approval because:

## 2018-12-03 RX ORDER — THYROID 30 MG/1
TABLET ORAL
Qty: 90 TABLET | Refills: 1 | Status: SHIPPED | OUTPATIENT
Start: 2018-12-03 | End: 2019-05-24

## 2019-02-22 ENCOUNTER — HOSPITAL ENCOUNTER (EMERGENCY)
Facility: HOSPITAL | Age: 54
Discharge: HOME | End: 2019-02-22
Attending: NURSE PRACTITIONER | Admitting: NURSE PRACTITIONER
Payer: COMMERCIAL

## 2019-02-22 VITALS
WEIGHT: 150 LBS | RESPIRATION RATE: 18 BRPM | DIASTOLIC BLOOD PRESSURE: 70 MMHG | HEART RATE: 104 BPM | TEMPERATURE: 100.5 F | HEIGHT: 61 IN | SYSTOLIC BLOOD PRESSURE: 130 MMHG | OXYGEN SATURATION: 97 % | BODY MASS INDEX: 28.32 KG/M2

## 2019-02-22 DIAGNOSIS — J10.1 INFLUENZA A: ICD-10-CM

## 2019-02-22 PROCEDURE — 99213 OFFICE O/P EST LOW 20 MIN: CPT | Mod: Z6 | Performed by: NURSE PRACTITIONER

## 2019-02-22 PROCEDURE — G0463 HOSPITAL OUTPT CLINIC VISIT: HCPCS

## 2019-02-22 RX ORDER — CODEINE PHOSPHATE AND GUAIFENESIN 10; 100 MG/5ML; MG/5ML
1-2 SOLUTION ORAL EVERY 4 HOURS PRN
Qty: 120 ML | Refills: 0 | Status: SHIPPED | OUTPATIENT
Start: 2019-02-22 | End: 2019-06-27

## 2019-02-22 RX ORDER — OSELTAMIVIR PHOSPHATE 75 MG/1
75 CAPSULE ORAL 2 TIMES DAILY
Qty: 10 CAPSULE | Refills: 0 | Status: SHIPPED | OUTPATIENT
Start: 2019-02-22 | End: 2019-06-27

## 2019-02-22 ASSESSMENT — ENCOUNTER SYMPTOMS
NAUSEA: 1
MYALGIAS: 1
SINUS PAIN: 0
FEVER: 1
ABDOMINAL PAIN: 0
VOMITING: 0
SORE THROAT: 0
ARTHRALGIAS: 1
APPETITE CHANGE: 1
DYSURIA: 0
FATIGUE: 1
HEADACHES: 1
CHILLS: 1
SINUS PRESSURE: 0
COUGH: 1

## 2019-02-22 ASSESSMENT — MIFFLIN-ST. JEOR: SCORE: 1222.78

## 2019-02-22 NOTE — ED PROVIDER NOTES
History     Chief Complaint   Patient presents with     Flu Symptoms     X 1 day,  recently diagnosed with Influenza A     HPI  Mattie Edwards is a 53 year old female who presents today with a CC of body aches, cough, fever since yesterday.  She was exposed to her  who just tested positive for influenza.  She has been taking over the counter cold medicine without much improvement.  She denies shortness of breath or chest pain.      Allergies:  No Known Allergies    Problem List:    Patient Active Problem List    Diagnosis Date Noted     Scleritis and episcleritis of both eyes 05/03/2018     Priority: Medium     Meniere's disease, bilateral 05/03/2018     Priority: Medium     ACP (advance care planning) 05/01/2017     Priority: Medium     Advance Care Planning 5/1/2017: ACP Review of Chart / Resources Provided:  Reviewed chart for advance care plan.  Mattie Edwards has no plan or code status on file. Discussed available resources and provided with information.   Added by Saba Valdez        Advance Care Planning 5/4/2016: ACP Review of Chart / Resources Provided:  Reviewed chart for advance care plan.  Mattie Edwards has no plan or code status on file. Discussed available resources and provided with information. Confirmed code status reflects current choices pending further ACP discussions.  Confirmed/documented legally designated decision makers.  Added by Alisha Ag             Left Thyroid nodules 03/04/2016     Priority: Medium     1.2, 1.1 cm       hypothyroidism 03/04/2016     Priority: Medium     Perennial allergic rhinitis 03/04/2016     Priority: Medium     Chronic maxillary sinusitis 03/04/2016     Priority: Medium     Chronic ethmoidal sinusitis 03/04/2016     Priority: Medium     Nasal turbinate hypertrophy 03/04/2016     Priority: Medium     Major depressive disorder, single episode, mild (H) 09/19/2011     Priority: Medium        Past Medical History:    Past Medical  History:   Diagnosis Date     Major depressive disorder, single episode, mild (H) 9/19/2011     Thyroid disease        Past Surgical History:    Past Surgical History:   Procedure Laterality Date     NO HISTORY OF SURGERY         Family History:    Family History   Problem Relation Age of Onset     Cancer Father         brain; cause of death     Myocardial Infarction Paternal Grandfather      Myocardial Infarction Maternal Grandmother      Thyroid Cancer Cousin      Hypertension Other         family history     Sleep Apnea Other         family history     Seasonal/Environmental Allergies Other         family history     Snoring Other         family history       Social History:  Marital Status:   [2]  Social History     Tobacco Use     Smoking status: Never Smoker     Smokeless tobacco: Never Used     Tobacco comment: no passive exposure   Substance Use Topics     Alcohol use: No     Drug use: No        Medications:      guaiFENesin-codeine (ROBITUSSIN AC) 100-10 MG/5ML solution   oseltamivir (TAMIFLU) 75 MG capsule   ARMOUR THYROID 30 MG tablet   loratadine (CLARITIN) 10 MG tablet   meclizine (ANTIVERT) 25 MG tablet   mometasone (NASONEX) 50 MCG/ACT nasal spray   neomycin-polymyxin-dexamethasone (MAXITROL) 3.5-55986-0.1 OINT ophthalmic ointment   sertraline (ZOLOFT) 50 MG tablet   SUMAtriptan (IMITREX) 100 MG tablet   triamcinolone (KENALOG) 0.1 % cream   ZOLPIDEM TARTRATE PO         Review of Systems   Constitutional: Positive for appetite change (decreased for solids, she has been drinking fluids well), chills, fatigue and fever.   HENT: Positive for congestion. Negative for ear pain, sinus pressure, sinus pain and sore throat.    Respiratory: Positive for cough.    Gastrointestinal: Positive for nausea. Negative for abdominal pain and vomiting.   Genitourinary: Negative for decreased urine volume and dysuria.   Musculoskeletal: Positive for arthralgias and myalgias.   Neurological: Positive for headaches.  "      Physical Exam   BP: 130/70  Pulse: 104  Temp: 100.5  F (38.1  C)  Resp: 18  Height: 154.9 cm (5' 1\")  Weight: 68 kg (150 lb)  SpO2: 97 %      Physical Exam   Constitutional: She appears well-developed. She is cooperative.  Non-toxic appearance. She appears ill.   HENT:   Head: Normocephalic and atraumatic.   Right Ear: Tympanic membrane, external ear and ear canal normal.   Left Ear: Tympanic membrane, external ear and ear canal normal.   Mouth/Throat: Uvula is midline and oropharynx is clear and moist.   Eyes: Conjunctivae are normal.   Neck: Normal range of motion. Neck supple.   Cardiovascular: Regular rhythm.   Mild tachycardia   Pulmonary/Chest: Effort normal and breath sounds normal.   Musculoskeletal:   Moves self on and off exam room table with independently with ease, gait is steady   Lymphadenopathy:     She has no cervical adenopathy.   Neurological: She is alert.   Skin: Skin is warm and dry.   Psychiatric: She has a normal mood and affect. Her behavior is normal.   Nursing note and vitals reviewed.      ED Course        Procedures    Influenza testing is deferred, patient has positive exposure with  who tested positive, no indication for testing  Assessments & Plan (with Medical Decision Making)     I have reviewed the nursing notes.    I have reviewed the findings, diagnosis, plan and need for follow up with the patient.  ASSESSMENT / PLAN:  (J10.1) Influenza A  Comment: presumed with exposure and classic symptoms  Plan:  Discussed risks, benefits, and possible side effects of Tamiflu, she would like it ordered  Cheratussin as prescribed - do not drive or participate in activities that require mental alertness while taking   Symptomatic treatments such as those listed below are recommended as indicated:  Take OTC motrin or tylenol as directed on packaging - as needed  Humidified air  May try safely elevating HOB/crib or sleeping in recliner  Stay well hydrated, rest, wash hands " often  Sinus saline nasal spray with suctioning or rinses such as a netti pot may help with sinus symptoms  Return to ED/UC if symptoms worsen or concerns develop  Patient verbally educated and given written discharge instructions       Medication List      Started    guaiFENesin-codeine 100-10 MG/5ML solution  Commonly known as:  ROBITUSSIN AC  1-2 tsp., Oral, EVERY 4 HOURS PRN     oseltamivir 75 MG capsule  Commonly known as:  TAMIFLU  75 mg, Oral, 2 TIMES DAILY            Final diagnoses:   Influenza A       2/22/2019   [facility]     Kristen Valles NP  02/23/19 7566

## 2019-02-22 NOTE — ED AVS SNAPSHOT
[facility]  [address]  [place] [address]  Phone:  [phone]                                    Mattie Edwards   MRN: [id number]    Department:  [facility]   Date of Visit:  2/22/2019           After Visit Summary Signature Page    I have received my discharge instructions, and my questions have been answered. I have discussed any challenges I see with this plan with the nurse or doctor.    ..........................................................................................................................................  Patient/Patient Representative Signature      ..........................................................................................................................................  Patient Representative Print Name and Relationship to Patient    ..................................................               ................................................  Date                                   Time    ..........................................................................................................................................  Reviewed by Signature/Title    ...................................................              ..............................................  Date                                               Time          22EPIC Rev 08/18

## 2019-03-03 NOTE — NURSING NOTE
"Chief Complaint   Patient presents with     Physical     Patient is fasting today        Initial /78 (BP Location: Left arm, Patient Position: Chair, Cuff Size: Adult Large)  Pulse 72  Temp 97.8  F (36.6  C) (Tympanic)  Ht 5' 1\" (1.549 m)  Wt 161 lb (73 kg)  LMP 05/15/2015  SpO2 98%  Breastfeeding? No  BMI 30.42 kg/m2 Estimated body mass index is 30.42 kg/(m^2) as calculated from the following:    Height as of this encounter: 5' 1\" (1.549 m).    Weight as of this encounter: 161 lb (73 kg).  Medication Reconciliation: complete   Saba Valdez CMA(AAMA)   " NEPHROLOGY PROGRESS NOTE   Sander Langford 61 y o  male MRN: [id number]  Unit/Bed#: [facility] 834-01 Encounter: [id number]      ASSESSMENT & PLAN:  1  Acute kidney injury suspected secondary to ATN in the setting of septic shock  Started on CVVH on 01/24, transitioned to IHD on 02/01  Currently on hemodialysis on a TTS schedule, last treatment yesterday  For hemodialysis on Tuesday  2  Stress cardiomyopathy in the setting of bacteremia  Resolved based last echo  3  Acute hypoxemic respiratory failure, improved, on oxygen via nasal cannula  4  GI bleeding secondary to pyloric junction ulcer status post EGD x2   Status post injection and clipping    5  Acute blood loss anemia, monitor H&H and transfusion as needed  6  MSSA bacteremia, ID on board, antibiotics as per ID     7  Access right IJ PermCath in place  Discussed with Dr Nancy Robins from Internal Medicine team    SUBJECTIVE:  Patient seen and examined, sleeping during my evaluation  No acute issues overnight reported as per nurse  Last hemodialysis treatment yesterday without any issues    OBJECTIVE:  Current Weight: Weight - Scale: 74 8 kg (164 lb 12 8 oz)  Vitals:    03/03/19 1237   BP: 111/60   Pulse:    Resp:    Temp:    SpO2:        Intake/Output Summary (Last 24 hours) at 3/3/2019 1256  Last data filed at 3/3/2019 0445  Gross per 24 hour   Intake 725 6 ml   Output 0 ml   Net 725 6 ml     General:  Obese, sleeping, in not acute distress  ENT: lips and mucous membranes moist, oxygen via nasal cannula  Neck: supple, JVD unable to be assessed given patient body habitus  Chest:  Slightly decreased breath sounds at bases, no crackles, ronchus or wheezings  CVS: distinct S1 & S2, normal rate, regular rhythm  Abdomen:  Obese, soft, non-tender, non-distended, normoactive bowel sounds  Extremities: + edema of both legs  Skin: no rash  Neuro:  Sleeping    Right IJ PermCath in place    Medications:    Current Facility-Administered Medications:    acetaminophen (TYLENOL) tablet 650 mg, 650 mg, Oral, Q6H PRN, [employer], CRNP, 650 mg at 03/01/19 5055    atorvastatin (LIPITOR) tablet 40 mg, 40 mg, Oral, Daily With Dinner, [employer], CRNP, 40 mg at 03/02/19 1830    bisacodyl (DULCOLAX) rectal suppository 10 mg, 10 mg, Rectal, Daily PRN, [employer], CRNP    ceFAZolin (ANCEF) 1 g in sodium chloride 0 9% 50 ml IVPB, 1,000 mg, Intravenous, Q24H, Kristal Bond, KENIANP, Last Rate: 100 mL/hr at 03/02/19 1839, 1,000 mg at 03/02/19 1839    diltiazem (CARDIZEM) tablet 30 mg, 30 mg, Oral, Q6H [address], Stacia Mann MD    furosemide (LASIX) injection 80 mg, 80 mg, Intravenous, BID (diuretic), Joanna Nelson MD, 80 mg at 03/03/19 5709    heparin (porcine) 25,000 units in 250 mL infusion (premix), 3-30 Units/kg/hr (Order-Specific), Intravenous, Titrated, Stacia Mann MD, Last Rate: 11 3 mL/hr at 03/03/19 0800, 9 Units/kg/hr at 03/03/19 0800    heparin (porcine) injection 2,500 Units, 2,500 Units, Intravenous, PRN, Stacia Mann MD    heparin (porcine) injection 5,000 Units, 5,000 Units, Intravenous, PRN, Stacia Mann MD    hydrocortisone 1 % cream, , Topical, BID, Kristal Bond, CRNP    melatonin tablet 6 mg, 6 mg, Oral, HS, Leidy Martinez PA-C, 6 mg at 03/02/19 2108    metoprolol (LOPRESSOR) injection 5 mg, 5 mg, Intravenous, Q6H PRN, Stacia Mann MD, 5 mg at 03/02/19 1150    metoprolol tartrate (LOPRESSOR) tablet 100 mg, 100 mg, Oral, Q12H [address], Linda Villalboos MD, 100 mg at 03/03/19 9616    nystatin (MYCOSTATIN) powder, , Topical, BID, Kristal Bond, CRNP    omeprazole (PRILOSEC) suspension 2 mg/mL, 20 mg, Oral, BID AC, Kristal Bond, CRNP, 20 mg at 03/03/19 0641    oxyCODONE (ROXICODONE) IR tablet 5 mg, 5 mg, Oral, Q4H PRN, Stacia Mann MD    oxyCODONE (ROXICODONE) IR tablet 7 5 mg, 7 5 mg, Oral, Q4H PRN, Stacia Mann MD, 7 5 mg at 03/03/19 0030    polyvinyl alcohol (LIQUIFILM TEARS) 1 4 % ophthalmic solution 1 drop, 1 drop, Both Eyes, Q3H PRN, Lendon Aase, CRNP, 1 drop at 02/13/19 1724    sucralfate (CARAFATE) oral suspension 1,000 mg, 1,000 mg, Oral, BID AC, KATHY Lugo, 1,000 mg at 03/03/19 0640    Invasive Devices:        Lab Results:   Results from last 7 days   Lab Units 03/03/19  0624 03/03/19  0129 03/02/19  1914  03/02/19  0617  03/01/19  0618   WBC Thousand/uL 12 71*  --   --   --  12 68*  --  12 78*   HEMOGLOBIN g/dL 7 4* 7 5* 7 4*   < > 7 5*   < > 8 2*   HEMATOCRIT % 24 1* 24 5* 24 3*   < > 25 1*   < > 26 8*   PLATELETS Thousands/uL 141*  --   --   --  152  --  143*   SODIUM mmol/L 135*  --   --   --  138  --  139   POTASSIUM mmol/L 3 2*  --   --   --  3 1*  --  3 2*   CHLORIDE mmol/L 102  --   --   --  104  --  106   CO2 mmol/L 29  --   --   --  27  --  29   BUN mg/dL 21  --   --   --  22  --  14   CREATININE mg/dL 2 87*  --   --   --  3 02*  --  2 19*   CALCIUM mg/dL 6 8*  --   --   --  7 0*  --  7 1*   MAGNESIUM mg/dL 1 8  --   --   --  1 9  --  2 0   PHOSPHORUS mg/dL 3 0  --   --   --  2 8  --  2 4*    < > = values in this interval not displayed  Portions of the record may have been created with voice recognition software  Occasional wrong word or "sound a like" substitutions may have occurred due to the inherent limitations of voice recognition software  Read the chart carefully and recognize, using context, where substitutions have occurred  If you have any questions, please contact the dictating provider

## 2019-05-24 DIAGNOSIS — E03.9 HYPOTHYROIDISM: ICD-10-CM

## 2019-05-24 RX ORDER — THYROID 30 MG/1
TABLET ORAL
Qty: 30 TABLET | Refills: 0 | Status: SHIPPED | OUTPATIENT
Start: 2019-05-24 | End: 2019-07-09

## 2019-06-21 ENCOUNTER — ANCILLARY PROCEDURE (OUTPATIENT)
Dept: MAMMOGRAPHY | Facility: OTHER | Age: 54
End: 2019-06-21
Attending: PHYSICIAN ASSISTANT
Payer: COMMERCIAL

## 2019-06-21 DIAGNOSIS — Z12.39 SCREENING BREAST EXAMINATION: ICD-10-CM

## 2019-06-21 PROCEDURE — 77067 SCR MAMMO BI INCL CAD: CPT | Mod: TC

## 2019-06-24 NOTE — PROGRESS NOTES
SUBJECTIVE:   CC: Mattie Edwards is an 53 year old woman who presents for preventive health visit.     Healthy Habits:    Do you get at least three servings of calcium containing foods daily (dairy, green leafy vegetables, etc.)? yes and no     Amount of exercise or daily activities, outside of work: 3 day(s) per week    Problems taking medications regularly No    Medication side effects: No    Have you had an eye exam in the past two years? yes    Do you see a dentist twice per year? yes    Do you have sleep apnea, excessive snoring or daytime drowsiness?yes  states she snores       Hypothyroidism Follow-up      Since last visit, patient describes the following symptoms: Weight stable, no hair loss, no skin changes, no constipation, no loose stools      Today's PHQ-2 Score:   PHQ-2 ( 1999 [employer]) 5/2/2018 4/28/2017   Q1: Little interest or pleasure in doing things 0 -   Q2: Feeling down, depressed or hopeless 0 -   PHQ-2 Score 0 -   Q1: Little interest or pleasure in doing things Not at all Not at all   Q2: Feeling down, depressed or hopeless Not at all Not at all   PHQ-2 Score 0 0       Abuse: Current or Past(Physical, Sexual or Emotional)- No  Do you feel safe in your environment? Yes    Social History     Tobacco Use     Smoking status: Never Smoker     Smokeless tobacco: Never Used     Tobacco comment: no passive exposure   Substance Use Topics     Alcohol use: No     If you drink alcohol do you typically have >3 drinks per day or >7 drinks per week? No                     Reviewed orders with patient.  Reviewed health maintenance and updated orders accordingly - Yes  Lab work is in process  Labs reviewed in EPIC  BP Readings from Last 3 Encounters:   06/27/19 110/70   02/22/19 130/70   07/13/18 154/67    Wt Readings from Last 3 Encounters:   06/27/19 71.4 kg (157 lb 6.4 oz)   02/22/19 68 kg (150 lb)   05/09/18 68 kg (150 lb)                  Patient Active Problem List   Diagnosis     Major  depressive disorder, single episode, mild (H)     Left Thyroid nodules     hypothyroidism     Perennial allergic rhinitis     Chronic maxillary sinusitis     Chronic ethmoidal sinusitis     Nasal turbinate hypertrophy     ACP (advance care planning)     Scleritis and episcleritis of both eyes     Meniere's disease, bilateral     Past Surgical History:   Procedure Laterality Date     NO HISTORY OF SURGERY         Social History     Tobacco Use     Smoking status: Never Smoker     Smokeless tobacco: Never Used     Tobacco comment: no passive exposure   Substance Use Topics     Alcohol use: No     Family History   Problem Relation Age of Onset     Cancer Father         brain; cause of death     Myocardial Infarction Paternal Grandfather      Myocardial Infarction Maternal Grandmother      Thyroid Cancer Cousin      Hypertension Other         family history     Sleep Apnea Other         family history     Seasonal/Environmental Allergies Other         family history     Snoring Other         family history         Current Outpatient Medications   Medication Sig Dispense Refill     ARMOUR THYROID 30 MG tablet TAKE 1 TABLET(30 MG) BY MOUTH DAILY 30 tablet 0     loratadine (CLARITIN) 10 MG tablet Take 1 tablet (10 mg) by mouth daily (Patient taking differently: Take 10 mg by mouth as needed ) 30 tablet 1     meclizine (ANTIVERT) 25 MG tablet Take 1 tablet (25 mg) by mouth 3 times daily as needed for dizziness 30 tablet 0     mometasone (NASONEX) 50 MCG/ACT nasal spray Spray 2 sprays into both nostrils daily (Patient taking differently: Spray 2 sprays into both nostrils daily as needed ) 1 Box 11     neomycin-polymyxin-dexamethasone (MAXITROL) 3.5-33064-8.1 OINT ophthalmic ointment Apply 1/2 ribbon to periocular area 3-4 times a day for one week, then use as needed for severe itching 2 Tube 1     SUMAtriptan (IMITREX) 100 MG tablet TAKE ONE TABLET BY MOUTH AS NEEDED 10 tablet 1     triamcinolone (KENALOG) 0.1 % cream Apply  sparingly to affected area three times daily as needed 80 g 0     ZOLPIDEM TARTRATE PO Take by mouth nightly as needed for sleep       No Known Allergies  Recent Labs   Lab Test 05/03/18  1102 08/08/17  1327 05/01/17  1215  05/24/16  1647  01/28/16  1046   *  --  110*  --   --   --  158*   HDL 77  --  91  --   --   --  102   TRIG 98  --  163*  --   --   --  75   ALT 24  --  27  --  25  --  46   CR 0.80  --  0.75  --  0.75  --  0.86   GFRESTIMATED 75  --  81  --  82  --  70   GFRESTBLACK >90  --  >90  African American GFR Calc    --  >90   GFR Calc    --  85   POTASSIUM 4.2 3.9 3.9  --  3.9  --  4.1   TSH 1.02  --  0.85   < > 2.21   < > 11.36*    < > = values in this interval not displayed.        Mammogram Screening: Patient over age 50, mutual decision to screen reflected in health maintenance.    Pertinent mammograms are reviewed under the imaging tab.  History of abnormal Pap smear:   Last 3 Pap and HPV Results:   PAP / HPV 1/27/2016   PAP NIL     PAP / HPV 1/27/2016   PAP NIL     Reviewed and updated as needed this visit by clinical staff         Reviewed and updated as needed this visit by Provider          Past Medical History:   Diagnosis Date     Major depressive disorder, single episode, mild (H) 9/19/2011     Thyroid disease       Past Surgical History:   Procedure Laterality Date     NO HISTORY OF SURGERY       OB History   No data available       ROS:  CONSTITUTIONAL: NEGATIVE for fever, chills, change in weight  INTEGUMENTARY/SKIN: NEGATIVE for worrisome rashes, moles or lesions  EYES: NEGATIVE for vision changes or irritation  ENT: NEGATIVE for ear, mouth and throat problems  RESP: NEGATIVE for significant cough or SOB  BREAST: NEGATIVE for masses, tenderness or discharge  CV: NEGATIVE for chest pain, palpitations or peripheral edema  GI: NEGATIVE for nausea, abdominal pain, heartburn, or change in bowel habits  : NEGATIVE for unusual urinary or vaginal symptoms. No vaginal  bleeding.  MUSCULOSKELETAL: NEGATIVE for significant arthralgias or myalgia  NEURO: NEGATIVE for weakness, dizziness or paresthesias  PSYCHIATRIC: NEGATIVE for changes in mood or affect     OBJECTIVE:   LMP 05/15/2015   EXAM:  GENERAL: healthy, alert and no distress  EYES: Eyes grossly normal to inspection, PERRL and conjunctivae and sclerae normal  HENT: ear canals and TM's normal, nose and mouth without ulcers or lesions  NECK: no adenopathy, no asymmetry, masses, or scars and thyroid normal to palpation  RESP: lungs clear to auscultation - no rales, rhonchi or wheezes  BREAST: normal without masses, tenderness or nipple discharge and no palpable axillary masses or adenopathy  CV: regular rate and rhythm, normal S1 S2, no S3 or S4, no murmur, click or rub, no peripheral edema and peripheral pulses strong  ABDOMEN: soft, nontender, no hepatosplenomegaly, no masses and bowel sounds normal   (female): normal female external genitalia, normal urethral meatus, vaginal mucosa pink, moist, well rugated, and normal cervix/adnexa/uterus without masses or discharge   (female): normal female external genitalia, normal urethral meatus , vaginal mucosa pink, moist, well rugated, normal cervix, adnexae, and uterus without masses. and pap smear is taken.   MS: no gross musculoskeletal defects noted, no edema  SKIN: no suspicious lesions or rashes  NEURO: Normal strength and tone, mentation intact and speech normal  PSYCH: mentation appears normal, affect normal/bright    Diagnostic Test Results:  Labs reviewed in Epic  No results found for this or any previous visit (from the past 24 hour(s)).  Results for orders placed or performed in visit on 06/27/19   A pap thin layer screen with  HPV - recommended age 30 - 65 years (select HPV order below)   Result Value Ref Range    PAP NIL     Copath Report         Patient Name: LETY KHALIL  MR#: [id number]  Specimen #: [id number]  Collected: 6/27/2019  Received:  7/2/2019  Reported: 7/8/2019 10:31  Ordering Phy(s): KYLE SALAZAR    For improved result formatting, select 'View Enhanced Report Format' under   Linked Documents section.    SPECIMEN/STAIN PROCESS:  Pap thin layer prep screening ([facility])       Pap-Cyto x 1, HPV ordered x 1    SOURCE: Cervical, endocervical  ----------------------------------------------------------------   Pap thin layer prep screening ([facility])  SPECIMEN ADEQUACY:  Satisfactory for evaluation.  -Transitional zone component could not be determined due to atrophy.    CYTOLOGIC INTERPRETATION:    Negative for intraepithelial lesion or malignancy    Electronically signed out by:  DANIELITO Salgado ( ASCP)    CLINICAL HISTORY:  LMP: 05/15/2015  A previous normal pap  Date of Last Pap: 01/27/2016,    Papanicolaou Test Limitations:  Cervical cytology is a screening test with   limited sensitivity; regular  scree spenser is critical for cancer prevention; Pap tests are primarily   effective for the diagnosis/prevention of  squamous cell carcinoma, not adenocarcinomas or other cancers.    COLLECTION SITE:  Client:  [facility]  Location: [facility] ([facility])    The technical component of this testing was completed at [facility], with the professional  component performed at [facility], [address] [place] [address] ([phone])       HPV High Risk Types DNA Cervical   Result Value Ref Range    HPV Source [facility]     HPV 16 DNA Negative NEG^Negative    HPV 18 DNA Negative NEG^Negative    Other HR HPV Negative NEG^Negative    Final Diagnosis This patient's sample is negative for HPV DNA.     Specimen Description Cervical Cells    TSH with free T4 reflex   Result Value Ref Range    TSH 1.25 0.40 - 4.00 mU/L       ASSESSMENT/PLAN:   1. Routine general medical examination at a health care facility  She is due for a pap smear and is doing well. Exam is negative up  dated on vaccine. Weight is up a  "little bit but we are working on that. Exercise is better. See us back as recommended. Recommended health care directive.  No further dizziness.   - A pap thin layer screen with  HPV - recommended age 30 - 65 years (select HPV order below)  - HPV High Risk Types DNA Cervical  - Lipid Profile; Future  - Comprehensive metabolic panel; Future  - CBC with platelets differential; Future  - UA with Microscopic reflex to Culture; Future    2. Acquired hypothyroidism  Recheck and refill.   - TSH with free T4 reflex    3. Intractable episodic paroxysmal hemicrania  Given refill . Working well fo rher .  - SUMAtriptan (IMITREX) 100 MG tablet; TAKE ONE TABLET BY MOUTH AS NEEDED  Dispense: 10 tablet; Refill: 1    4. Need for vaccination  Given today.   - TD PRSERV FREE >=7 YRS ADS IM [51958]  - 1st  Administration  [13678]    COUNSELING:   Reviewed preventive health counseling, as reflected in patient instructions       Regular exercise       Healthy diet/nutrition       Vision screening       Hearing screening       Immunizations    Vaccinated for: TDAP             Advance Care Planning    Estimated body mass index is 28.34 kg/m  as calculated from the following:    Height as of 2/22/19: 1.549 m (5' 1\").    Weight as of 2/22/19: 68 kg (150 lb).    Weight management plan: Discussed healthy diet and exercise guidelines     reports that she has never smoked. She has never used smokeless tobacco.      Counseling Resources:  ATP IV Guidelines  Pooled Cohorts Equation Calculator  Breast Cancer Risk Calculator  FRAX Risk Assessment  ICSI Preventive Guidelines  Dietary Guidelines for Americans, 2010  USDA's MyPlate  ASA Prophylaxis  Lung CA Screening    MUKESH Leon  [facility] [place]  "

## 2019-06-27 ENCOUNTER — OFFICE VISIT (OUTPATIENT)
Dept: FAMILY MEDICINE | Facility: OTHER | Age: 54
End: 2019-06-27
Attending: PHYSICIAN ASSISTANT
Payer: COMMERCIAL

## 2019-06-27 VITALS
DIASTOLIC BLOOD PRESSURE: 70 MMHG | TEMPERATURE: 98.3 F | BODY MASS INDEX: 29.72 KG/M2 | HEART RATE: 67 BPM | SYSTOLIC BLOOD PRESSURE: 110 MMHG | HEIGHT: 61 IN | OXYGEN SATURATION: 98 % | WEIGHT: 157.4 LBS

## 2019-06-27 DIAGNOSIS — Z00.00 ROUTINE GENERAL MEDICAL EXAMINATION AT A HEALTH CARE FACILITY: Primary | ICD-10-CM

## 2019-06-27 DIAGNOSIS — G44.031 INTRACTABLE EPISODIC PAROXYSMAL HEMICRANIA: ICD-10-CM

## 2019-06-27 DIAGNOSIS — E03.9 ACQUIRED HYPOTHYROIDISM: ICD-10-CM

## 2019-06-27 DIAGNOSIS — Z23 NEED FOR VACCINATION: ICD-10-CM

## 2019-06-27 LAB — TSH SERPL DL<=0.005 MIU/L-ACNC: 1.25 MU/L (ref 0.4–4)

## 2019-06-27 PROCEDURE — 99000 SPECIMEN HANDLING OFFICE-LAB: CPT | Performed by: PHYSICIAN ASSISTANT

## 2019-06-27 PROCEDURE — 36415 COLL VENOUS BLD VENIPUNCTURE: CPT | Performed by: PHYSICIAN ASSISTANT

## 2019-06-27 PROCEDURE — 84443 ASSAY THYROID STIM HORMONE: CPT | Performed by: PHYSICIAN ASSISTANT

## 2019-06-27 PROCEDURE — 90471 IMMUNIZATION ADMIN: CPT | Performed by: PHYSICIAN ASSISTANT

## 2019-06-27 PROCEDURE — 99396 PREV VISIT EST AGE 40-64: CPT | Mod: 25 | Performed by: PHYSICIAN ASSISTANT

## 2019-06-27 PROCEDURE — 90714 TD VACC NO PRESV 7 YRS+ IM: CPT | Performed by: PHYSICIAN ASSISTANT

## 2019-06-27 PROCEDURE — 87624 HPV HI-RISK TYP POOLED RSLT: CPT | Mod: 90 | Performed by: PHYSICIAN ASSISTANT

## 2019-06-27 PROCEDURE — G0123 SCREEN CERV/VAG THIN LAYER: HCPCS | Performed by: PHYSICIAN ASSISTANT

## 2019-06-27 RX ORDER — SUMATRIPTAN 100 MG/1
TABLET, FILM COATED ORAL
Qty: 10 TABLET | Refills: 1 | Status: SHIPPED | OUTPATIENT
Start: 2019-06-27 | End: 2023-02-16

## 2019-06-27 ASSESSMENT — ANXIETY QUESTIONNAIRES
3. WORRYING TOO MUCH ABOUT DIFFERENT THINGS: SEVERAL DAYS
7. FEELING AFRAID AS IF SOMETHING AWFUL MIGHT HAPPEN: NOT AT ALL
IF YOU CHECKED OFF ANY PROBLEMS ON THIS QUESTIONNAIRE, HOW DIFFICULT HAVE THESE PROBLEMS MADE IT FOR YOU TO DO YOUR WORK, TAKE CARE OF THINGS AT HOME, OR GET ALONG WITH OTHER PEOPLE: NOT DIFFICULT AT ALL
GAD7 TOTAL SCORE: 4
2. NOT BEING ABLE TO STOP OR CONTROL WORRYING: SEVERAL DAYS
5. BEING SO RESTLESS THAT IT IS HARD TO SIT STILL: NOT AT ALL
1. FEELING NERVOUS, ANXIOUS, OR ON EDGE: SEVERAL DAYS
6. BECOMING EASILY ANNOYED OR IRRITABLE: SEVERAL DAYS

## 2019-06-27 ASSESSMENT — MIFFLIN-ST. JEOR: SCORE: 1256.34

## 2019-06-27 ASSESSMENT — PATIENT HEALTH QUESTIONNAIRE - PHQ9
5. POOR APPETITE OR OVEREATING: NOT AT ALL
SUM OF ALL RESPONSES TO PHQ QUESTIONS 1-9: 4

## 2019-06-27 ASSESSMENT — PAIN SCALES - GENERAL: PAINLEVEL: NO PAIN (0)

## 2019-06-27 NOTE — NURSING NOTE
"Chief Complaint   Patient presents with     Physical       Initial /70 (BP Location: Right arm, Patient Position: Chair, Cuff Size: Adult Regular)   Pulse 67   Temp 98.3  F (36.8  C) (Tympanic)   Ht 1.549 m (5' 1\")   Wt 71.4 kg (157 lb 6.4 oz)   LMP 05/15/2015   SpO2 98%   BMI 29.74 kg/m   Estimated body mass index is 29.74 kg/m  as calculated from the following:    Height as of this encounter: 1.549 m (5' 1\").    Weight as of this encounter: 71.4 kg (157 lb 6.4 oz).  Medication Reconciliation: complete  "

## 2019-06-28 ASSESSMENT — ANXIETY QUESTIONNAIRES: GAD7 TOTAL SCORE: 4

## 2019-07-08 LAB
COPATH REPORT: NORMAL
PAP: NORMAL

## 2019-07-09 DIAGNOSIS — E03.9 HYPOTHYROIDISM: ICD-10-CM

## 2019-07-09 LAB
FINAL DIAGNOSIS: NORMAL
HPV HR 12 DNA CVX QL NAA+PROBE: NEGATIVE
HPV16 DNA SPEC QL NAA+PROBE: NEGATIVE
HPV18 DNA SPEC QL NAA+PROBE: NEGATIVE
SPECIMEN DESCRIPTION: NORMAL
SPECIMEN SOURCE CVX/VAG CYTO: NORMAL

## 2019-07-09 RX ORDER — THYROID 30 MG/1
30 TABLET ORAL DAILY
Qty: 30 TABLET | Refills: 10 | Status: SHIPPED | OUTPATIENT
Start: 2019-07-09 | End: 2020-06-02

## 2019-07-09 NOTE — TELEPHONE ENCOUNTER
Do  Last visit date with prescribing provider: 6-  Last refill date: 5-  Quantity: 30, Refills: 0    Pari Grant

## 2019-07-10 DIAGNOSIS — Z00.00 ROUTINE GENERAL MEDICAL EXAMINATION AT A HEALTH CARE FACILITY: ICD-10-CM

## 2019-07-10 LAB
ALBUMIN SERPL-MCNC: 3.7 G/DL (ref 3.4–5)
ALBUMIN UR-MCNC: NEGATIVE MG/DL
ALP SERPL-CCNC: 81 U/L (ref 40–150)
ALT SERPL W P-5'-P-CCNC: 24 U/L (ref 0–50)
ANION GAP SERPL CALCULATED.3IONS-SCNC: 2 MMOL/L (ref 3–14)
APPEARANCE UR: CLEAR
AST SERPL W P-5'-P-CCNC: 12 U/L (ref 0–45)
BACTERIA #/AREA URNS HPF: ABNORMAL /HPF
BASOPHILS # BLD AUTO: 0.1 10E9/L (ref 0–0.2)
BASOPHILS NFR BLD AUTO: 1 %
BILIRUB SERPL-MCNC: 0.4 MG/DL (ref 0.2–1.3)
BILIRUB UR QL STRIP: NEGATIVE
BUN SERPL-MCNC: 14 MG/DL (ref 7–30)
CALCIUM SERPL-MCNC: 9.2 MG/DL (ref 8.5–10.1)
CHLORIDE SERPL-SCNC: 106 MMOL/L (ref 94–109)
CHOLEST SERPL-MCNC: 237 MG/DL
CO2 SERPL-SCNC: 32 MMOL/L (ref 20–32)
COLOR UR AUTO: ABNORMAL
CREAT SERPL-MCNC: 0.9 MG/DL (ref 0.52–1.04)
DIFFERENTIAL METHOD BLD: NORMAL
EOSINOPHIL # BLD AUTO: 0.4 10E9/L (ref 0–0.7)
EOSINOPHIL NFR BLD AUTO: 5.9 %
ERYTHROCYTE [DISTWIDTH] IN BLOOD BY AUTOMATED COUNT: 12.9 % (ref 10–15)
GFR SERPL CREATININE-BSD FRML MDRD: 72 ML/MIN/{1.73_M2}
GLUCOSE SERPL-MCNC: 90 MG/DL (ref 70–99)
GLUCOSE UR STRIP-MCNC: NEGATIVE MG/DL
HCT VFR BLD AUTO: 40.7 % (ref 35–47)
HDLC SERPL-MCNC: 75 MG/DL
HGB BLD-MCNC: 13.7 G/DL (ref 11.7–15.7)
HGB UR QL STRIP: NEGATIVE
IMM GRANULOCYTES # BLD: 0 10E9/L (ref 0–0.4)
IMM GRANULOCYTES NFR BLD: 0.2 %
KETONES UR STRIP-MCNC: NEGATIVE MG/DL
LDLC SERPL CALC-MCNC: 135 MG/DL
LEUKOCYTE ESTERASE UR QL STRIP: NEGATIVE
LYMPHOCYTES # BLD AUTO: 2.6 10E9/L (ref 0.8–5.3)
LYMPHOCYTES NFR BLD AUTO: 41.8 %
MCH RBC QN AUTO: 28.9 PG (ref 26.5–33)
MCHC RBC AUTO-ENTMCNC: 33.7 G/DL (ref 31.5–36.5)
MCV RBC AUTO: 86 FL (ref 78–100)
MONOCYTES # BLD AUTO: 0.7 10E9/L (ref 0–1.3)
MONOCYTES NFR BLD AUTO: 11.3 %
MUCOUS THREADS #/AREA URNS LPF: PRESENT /LPF
NEUTROPHILS # BLD AUTO: 2.4 10E9/L (ref 1.6–8.3)
NEUTROPHILS NFR BLD AUTO: 39.8 %
NITRATE UR QL: NEGATIVE
NONHDLC SERPL-MCNC: 162 MG/DL
NRBC # BLD AUTO: 0 10*3/UL
NRBC BLD AUTO-RTO: 0 /100
PH UR STRIP: 6 PH (ref 4.7–8)
PLATELET # BLD AUTO: 274 10E9/L (ref 150–450)
POTASSIUM SERPL-SCNC: 3.8 MMOL/L (ref 3.4–5.3)
PROT SERPL-MCNC: 7.6 G/DL (ref 6.8–8.8)
RBC # BLD AUTO: 4.74 10E12/L (ref 3.8–5.2)
RBC #/AREA URNS AUTO: <1 /HPF (ref 0–2)
SODIUM SERPL-SCNC: 140 MMOL/L (ref 133–144)
SOURCE: ABNORMAL
SP GR UR STRIP: 1.02 (ref 1–1.03)
TRIGL SERPL-MCNC: 134 MG/DL
UROBILINOGEN UR STRIP-MCNC: NORMAL MG/DL (ref 0–2)
WBC # BLD AUTO: 6.1 10E9/L (ref 4–11)
WBC #/AREA URNS AUTO: 1 /HPF (ref 0–5)

## 2019-07-10 PROCEDURE — 81001 URINALYSIS AUTO W/SCOPE: CPT | Performed by: PHYSICIAN ASSISTANT

## 2019-07-10 PROCEDURE — 80061 LIPID PANEL: CPT | Performed by: PHYSICIAN ASSISTANT

## 2019-07-10 PROCEDURE — 36415 COLL VENOUS BLD VENIPUNCTURE: CPT | Performed by: PHYSICIAN ASSISTANT

## 2019-07-10 PROCEDURE — 80053 COMPREHEN METABOLIC PANEL: CPT | Performed by: PHYSICIAN ASSISTANT

## 2019-07-10 PROCEDURE — 85025 COMPLETE CBC W/AUTO DIFF WBC: CPT | Performed by: PHYSICIAN ASSISTANT

## 2019-07-14 ENCOUNTER — TRANSFERRED RECORDS (OUTPATIENT)
Dept: HEALTH INFORMATION MANAGEMENT | Facility: CLINIC | Age: 54
End: 2019-07-14

## 2019-07-14 LAB — COLOGUARD-ABSTRACT: NEGATIVE

## 2019-08-01 ENCOUNTER — TELEPHONE (OUTPATIENT)
Dept: FAMILY MEDICINE | Facility: OTHER | Age: 54
End: 2019-08-01

## 2020-06-02 DIAGNOSIS — E03.9 HYPOTHYROIDISM: ICD-10-CM

## 2020-06-02 RX ORDER — THYROID 30 MG/1
30 TABLET ORAL DAILY
Qty: 30 TABLET | Refills: 0 | Status: SHIPPED | OUTPATIENT
Start: 2020-06-02 | End: 2020-07-08

## 2020-06-02 NOTE — TELEPHONE ENCOUNTER
thyroid (RUDY THYROID) 30 MG tablet       Last Written Prescription Date:  7/9/19  Last Fill Quantity: 30,   # refills: 10  Last Office Visit: 6/27/19  Future Office visit:       Routing refill request to provider for review/approval because:  Drug not on the [facility], [facility] or [facility] refill protocol or controlled substance

## 2020-07-08 DIAGNOSIS — E03.9 HYPOTHYROIDISM: ICD-10-CM

## 2020-07-08 DIAGNOSIS — E04.1 THYROID NODULE: ICD-10-CM

## 2020-07-08 DIAGNOSIS — Z00.00 ROUTINE GENERAL MEDICAL EXAMINATION AT A HEALTH CARE FACILITY: Primary | ICD-10-CM

## 2020-07-08 RX ORDER — THYROID 30 MG/1
30 TABLET ORAL DAILY
Qty: 30 TABLET | Refills: 0 | Status: SHIPPED | OUTPATIENT
Start: 2020-07-08 | End: 2020-08-11

## 2020-07-08 NOTE — TELEPHONE ENCOUNTER
Due for office visit and labs. Labs ordered. Letter sent. Please advise. Thank you!      TSH   Date Value Ref Range Status   06/27/2019 1.25 0.40 - 4.00 mU/L Final

## 2020-07-08 NOTE — TELEPHONE ENCOUNTER
Thyroid Regan 30 mg      Last Written Prescription Date:  6-2-2020  Last Fill Quantity: 30,   # refills: 0  Last Office Visit: 6-  Future Office visit:  None scheduled     Routing refill request to provider for review/approval because:

## 2020-07-08 NOTE — LETTER
July 8, 2020      Mattie Edwards  [address] [place] [address]        Dear Mattie,     APPOINTMENT REMINDER:   Our records indicates that it is time for you to be seen for a yearly office visit and labs.     Your current medication request will be approved for one refill but you will need to be seen before any additional refills can be approved.  Taking care of your health is important to us, and ongoing visits with your provider are vital to your care.    We look forward to seeing you in the near future.  You may call our office at [phone] to schedule a visit.     Please disregard this notice if you have already made an appointment.        Sincerely,    MUKESH Leon

## 2020-07-13 ENCOUNTER — TELEPHONE (OUTPATIENT)
Dept: FAMILY MEDICINE | Facility: OTHER | Age: 55
End: 2020-07-13
Payer: COMMERCIAL

## 2020-07-13 NOTE — TELEPHONE ENCOUNTER
To: Nurse to Tracey Leary  Patient would like to schedule a physical.  My search took me into October.  She will be out of medication before then, I explained that in some cases the provider will fill up to the appt date of the physical and she was not happy with hearing that.  She would like to speak with a nurse about this.  Her phone is [phone].  Thank you

## 2020-07-17 DIAGNOSIS — E04.1 THYROID NODULE: ICD-10-CM

## 2020-07-29 NOTE — PROGRESS NOTES
SUBJECTIVE:   CC: Mattie Edwards is an 55 year old woman who presents for preventive health visit.     Healthy Habits:    Do you get at least three servings of calcium containing foods daily (dairy, green leafy vegetables, etc.)? yes    Amount of exercise or daily activities, outside of work:     Problems taking medications regularly No    Medication side effects: No    Have you had an eye exam in the past two years? yes    Do you see a dentist twice per year? yes    Do you have sleep apnea, excessive snoring or daytime drowsiness?yes        Hypothyroidism Follow-up      Since last visit, patient describes the following symptoms: weight gain of 10 lbs      Today's PHQ-2 Score:   PHQ-2 ( 1999 [employer]) 5/2/2018 4/28/2017   Q1: Little interest or pleasure in doing things 0 -   Q2: Feeling down, depressed or hopeless 0 -   PHQ-2 Score 0 -   Q1: Little interest or pleasure in doing things Not at all Not at all   Q2: Feeling down, depressed or hopeless Not at all Not at all   PHQ-2 Score 0 0       Abuse: Current or Past(Physical, Sexual or Emotional)- No  Do you feel safe in your environment? Yes        Social History     Tobacco Use     Smoking status: Never Smoker     Smokeless tobacco: Never Used     Tobacco comment: no passive exposure   Substance Use Topics     Alcohol use: No     If you drink alcohol do you typically have >3 drinks per day or >7 drinks per week? No                     Reviewed orders with patient.  Reviewed health maintenance and updated orders accordingly - Yes  Lab work is in process  Labs reviewed in EPIC  BP Readings from Last 3 Encounters:   07/30/20 122/70   06/27/19 110/70   02/22/19 130/70    Wt Readings from Last 3 Encounters:   07/30/20 72.6 kg (160 lb)   06/27/19 71.4 kg (157 lb 6.4 oz)   02/22/19 68 kg (150 lb)                  Patient Active Problem List   Diagnosis     Major depressive disorder, single episode, mild (H)     Left Thyroid nodules     hypothyroidism     Perennial  allergic rhinitis     Chronic maxillary sinusitis     Chronic ethmoidal sinusitis     Nasal turbinate hypertrophy     ACP (advance care planning)     Scleritis and episcleritis of both eyes     Meniere's disease, bilateral     Past Surgical History:   Procedure Laterality Date     NO HISTORY OF SURGERY         Social History     Tobacco Use     Smoking status: Never Smoker     Smokeless tobacco: Never Used     Tobacco comment: no passive exposure   Substance Use Topics     Alcohol use: No     Family History   Problem Relation Age of Onset     Cancer Father         brain; cause of death     Myocardial Infarction Paternal Grandfather      Myocardial Infarction Maternal Grandmother      Thyroid Cancer Cousin      Hypertension Other         family history     Sleep Apnea Other         family history     Seasonal/Environmental Allergies Other         family history     Snoring Other         family history         Current Outpatient Medications   Medication Sig Dispense Refill     cetirizine (ZYRTEC) 10 MG tablet Take 10 mg by mouth daily       meclizine (ANTIVERT) 25 MG tablet Take 1 tablet (25 mg) by mouth 3 times daily as needed for dizziness 30 tablet 0     SUMAtriptan (IMITREX) 100 MG tablet TAKE ONE TABLET BY MOUTH AS NEEDED 10 tablet 1     thyroid (ARMOUR THYROID) 30 MG tablet Take 1 tablet (30 mg) by mouth daily 30 tablet 0     No Known Allergies  Recent Labs   Lab Test 07/10/19  0812 06/27/19  1416 05/03/18  1102  05/01/17  1215   *  --  143*  --  110*   HDL 75  --  77  --  91   TRIG 134  --  98  --  163*   ALT 24  --  24  --  27   CR 0.90  --  0.80  --  0.75   GFRESTIMATED 72  --  75  --  81   GFRESTBLACK 84  --  >90  --  >90  African American GFR Calc     POTASSIUM 3.8  --  4.2   < > 3.9   TSH  --  1.25 1.02  --  0.85    < > = values in this interval not displayed.        Mammogram Screening: Patient over age 50, mutual decision to screen reflected in health maintenance.    Pertinent mammograms are  "reviewed under the imaging tab.  History of abnormal Pap smear:   Last 3 Pap Results:   PAP (no units)   Date Value   06/27/2019 NIL   01/27/2016 NIL     PAP / HPV Latest Ref Rng & Units 6/27/2019 1/27/2016   PAP - NIL NIL   HPV 16 DNA NEG:Negative Negative -   HPV 18 DNA NEG:Negative Negative -   OTHER HR HPV NEG:Negative Negative -     Reviewed and updated as needed this visit by clinical staff  Tobacco  Allergies  Meds  Med Hx  Surg Hx  Fam Hx  Soc Hx        Reviewed and updated as needed this visit by Provider          Past Medical History:   Diagnosis Date     Major depressive disorder, single episode, mild (H) 9/19/2011     Thyroid disease       Past Surgical History:   Procedure Laterality Date     NO HISTORY OF SURGERY       OB History   No obstetric history on file.       ROS:  CONSTITUTIONAL: NEGATIVE for fever, chills, change in weight  INTEGUMENTARY/SKIN: NEGATIVE for worrisome rashes, moles or lesions  EYES: NEGATIVE for vision changes or irritation  ENT: NEGATIVE for ear, mouth and throat problems  RESP: NEGATIVE for significant cough or SOB  BREAST: NEGATIVE for masses, tenderness or discharge  CV: NEGATIVE for chest pain, palpitations or peripheral edema  GI: NEGATIVE for nausea, abdominal pain, heartburn, or change in bowel habits  : NEGATIVE for unusual urinary or vaginal symptoms. No vaginal bleeding.  MUSCULOSKELETAL: NEGATIVE for significant arthralgias or myalgia  NEURO: NEGATIVE for weakness, dizziness or paresthesias  ENDOCRINE: NEGATIVE for temperature intolerance, skin/hair changes  HEME/ALLERGY/IMMUNE: NEGATIVE for bleeding problems  PSYCHIATRIC: NEGATIVE for changes in mood or affect     OBJECTIVE:   /70   Pulse 73   Temp 97.2  F (36.2  C) (Tympanic)   Resp 20   Ht 1.632 m (5' 4.25\")   Wt 72.6 kg (160 lb)   LMP 05/15/2015   SpO2 97%   BMI 27.25 kg/m    EXAM:  GENERAL APPEARANCE: healthy, alert and no distress  EYES: Eyes grossly normal to inspection, PERRL and " conjunctivae and sclerae normal  HENT: ear canals and TM's normal, nose and mouth without ulcers or lesions, oropharynx clear and oral mucous membranes moist  NECK: no adenopathy, no asymmetry, masses, or scars and thyroid normal to palpation  RESP: lungs clear to auscultation - no rales, rhonchi or wheezes  BREAST: normal without masses, tenderness or nipple discharge and no palpable axillary masses or adenopathy  CV: regular rate and rhythm, normal S1 S2, no S3 or S4, no murmur, click or rub, no peripheral edema and peripheral pulses strong  ABDOMEN: soft, nontender, no hepatosplenomegaly, no masses and bowel sounds normal  MS: no musculoskeletal defects are noted and gait is age appropriate without ataxia  SKIN: large patches of her right arm of eczema that is thickened.   NEURO: Normal strength and tone, sensory exam grossly normal, mentation intact and speech normal  PSYCH: mentation appears normal and affect normal/bright    Diagnostic Test Results:  Labs reviewed in Epic  Results for orders placed or performed in visit on 07/30/20   TSH with free T4 reflex     Status: None   Result Value Ref Range    TSH 1.87 0.40 - 4.00 mU/L   CBC with platelets differential     Status: None   Result Value Ref Range    WBC 6.3 4.0 - 11.0 10e9/L    RBC Count 4.91 3.8 - 5.2 10e12/L    Hemoglobin 14.2 11.7 - 15.7 g/dL    Hematocrit 42.3 35.0 - 47.0 %    MCV 86 78 - 100 fl    MCH 28.9 26.5 - 33.0 pg    MCHC 33.6 31.5 - 36.5 g/dL    RDW 13.2 10.0 - 15.0 %    Platelet Count 268 150 - 450 10e9/L    Diff Method Automated Method     % Neutrophils 51.4 %    % Lymphocytes 30.4 %    % Monocytes 10.4 %    % Eosinophils 6.8 %    % Basophils 0.8 %    % Immature Granulocytes 0.2 %    Nucleated RBCs 0 0 /100    Absolute Neutrophil 3.3 1.6 - 8.3 10e9/L    Absolute Lymphocytes 1.9 0.8 - 5.3 10e9/L    Absolute Monocytes 0.7 0.0 - 1.3 10e9/L    Absolute Eosinophils 0.4 0.0 - 0.7 10e9/L    Absolute Basophils 0.1 0.0 - 0.2 10e9/L    Abs Immature  Granulocytes 0.0 0 - 0.4 10e9/L    Absolute Nucleated RBC 0.0    Lipid Profile     Status: Abnormal   Result Value Ref Range    Cholesterol 236 (H) <200 mg/dL    Triglycerides 128 <150 mg/dL    HDL Cholesterol 73 >49 mg/dL    LDL Cholesterol Calculated 137 (H) <100 mg/dL    Non HDL Cholesterol 163 (H) <130 mg/dL   Comprehensive metabolic panel     Status: Abnormal   Result Value Ref Range    Sodium 138 133 - 144 mmol/L    Potassium 4.2 3.4 - 5.3 mmol/L    Chloride 105 94 - 109 mmol/L    Carbon Dioxide 31 20 - 32 mmol/L    Anion Gap 2 (L) 3 - 14 mmol/L    Glucose 99 70 - 99 mg/dL    Urea Nitrogen 14 7 - 30 mg/dL    Creatinine 0.84 0.52 - 1.04 mg/dL    GFR Estimate 79 >60 mL/min/[1.73_m2]    GFR Estimate If Black >90 >60 mL/min/[1.73_m2]    Calcium 9.2 8.5 - 10.1 mg/dL    Bilirubin Total 0.4 0.2 - 1.3 mg/dL    Albumin 3.9 3.4 - 5.0 g/dL    Protein Total 8.3 6.8 - 8.8 g/dL    Alkaline Phosphatase 91 40 - 150 U/L    ALT 27 0 - 50 U/L    AST 12 0 - 45 U/L       ASSESSMENT/PLAN:   1. Well woman exam  She is feeling well. working from home isolation is ok. Glad to see us in clinic. Feeling relatively safe outside her home. Discussion on mask wearing. Risk prevention. She is having flair of her eczema. Needing refills. Due for mammogram. Exam is negative. Needing refill of her medications.   - CBC with platelets differential  - Lipid Profile  - Comprehensive metabolic panel    2. Encounter for screening mammogram for breast cancer  due  - [facility] SCREENING DIGITAL BILATERAL ([place]); Future    3. Acquired hypothyroidism  Due feeling ok. Refill if in range.   - TSH with free T4 reflex    4. Flexural eczema  Refill. See derm not healing and can't seem to get on top with all the cortisone.   - cetirizine (ZYRTEC) 10 MG tablet; Take 10 mg by mouth daily  - DERMATOLOGY REFERRAL    5. Need for shingles vaccine  Given today.   - ZOSTER VACCINE RECOMBINANT ADJUVANTED IM NJX      COUNSELING:   Reviewed preventive health counseling, as  "reflected in patient instructions       Regular exercise       Healthy diet/nutrition       Immunizations    Vaccinated for: Zoster             Advance Care Planning    Estimated body mass index is 27.25 kg/m  as calculated from the following:    Height as of this encounter: 1.632 m (5' 4.25\").    Weight as of this encounter: 72.6 kg (160 lb).         reports that she has never smoked. She has never used smokeless tobacco.      Counseling Resources:  ATP IV Guidelines  Pooled Cohorts Equation Calculator  Breast Cancer Risk Calculator  FRAX Risk Assessment  ICSI Preventive Guidelines  Dietary Guidelines for Americans, 2010  USDA's MyPlate  ASA Prophylaxis  Lung CA Screening    MUKESH Leon  [facility] [place]  "

## 2020-07-30 ENCOUNTER — OFFICE VISIT (OUTPATIENT)
Dept: FAMILY MEDICINE | Facility: OTHER | Age: 55
End: 2020-07-30
Attending: PHYSICIAN ASSISTANT
Payer: COMMERCIAL

## 2020-07-30 VITALS
TEMPERATURE: 97.2 F | WEIGHT: 160 LBS | OXYGEN SATURATION: 97 % | BODY MASS INDEX: 27.31 KG/M2 | SYSTOLIC BLOOD PRESSURE: 122 MMHG | DIASTOLIC BLOOD PRESSURE: 70 MMHG | HEIGHT: 64 IN | HEART RATE: 73 BPM | RESPIRATION RATE: 20 BRPM

## 2020-07-30 DIAGNOSIS — Z12.31 ENCOUNTER FOR SCREENING MAMMOGRAM FOR BREAST CANCER: ICD-10-CM

## 2020-07-30 DIAGNOSIS — E03.9 ACQUIRED HYPOTHYROIDISM: ICD-10-CM

## 2020-07-30 DIAGNOSIS — L20.82 FLEXURAL ECZEMA: ICD-10-CM

## 2020-07-30 DIAGNOSIS — Z01.419 WELL WOMAN EXAM: Primary | ICD-10-CM

## 2020-07-30 DIAGNOSIS — Z23 NEED FOR SHINGLES VACCINE: ICD-10-CM

## 2020-07-30 LAB
ALBUMIN SERPL-MCNC: 3.9 G/DL (ref 3.4–5)
ALP SERPL-CCNC: 91 U/L (ref 40–150)
ALT SERPL W P-5'-P-CCNC: 27 U/L (ref 0–50)
ANION GAP SERPL CALCULATED.3IONS-SCNC: 2 MMOL/L (ref 3–14)
AST SERPL W P-5'-P-CCNC: 12 U/L (ref 0–45)
BASOPHILS # BLD AUTO: 0.1 10E9/L (ref 0–0.2)
BASOPHILS NFR BLD AUTO: 0.8 %
BILIRUB SERPL-MCNC: 0.4 MG/DL (ref 0.2–1.3)
BUN SERPL-MCNC: 14 MG/DL (ref 7–30)
CALCIUM SERPL-MCNC: 9.2 MG/DL (ref 8.5–10.1)
CHLORIDE SERPL-SCNC: 105 MMOL/L (ref 94–109)
CHOLEST SERPL-MCNC: 236 MG/DL
CO2 SERPL-SCNC: 31 MMOL/L (ref 20–32)
CREAT SERPL-MCNC: 0.84 MG/DL (ref 0.52–1.04)
DIFFERENTIAL METHOD BLD: NORMAL
EOSINOPHIL # BLD AUTO: 0.4 10E9/L (ref 0–0.7)
EOSINOPHIL NFR BLD AUTO: 6.8 %
ERYTHROCYTE [DISTWIDTH] IN BLOOD BY AUTOMATED COUNT: 13.2 % (ref 10–15)
GFR SERPL CREATININE-BSD FRML MDRD: 79 ML/MIN/{1.73_M2}
GLUCOSE SERPL-MCNC: 99 MG/DL (ref 70–99)
HCT VFR BLD AUTO: 42.3 % (ref 35–47)
HDLC SERPL-MCNC: 73 MG/DL
HGB BLD-MCNC: 14.2 G/DL (ref 11.7–15.7)
IMM GRANULOCYTES # BLD: 0 10E9/L (ref 0–0.4)
IMM GRANULOCYTES NFR BLD: 0.2 %
LDLC SERPL CALC-MCNC: 137 MG/DL
LYMPHOCYTES # BLD AUTO: 1.9 10E9/L (ref 0.8–5.3)
LYMPHOCYTES NFR BLD AUTO: 30.4 %
MCH RBC QN AUTO: 28.9 PG (ref 26.5–33)
MCHC RBC AUTO-ENTMCNC: 33.6 G/DL (ref 31.5–36.5)
MCV RBC AUTO: 86 FL (ref 78–100)
MONOCYTES # BLD AUTO: 0.7 10E9/L (ref 0–1.3)
MONOCYTES NFR BLD AUTO: 10.4 %
NEUTROPHILS # BLD AUTO: 3.3 10E9/L (ref 1.6–8.3)
NEUTROPHILS NFR BLD AUTO: 51.4 %
NONHDLC SERPL-MCNC: 163 MG/DL
NRBC # BLD AUTO: 0 10*3/UL
NRBC BLD AUTO-RTO: 0 /100
PLATELET # BLD AUTO: 268 10E9/L (ref 150–450)
POTASSIUM SERPL-SCNC: 4.2 MMOL/L (ref 3.4–5.3)
PROT SERPL-MCNC: 8.3 G/DL (ref 6.8–8.8)
RBC # BLD AUTO: 4.91 10E12/L (ref 3.8–5.2)
SODIUM SERPL-SCNC: 138 MMOL/L (ref 133–144)
TRIGL SERPL-MCNC: 128 MG/DL
TSH SERPL DL<=0.005 MIU/L-ACNC: 1.87 MU/L (ref 0.4–4)
WBC # BLD AUTO: 6.3 10E9/L (ref 4–11)

## 2020-07-30 PROCEDURE — 80050 GENERAL HEALTH PANEL: CPT | Performed by: PHYSICIAN ASSISTANT

## 2020-07-30 PROCEDURE — 99396 PREV VISIT EST AGE 40-64: CPT | Mod: 25 | Performed by: PHYSICIAN ASSISTANT

## 2020-07-30 PROCEDURE — 36415 COLL VENOUS BLD VENIPUNCTURE: CPT | Performed by: PHYSICIAN ASSISTANT

## 2020-07-30 PROCEDURE — 90750 HZV VACC RECOMBINANT IM: CPT | Performed by: PHYSICIAN ASSISTANT

## 2020-07-30 PROCEDURE — 90471 IMMUNIZATION ADMIN: CPT | Performed by: PHYSICIAN ASSISTANT

## 2020-07-30 PROCEDURE — 80061 LIPID PANEL: CPT | Performed by: PHYSICIAN ASSISTANT

## 2020-07-30 RX ORDER — CETIRIZINE HYDROCHLORIDE 10 MG/1
10 TABLET ORAL DAILY
COMMUNITY

## 2020-07-30 ASSESSMENT — PATIENT HEALTH QUESTIONNAIRE - PHQ9
SUM OF ALL RESPONSES TO PHQ QUESTIONS 1-9: 4
5. POOR APPETITE OR OVEREATING: NOT AT ALL

## 2020-07-30 ASSESSMENT — ANXIETY QUESTIONNAIRES
1. FEELING NERVOUS, ANXIOUS, OR ON EDGE: NOT AT ALL
IF YOU CHECKED OFF ANY PROBLEMS ON THIS QUESTIONNAIRE, HOW DIFFICULT HAVE THESE PROBLEMS MADE IT FOR YOU TO DO YOUR WORK, TAKE CARE OF THINGS AT HOME, OR GET ALONG WITH OTHER PEOPLE: NOT DIFFICULT AT ALL
6. BECOMING EASILY ANNOYED OR IRRITABLE: SEVERAL DAYS
7. FEELING AFRAID AS IF SOMETHING AWFUL MIGHT HAPPEN: NOT AT ALL
3. WORRYING TOO MUCH ABOUT DIFFERENT THINGS: NOT AT ALL
5. BEING SO RESTLESS THAT IT IS HARD TO SIT STILL: NOT AT ALL
GAD7 TOTAL SCORE: 1
2. NOT BEING ABLE TO STOP OR CONTROL WORRYING: NOT AT ALL

## 2020-07-30 ASSESSMENT — MIFFLIN-ST. JEOR: SCORE: 1309.73

## 2020-07-30 ASSESSMENT — PAIN SCALES - GENERAL: PAINLEVEL: NO PAIN (0)

## 2020-07-30 NOTE — NURSING NOTE
"Chief Complaint   Patient presents with     Physical     Thyroid Problem       Initial /70   Pulse 73   Temp 97.2  F (36.2  C) (Tympanic)   Resp 20   Ht 1.632 m (5' 4.25\")   Wt 72.6 kg (160 lb)   LMP 05/15/2015   SpO2 97%   BMI 27.25 kg/m   Estimated body mass index is 27.25 kg/m  as calculated from the following:    Height as of this encounter: 1.632 m (5' 4.25\").    Weight as of this encounter: 72.6 kg (160 lb).  Medication Reconciliation: complete  Mickie Wnyn LPN  "

## 2020-07-31 ASSESSMENT — ANXIETY QUESTIONNAIRES: GAD7 TOTAL SCORE: 1

## 2020-08-11 DIAGNOSIS — E04.1 THYROID NODULE: ICD-10-CM

## 2020-08-11 RX ORDER — THYROID 30 MG/1
30 TABLET ORAL DAILY
Qty: 30 TABLET | Refills: 0 | Status: SHIPPED | OUTPATIENT
Start: 2020-08-11 | End: 2020-09-10

## 2020-08-11 NOTE — TELEPHONE ENCOUNTER
Do Thyroid 30mg     Last Written Prescription Date:  7/8/20  Last Fill Quantity: 30 tablet,   # refills: 0  Last Office Visit: 7/30/20  Future Office visit:

## 2020-08-12 RX ORDER — LEVOTHYROXINE, LIOTHYRONINE 19; 4.5 UG/1; UG/1
TABLET ORAL
Qty: 90 TABLET | OUTPATIENT
Start: 2020-08-12

## 2020-09-02 ENCOUNTER — ANCILLARY PROCEDURE (OUTPATIENT)
Dept: MAMMOGRAPHY | Facility: OTHER | Age: 55
End: 2020-09-02
Attending: PHYSICIAN ASSISTANT
Payer: COMMERCIAL

## 2020-09-02 DIAGNOSIS — Z12.31 ENCOUNTER FOR SCREENING MAMMOGRAM FOR BREAST CANCER: ICD-10-CM

## 2020-09-02 PROCEDURE — 77067 SCR MAMMO BI INCL CAD: CPT | Mod: TC

## 2020-09-10 DIAGNOSIS — E04.1 THYROID NODULE: ICD-10-CM

## 2020-09-10 RX ORDER — LEVOTHYROXINE, LIOTHYRONINE 19; 4.5 UG/1; UG/1
TABLET ORAL
Qty: 30 TABLET | Refills: 0 | Status: SHIPPED | OUTPATIENT
Start: 2020-09-10 | End: 2020-10-13

## 2020-09-10 NOTE — TELEPHONE ENCOUNTER
Do Thyroid       Last Written Prescription Date:  8/11/2020  Last Fill Quantity: 30,   # refills: 0  Last Office Visit: 7/30/2020  Future Office visit:

## 2020-10-13 ENCOUNTER — OFFICE VISIT (OUTPATIENT)
Dept: DERMATOLOGY | Facility: OTHER | Age: 55
End: 2020-10-13
Attending: PHYSICIAN ASSISTANT
Payer: COMMERCIAL

## 2020-10-13 VITALS
RESPIRATION RATE: 14 BRPM | BODY MASS INDEX: 30.58 KG/M2 | HEIGHT: 61 IN | TEMPERATURE: 96.8 F | SYSTOLIC BLOOD PRESSURE: 98 MMHG | DIASTOLIC BLOOD PRESSURE: 60 MMHG | HEART RATE: 76 BPM | WEIGHT: 162 LBS | OXYGEN SATURATION: 95 %

## 2020-10-13 DIAGNOSIS — E04.1 THYROID NODULE: ICD-10-CM

## 2020-10-13 DIAGNOSIS — L20.82 FLEXURAL ECZEMA: ICD-10-CM

## 2020-10-13 DIAGNOSIS — L28.0 LICHEN SIMPLEX CHRONICUS: Primary | ICD-10-CM

## 2020-10-13 PROCEDURE — 11900 INJECT SKIN LESIONS </W 7: CPT | Performed by: DERMATOLOGY

## 2020-10-13 PROCEDURE — 99201 PR OFFICE/OUTPT VISIT, NEW, LEVEL I: CPT | Mod: 25 | Performed by: DERMATOLOGY

## 2020-10-13 RX ORDER — TRIAMCINOLONE ACETONIDE 1 MG/G
CREAM TOPICAL
Qty: 80 G | Refills: 1 | Status: SHIPPED | OUTPATIENT
Start: 2020-10-13 | End: 2021-12-16

## 2020-10-13 RX ORDER — THYROID 30 MG/1
30 TABLET ORAL DAILY
Qty: 30 TABLET | Refills: 0 | Status: SHIPPED | OUTPATIENT
Start: 2020-10-13 | End: 2020-11-11

## 2020-10-13 ASSESSMENT — PAIN SCALES - GENERAL: PAINLEVEL: NO PAIN (0)

## 2020-10-13 ASSESSMENT — MIFFLIN-ST. JEOR: SCORE: 1267.21

## 2020-10-13 NOTE — PATIENT INSTRUCTIONS
The lesions on your elbows and forearm are called lichen simplex chronicus - pink thick patches due to rubbing due to nagging itch.  A type of eczema.     We injected triamcinolone suspension into the lesions (intralesionally). This should stop the itch and soften the lesions.    I will Rx a triamcinolone cream to use if the lesions start to return. It will work better if you occlude it (Saran Wrap) after applying the cream.    Also can re-inject at any time if needed. You may note a slight dip in the skin in a few weeks that sometimes happens but with time the dip disappears.

## 2020-10-13 NOTE — TELEPHONE ENCOUNTER
Thyroid       Last Written Prescription Date:  9/10/2020  Last Fill Quantity: 30,   # refills: 0  Last Office Visit: 7/30/2020  Future Office visit:

## 2020-10-13 NOTE — NURSING NOTE
"Chief Complaint   Patient presents with     Eczema     new patient, 2 years elbows and forearms       Initial BP 98/60 (BP Location: Right arm, Cuff Size: Adult Regular)   Pulse 76   Temp 96.8  F (36  C) (Tympanic)   Resp 14   Ht 1.549 m (5' 1\")   Wt 73.5 kg (162 lb)   LMP 05/15/2015   SpO2 95%   BMI 30.61 kg/m   Estimated body mass index is 30.61 kg/m  as calculated from the following:    Height as of this encounter: 1.549 m (5' 1\").    Weight as of this encounter: 73.5 kg (162 lb).  Medication Reconciliation: complete  Tali Schroeder LPN  "

## 2020-10-13 NOTE — PROGRESS NOTES
Visit Date:   10/13/2020      SUBJECTIVE:  Mattie has had a long-term problem with itchy elbows and a lesion on her right forearm that itches.      OBJECTIVE:  Exam shows well-defined lichenoid lesions that suggest this to be lichen simplex chronicus.  Apparently this is a problem of her siblings, they simply scratch and get these lesions.      ASSESSMENT:  Classic lichen simplex chronicus on the elbows and right forearm.      PLAN:  I recommended intralesional steroid to get things started.  I advised that this would make the lesions pretty much disappear within a few weeks in most cases.  I also gave her a prescription for 0.1 triamcinolone to use if the lesions start to return and itching persists.  I have advised that this is a harmless process clearly due to the mechanics of rubbing and scratching creating these plaque-like lesions.  Some resemble psoriasis, but psoriasis generally is not itchy, and are more scaly than the lesions seen today.      With her permission, we went ahead and injected intralesionally  both elbow and forearm lesions with 10mg/cc triamcinolone suspension..  I advised her there could be some temporary atrophy at the sites of injection, but that should clear.      MEDICATIONS AND ALLERGIES:  Reviewed.       Return p.r.n.         H WILLOW HADLEY MD             D: 10/13/2020   T: 10/13/2020   MT: PENNY      Name:     MATTIE KHALIL   MRN:      [id number]        Account:      [id number]   :      1965           Visit Date:   10/13/2020      Document: [id number]       cc: Tracey MAYORGA

## 2020-10-13 NOTE — LETTER
10/13/2020       RE: Mattie Edwards  [address] [place] [address]     Dear Colleague,    Thank you for referring your patient, Mattie Edwards, to the [facility]. Please see a copy of my visit note below.    Visit Date:   10/13/2020      SUBJECTIVE:  Mattie has had a long-term problem with itchy elbows and a lesion on her right forearm that itches.      OBJECTIVE:  Exam shows well-defined lichenoid lesions that suggest this to be lichen simplex chronicus.  Apparently this is a problem of her siblings, they simply scratch and get these lesions.      ASSESSMENT:  Classic lichen simplex chronicus on the elbows and right forearm.      PLAN:  I recommended intralesional steroid to get things started.  I advised that this would make the lesions pretty much disappear within a few weeks in most cases.  I also gave her a prescription for 0.1 triamcinolone to use if the lesions start to return and itching persists.  I have advised that this is a harmless process clearly due to the mechanics of rubbing and scratching creating these plaque-like lesions.  Some resemble psoriasis, but psoriasis generally is not itchy, and are more scaly than the lesions seen today.      With her permission, we went ahead and injected intralesionally  both elbow and forearm lesions with 10mg/cc triamcinolone suspension..  I advised her there could be some temporary atrophy at the sites of injection, but that should clear.      MEDICATIONS AND ALLERGIES:  Reviewed.       Return p.r.n.         H WILLOW HADLEY MD             D: 10/13/2020   T: 10/13/2020   MT: PENNY      Name:     MATTIE EDWARDS   MRN:      [id number]        Account:      [id number]   :      1965           Visit Date:   10/13/2020      Document: [id number]       cc: Tracey MAYORGA         Again, thank you for allowing me to participate in the care of your patient.       Sincerely,    STEPHANIE Artis MD

## 2020-10-19 ENCOUNTER — ALLIED HEALTH/NURSE VISIT (OUTPATIENT)
Dept: FAMILY MEDICINE | Facility: OTHER | Age: 55
End: 2020-10-19
Attending: PHYSICIAN ASSISTANT
Payer: COMMERCIAL

## 2020-10-19 DIAGNOSIS — Z23 NEED FOR VACCINATION: Primary | ICD-10-CM

## 2020-10-19 PROCEDURE — 90750 HZV VACC RECOMBINANT IM: CPT

## 2020-10-19 PROCEDURE — 90471 IMMUNIZATION ADMIN: CPT

## 2020-11-11 DIAGNOSIS — E04.1 THYROID NODULE: ICD-10-CM

## 2020-11-11 RX ORDER — THYROID 30 MG/1
30 TABLET ORAL DAILY
Qty: 90 TABLET | Refills: 3 | Status: SHIPPED | OUTPATIENT
Start: 2020-11-11 | End: 2021-12-03

## 2020-11-11 NOTE — TELEPHONE ENCOUNTER
Thyroid NP  Last Written Prescription Date: 10/13/20  Last Fill Quantity: 30 # of Refills: 0  Last Office Visit: 7/30/20

## 2020-12-20 ENCOUNTER — HEALTH MAINTENANCE LETTER (OUTPATIENT)
Age: 55
End: 2020-12-20

## 2021-03-24 ENCOUNTER — IMMUNIZATION (OUTPATIENT)
Dept: FAMILY MEDICINE | Facility: OTHER | Age: 56
End: 2021-03-24
Attending: PHYSICIAN ASSISTANT
Payer: COMMERCIAL

## 2021-03-24 PROCEDURE — 0001A PR COVID VAC PFIZER DIL RECON 30 MCG/0.3 ML IM: CPT

## 2021-03-24 PROCEDURE — 91300 PR COVID VAC PFIZER DIL RECON 30 MCG/0.3 ML IM: CPT

## 2021-04-14 ENCOUNTER — IMMUNIZATION (OUTPATIENT)
Dept: FAMILY MEDICINE | Facility: OTHER | Age: 56
End: 2021-04-14
Attending: FAMILY MEDICINE
Payer: COMMERCIAL

## 2021-04-14 PROCEDURE — 91300 PR COVID VAC PFIZER DIL RECON 30 MCG/0.3 ML IM: CPT

## 2021-04-14 PROCEDURE — 0002A PR COVID VAC PFIZER DIL RECON 30 MCG/0.3 ML IM: CPT

## 2021-10-15 DIAGNOSIS — Z12.31 VISIT FOR SCREENING MAMMOGRAM: Primary | ICD-10-CM

## 2021-11-29 DIAGNOSIS — E04.1 THYROID NODULE: ICD-10-CM

## 2021-12-02 NOTE — TELEPHONE ENCOUNTER
Thyroid      Last Written Prescription Date:  11/11/20  Last Fill Quantity: 90,   # refills: 3  Last Office Visit: 7/30/20  Future Office visit:    Next 5 appointments (look out 90 days)    Dec 16, 2021  9:15 AM  (Arrive by 9:00 AM)  PHYSICAL with MUKESH Wallis  [facility] [place] ([facility] [place] ) [address]  [place] [address]  [phone]           Routing refill request to provider for review/approval because:

## 2021-12-03 RX ORDER — LEVOTHYROXINE, LIOTHYRONINE 19; 4.5 UG/1; UG/1
TABLET ORAL
Qty: 90 TABLET | Refills: 3 | Status: SHIPPED | OUTPATIENT
Start: 2021-12-03 | End: 2023-01-23

## 2021-12-15 NOTE — PROGRESS NOTES
SUBJECTIVE:   CC: Mattie Edwards is an 56 year old woman who presents for preventive health visit.   Patient has been advised of split billing requirements and indicates understanding: Yes     HPI    Depression and Anxiety Follow-Up    How are you doing with your depression since your last visit? Improved     How are you doing with your anxiety since your last visit?  Improved     Are you having other symptoms that might be associated with depression or anxiety? No    Have you had a significant life event? No     Do you have any concerns with your use of alcohol or other drugs? No    Social History     Tobacco Use     Smoking status: Never Smoker     Smokeless tobacco: Never Used     Tobacco comment: no passive exposure   Substance Use Topics     Alcohol use: No     Drug use: No     PHQ 6/27/2019 7/30/2020 12/16/2021   PHQ-9 Total Score 4 4 0   Q9: Thoughts of better off dead/self-harm past 2 weeks Not at all Not at all Not at all     MARILYN-7 SCORE 6/27/2019 7/30/2020 12/16/2021   Total Score 4 1 0     Last PHQ-9 12/16/2021   1.  Little interest or pleasure in doing things 0   2.  Feeling down, depressed, or hopeless 0   3.  Trouble falling or staying asleep, or sleeping too much 0   4.  Feeling tired or having little energy 0   5.  Poor appetite or overeating 0   6.  Feeling bad about yourself 0   7.  Trouble concentrating 0   8.  Moving slowly or restless 0   Q9: Thoughts of better off dead/self-harm past 2 weeks 0   PHQ-9 Total Score 0   Difficulty at work, home, or with people Not difficult at all     MARILYN-7  12/16/2021   1. Feeling nervous, anxious, or on edge 0   2. Not being able to stop or control worrying 0   3. Worrying too much about different things 0   4. Trouble relaxing 0   5. Being so restless that it is hard to sit still 0   6. Becoming easily annoyed or irritable 0   7. Feeling afraid, as if something awful might happen 0   MARILYN-7 Total Score 0   If you checked any problems, how difficult have  they made it for you to do your work, take care of things at home, or get along with other people? Not difficult at all           Today's PHQ-2 Score:   PHQ-2 ( 1999 [employer]) 10/13/2020   Q1: Little interest or pleasure in doing things 0   Q2: Feeling down, depressed or hopeless 0   PHQ-2 Score 0   PHQ-2 Total Score (12-17 Years)- Positive if 3 or more points; Administer PHQ-A if positive 0   Q1: Little interest or pleasure in doing things -   Q2: Feeling down, depressed or hopeless -   PHQ-2 Score -       Abuse: Current or Past (Physical, Sexual or Emotional) - No  Do you feel safe in your environment? Yes        Social History     Tobacco Use     Smoking status: Never Smoker     Smokeless tobacco: Never Used     Tobacco comment: no passive exposure   Substance Use Topics     Alcohol use: No     If you drink alcohol do you typically have >3 drinks per day or >7 drinks per week? No    Alcohol Use 12/16/2021   Prescreen: >3 drinks/day or >7 drinks/week? -   Prescreen: >3 drinks/day or >7 drinks/week? No       Reviewed orders with patient.  Reviewed health maintenance and updated orders accordingly - Yes  Lab work is in process  Labs reviewed in EPIC  BP Readings from Last 3 Encounters:   12/16/21 104/60   10/13/20 98/60   07/30/20 122/70    Wt Readings from Last 3 Encounters:   12/16/21 69.9 kg (154 lb)   10/13/20 73.5 kg (162 lb)   07/30/20 72.6 kg (160 lb)                  Patient Active Problem List   Diagnosis     Major depressive disorder, single episode, mild (H)     Left Thyroid nodules     hypothyroidism     Perennial allergic rhinitis     Chronic maxillary sinusitis     Chronic ethmoidal sinusitis     Nasal turbinate hypertrophy     ACP (advance care planning)     Scleritis and episcleritis of both eyes     Meniere's disease, bilateral     Past Surgical History:   Procedure Laterality Date     NO HISTORY OF SURGERY         Social History     Tobacco Use     Smoking status: Never Smoker     Smokeless tobacco:  Never Used     Tobacco comment: no passive exposure   Substance Use Topics     Alcohol use: No     Family History   Problem Relation Age of Onset     Cancer Father         brain; cause of death     Myocardial Infarction Paternal Grandfather      Myocardial Infarction Maternal Grandmother      Thyroid Cancer Cousin      Hypertension Other         family history     Sleep Apnea Other         family history     Seasonal/Environmental Allergies Other         family history     Snoring Other         family history         Current Outpatient Medications   Medication Sig Dispense Refill     cetirizine (ZYRTEC) 10 MG tablet Take 10 mg by mouth daily       meclizine (ANTIVERT) 25 MG tablet Take 1 tablet (25 mg) by mouth 3 times daily as needed for dizziness 30 tablet 0     NP THYROID 30 MG tablet TAKE 1 TABLET(30 MG) BY MOUTH DAILY 90 tablet 3     SUMAtriptan (IMITREX) 100 MG tablet TAKE ONE TABLET BY MOUTH AS NEEDED 10 tablet 1     No Known Allergies  Recent Labs   Lab Test 07/30/20  1027 07/10/19  0812 06/27/19  1416 05/03/18  1102   * 135*  --  143*   HDL 73 75  --  77   TRIG 128 134  --  98   ALT 27 24  --  24   CR 0.84 0.90  --  0.80   GFRESTIMATED 79 72  --  75   GFRESTBLACK >90 84  --  >90   POTASSIUM 4.2 3.8  --  4.2   TSH 1.87  --  1.25 1.02        Breast Cancer Screening:  Any new diagnosis of family breast, ovarian, or bowel cancer? No    FHS-7: No flowsheet data found.  click delete button to remove this line now  Mammogram Screening: Recommended mammography every 1-2 years with patient discussion and risk factor consideration  Pertinent mammograms are reviewed under the imaging tab.    History of abnormal Pap smear:   Last 3 Pap and HPV Results:   PAP / HPV Latest Ref Rng & Units 6/27/2019 1/27/2016   PAP (Historical) - NIL NIL   HPV16 NEG:Negative Negative -   HPV18 NEG:Negative Negative -   HRHPV NEG:Negative Negative -     PAP / HPV Latest Ref Rng & Units 6/27/2019 1/27/2016   PAP (Historical) - NIL  "NIL   HPV16 NEG:Negative Negative -   HPV18 NEG:Negative Negative -   HRHPV NEG:Negative Negative -     Reviewed and updated as needed this visit by clinical staff  Tobacco  Allergies  Meds             Reviewed and updated as needed this visit by Provider                 Past Medical History:   Diagnosis Date     Major depressive disorder, single episode, mild (H) 9/19/2011     Thyroid disease       Past Surgical History:   Procedure Laterality Date     NO HISTORY OF SURGERY       OB History   No obstetric history on file.       Review of Systems  CONSTITUTIONAL: NEGATIVE for fever, chills, change in weight  INTEGUMENTARY/SKIN: NEGATIVE for worrisome rashes, moles or lesions  EYES: NEGATIVE for vision changes or irritation  ENT: NEGATIVE for ear, mouth and throat problems  RESP: NEGATIVE for significant cough or SOB  BREAST: NEGATIVE for masses, tenderness or discharge  CV: NEGATIVE for chest pain, palpitations or peripheral edema  GI: NEGATIVE for nausea, abdominal pain, heartburn, or change in bowel habits  : NEGATIVE for unusual urinary or vaginal symptoms. No vaginal bleeding.  MUSCULOSKELETAL: NEGATIVE for significant arthralgias or myalgia  NEURO: NEGATIVE for weakness, dizziness or paresthesias  PSYCHIATRIC: NEGATIVE for changes in mood or affect      OBJECTIVE:   /60   Pulse 73   Temp 97.5  F (36.4  C) (Tympanic)   Ht 1.549 m (5' 1\")   Wt 69.9 kg (154 lb)   LMP 05/15/2015   SpO2 97%   BMI 29.10 kg/m    Physical Exam  GENERAL: healthy, alert and no distress  EYES: Eyes grossly normal to inspection, PERRL and conjunctivae and sclerae normal  HENT: ear canals and TM's normal, nose and mouth without ulcers or lesions  NECK: no adenopathy, no asymmetry, masses, or scars and thyroid normal to palpation  RESP: lungs clear to auscultation - no rales, rhonchi or wheezes  BREAST: normal without masses, tenderness or nipple discharge and no palpable axillary masses or adenopathy  CV: regular rate and " "rhythm, normal S1 S2, no S3 or S4, no murmur, click or rub, no peripheral edema and peripheral pulses strong  ABDOMEN: soft, nontender, no hepatosplenomegaly, no masses and bowel sounds normal  MS: no gross musculoskeletal defects noted, no edema  SKIN: no suspicious lesions or rashes  NEURO: Normal strength and tone, mentation intact and speech normal  PSYCH: mentation appears normal, affect normal/bright  LYMPH: no cervical, supraclavicular, axillary, or inguinal adenopathy    Diagnostic Test Results:  Labs reviewed in Epic  No results found for this or any previous visit (from the past 24 hour(s)).    ASSESSMENT/PLAN:   (Z00.00) Routine general medical examination at a health care facility  (primary encounter diagnosis)  Comment: she is doing ok.  Had covid with her .  She did not get it.  She is going to be given labs today. Immunizations has her mammogram set.  No other concerns are noted. Did lose 10 # in the last year.  See us back as recommended.   Plan: Comprehensive metabolic panel (BMP + Alb, Alk         Phos, ALT, AST, Total. Bili, TP), CBC with         platelets and differential, Lipid Profile         (Chol, Trig, HDL, LDL calc)            (E03.9) Acquired hypothyroidism  Comment: she is doing well.   Plan: TSH with free T4 reflex        No other sx.       Patient has been advised of split billing requirements and indicates understanding: Yes  COUNSELING:  Reviewed preventive health counseling, as reflected in patient instructions       Regular exercise       Healthy diet/nutrition       Vision screening       Immunizations    Vaccinated for: Influenza             Osteoporosis prevention/bone health       Advance Care Planning    Estimated body mass index is 29.1 kg/m  as calculated from the following:    Height as of this encounter: 1.549 m (5' 1\").    Weight as of this encounter: 69.9 kg (154 lb).        She reports that she has never smoked. She has never used smokeless " tobacco.      Counseling Resources:  ATP IV Guidelines  Pooled Cohorts Equation Calculator  Breast Cancer Risk Calculator  BRCA-Related Cancer Risk Assessment: FHS-7 Tool  FRAX Risk Assessment  ICSI Preventive Guidelines  Dietary Guidelines for Americans, 2010  USDA's MyPlate  ASA Prophylaxis  Lung CA Screening    MUKESH Leon  [facility]

## 2021-12-16 ENCOUNTER — OFFICE VISIT (OUTPATIENT)
Dept: FAMILY MEDICINE | Facility: OTHER | Age: 56
End: 2021-12-16
Attending: PHYSICIAN ASSISTANT
Payer: COMMERCIAL

## 2021-12-16 VITALS
BODY MASS INDEX: 29.07 KG/M2 | WEIGHT: 154 LBS | HEART RATE: 73 BPM | SYSTOLIC BLOOD PRESSURE: 104 MMHG | TEMPERATURE: 97.5 F | OXYGEN SATURATION: 97 % | HEIGHT: 61 IN | DIASTOLIC BLOOD PRESSURE: 60 MMHG

## 2021-12-16 DIAGNOSIS — E03.9 ACQUIRED HYPOTHYROIDISM: ICD-10-CM

## 2021-12-16 DIAGNOSIS — Z00.00 ROUTINE GENERAL MEDICAL EXAMINATION AT A HEALTH CARE FACILITY: Primary | ICD-10-CM

## 2021-12-16 LAB
ALBUMIN SERPL-MCNC: 4.1 G/DL (ref 3.4–5)
ALP SERPL-CCNC: 79 U/L (ref 40–150)
ALT SERPL W P-5'-P-CCNC: 32 U/L (ref 0–50)
ANION GAP SERPL CALCULATED.3IONS-SCNC: 4 MMOL/L (ref 3–14)
AST SERPL W P-5'-P-CCNC: 16 U/L (ref 0–45)
BASOPHILS # BLD AUTO: 0 10E3/UL (ref 0–0.2)
BASOPHILS NFR BLD AUTO: 1 %
BILIRUB SERPL-MCNC: 0.4 MG/DL (ref 0.2–1.3)
BUN SERPL-MCNC: 15 MG/DL (ref 7–30)
CALCIUM SERPL-MCNC: 9.1 MG/DL (ref 8.5–10.1)
CHLORIDE BLD-SCNC: 107 MMOL/L (ref 94–109)
CHOLEST SERPL-MCNC: 224 MG/DL
CO2 SERPL-SCNC: 27 MMOL/L (ref 20–32)
CREAT SERPL-MCNC: 0.8 MG/DL (ref 0.52–1.04)
EOSINOPHIL # BLD AUTO: 0.4 10E3/UL (ref 0–0.7)
EOSINOPHIL NFR BLD AUTO: 7 %
ERYTHROCYTE [DISTWIDTH] IN BLOOD BY AUTOMATED COUNT: 13.1 % (ref 10–15)
FASTING STATUS PATIENT QL REPORTED: YES
GFR SERPL CREATININE-BSD FRML MDRD: 83 ML/MIN/1.73M2
GLUCOSE BLD-MCNC: 97 MG/DL (ref 70–99)
HCT VFR BLD AUTO: 44.6 % (ref 35–47)
HDLC SERPL-MCNC: 81 MG/DL
HGB BLD-MCNC: 14.7 G/DL (ref 11.7–15.7)
IMM GRANULOCYTES # BLD: 0 10E3/UL
IMM GRANULOCYTES NFR BLD: 0 %
LDLC SERPL CALC-MCNC: 110 MG/DL
LYMPHOCYTES # BLD AUTO: 1.7 10E3/UL (ref 0.8–5.3)
LYMPHOCYTES NFR BLD AUTO: 28 %
MCH RBC QN AUTO: 29.1 PG (ref 26.5–33)
MCHC RBC AUTO-ENTMCNC: 33 G/DL (ref 31.5–36.5)
MCV RBC AUTO: 88 FL (ref 78–100)
MONOCYTES # BLD AUTO: 0.5 10E3/UL (ref 0–1.3)
MONOCYTES NFR BLD AUTO: 9 %
NEUTROPHILS # BLD AUTO: 3.4 10E3/UL (ref 1.6–8.3)
NEUTROPHILS NFR BLD AUTO: 55 %
NONHDLC SERPL-MCNC: 143 MG/DL
NRBC # BLD AUTO: 0 10E3/UL
NRBC BLD AUTO-RTO: 0 /100
PLATELET # BLD AUTO: 298 10E3/UL (ref 150–450)
POTASSIUM BLD-SCNC: 3.8 MMOL/L (ref 3.4–5.3)
PROT SERPL-MCNC: 8.4 G/DL (ref 6.8–8.8)
RBC # BLD AUTO: 5.06 10E6/UL (ref 3.8–5.2)
SODIUM SERPL-SCNC: 138 MMOL/L (ref 133–144)
TRIGL SERPL-MCNC: 165 MG/DL
TSH SERPL DL<=0.005 MIU/L-ACNC: 0.98 MU/L (ref 0.4–4)
WBC # BLD AUTO: 6.2 10E3/UL (ref 4–11)

## 2021-12-16 PROCEDURE — 36415 COLL VENOUS BLD VENIPUNCTURE: CPT | Performed by: PHYSICIAN ASSISTANT

## 2021-12-16 PROCEDURE — 80061 LIPID PANEL: CPT | Performed by: PHYSICIAN ASSISTANT

## 2021-12-16 PROCEDURE — 90471 IMMUNIZATION ADMIN: CPT | Performed by: PHYSICIAN ASSISTANT

## 2021-12-16 PROCEDURE — 90682 RIV4 VACC RECOMBINANT DNA IM: CPT | Performed by: PHYSICIAN ASSISTANT

## 2021-12-16 PROCEDURE — 80050 GENERAL HEALTH PANEL: CPT | Performed by: PHYSICIAN ASSISTANT

## 2021-12-16 PROCEDURE — 99396 PREV VISIT EST AGE 40-64: CPT | Mod: 25 | Performed by: PHYSICIAN ASSISTANT

## 2021-12-16 ASSESSMENT — ANXIETY QUESTIONNAIRES
7. FEELING AFRAID AS IF SOMETHING AWFUL MIGHT HAPPEN: NOT AT ALL
IF YOU CHECKED OFF ANY PROBLEMS ON THIS QUESTIONNAIRE, HOW DIFFICULT HAVE THESE PROBLEMS MADE IT FOR YOU TO DO YOUR WORK, TAKE CARE OF THINGS AT HOME, OR GET ALONG WITH OTHER PEOPLE: NOT DIFFICULT AT ALL
6. BECOMING EASILY ANNOYED OR IRRITABLE: NOT AT ALL
2. NOT BEING ABLE TO STOP OR CONTROL WORRYING: NOT AT ALL
1. FEELING NERVOUS, ANXIOUS, OR ON EDGE: NOT AT ALL
3. WORRYING TOO MUCH ABOUT DIFFERENT THINGS: NOT AT ALL
5. BEING SO RESTLESS THAT IT IS HARD TO SIT STILL: NOT AT ALL
4. TROUBLE RELAXING: NOT AT ALL
GAD7 TOTAL SCORE: 0

## 2021-12-16 ASSESSMENT — MIFFLIN-ST. JEOR: SCORE: 1225.92

## 2021-12-16 ASSESSMENT — PATIENT HEALTH QUESTIONNAIRE - PHQ9: SUM OF ALL RESPONSES TO PHQ QUESTIONS 1-9: 0

## 2021-12-16 ASSESSMENT — PAIN SCALES - GENERAL: PAINLEVEL: NO PAIN (0)

## 2021-12-16 NOTE — NURSING NOTE
"Chief Complaint   Patient presents with     Physical       Initial /60   Pulse 73   Temp 97.5  F (36.4  C) (Tympanic)   Ht 1.549 m (5' 1\")   Wt 69.9 kg (154 lb)   LMP 05/15/2015   SpO2 97%   BMI 29.10 kg/m   Estimated body mass index is 29.1 kg/m  as calculated from the following:    Height as of this encounter: 1.549 m (5' 1\").    Weight as of this encounter: 69.9 kg (154 lb).  Medication Reconciliation: complete  Lucila Bob LPN  "

## 2021-12-17 ASSESSMENT — ANXIETY QUESTIONNAIRES: GAD7 TOTAL SCORE: 0

## 2022-01-04 ENCOUNTER — TELEPHONE (OUTPATIENT)
Dept: MAMMOGRAPHY | Facility: OTHER | Age: 57
End: 2022-01-04

## 2022-01-04 ENCOUNTER — ANCILLARY PROCEDURE (OUTPATIENT)
Dept: MAMMOGRAPHY | Facility: OTHER | Age: 57
End: 2022-01-04
Attending: PHYSICIAN ASSISTANT
Payer: COMMERCIAL

## 2022-01-04 DIAGNOSIS — Z12.31 VISIT FOR SCREENING MAMMOGRAM: ICD-10-CM

## 2022-01-04 PROCEDURE — 77067 SCR MAMMO BI INCL CAD: CPT | Mod: TC | Performed by: RADIOLOGY

## 2022-01-11 ENCOUNTER — IMMUNIZATION (OUTPATIENT)
Dept: FAMILY MEDICINE | Facility: OTHER | Age: 57
End: 2022-01-11
Attending: PHYSICIAN ASSISTANT
Payer: COMMERCIAL

## 2022-01-11 PROCEDURE — 0004A PR COVID VAC PFIZER DIL RECON 30 MCG/0.3 ML IM: CPT

## 2022-01-11 PROCEDURE — 91300 PR COVID VAC PFIZER DIL RECON 30 MCG/0.3 ML IM: CPT

## 2022-06-21 ENCOUNTER — APPOINTMENT (OUTPATIENT)
Dept: GENERAL RADIOLOGY | Facility: CLINIC | Age: 57
End: 2022-06-21
Attending: EMERGENCY MEDICINE
Payer: COMMERCIAL

## 2022-06-21 ENCOUNTER — HOSPITAL ENCOUNTER (EMERGENCY)
Facility: CLINIC | Age: 57
Discharge: HOME | End: 2022-06-21
Attending: EMERGENCY MEDICINE | Admitting: EMERGENCY MEDICINE
Payer: COMMERCIAL

## 2022-06-21 VITALS
DIASTOLIC BLOOD PRESSURE: 54 MMHG | BODY MASS INDEX: 29.27 KG/M2 | RESPIRATION RATE: 16 BRPM | TEMPERATURE: 98.9 F | SYSTOLIC BLOOD PRESSURE: 132 MMHG | WEIGHT: 155 LBS | HEART RATE: 87 BPM | OXYGEN SATURATION: 99 % | HEIGHT: 61 IN

## 2022-06-21 DIAGNOSIS — M79.671 RIGHT FOOT PAIN: ICD-10-CM

## 2022-06-21 PROCEDURE — 73630 X-RAY EXAM OF FOOT: CPT | Mod: RT

## 2022-06-21 PROCEDURE — 99283 EMERGENCY DEPT VISIT LOW MDM: CPT | Mod: 25

## 2022-06-22 NOTE — DISCHARGE INSTRUCTIONS
Please ice, elevate and use tylenol for pain.  Weight bear as tolerated.  Follow up with ortho if continued pain.

## 2022-06-22 NOTE — ED TRIAGE NOTES
Rolled right ankle/foot outward while walking down a step. Pain in lateral aspect of right foot. Able to bear weight.     Triage Assessment     Row Name 06/21/22 2033       Triage Assessment (Adult)    Airway WDL WDL       Respiratory WDL    Respiratory WDL WDL       Skin Circulation/Temperature WDL    Skin Circulation/Temperature WDL WDL       Cardiac WDL    Cardiac WDL WDL       Peripheral/Neurovascular WDL    Peripheral Neurovascular WDL WDL       Cognitive/Neuro/Behavioral WDL    Cognitive/Neuro/Behavioral WDL WDL

## 2022-06-22 NOTE — ED PROVIDER NOTES
"  History   Chief Complaint:  Foot pain    The history is provided by the patient.      Mattie Edwards is a 56 year old female with history of hypothyroidism who presents with foot pain. The patient reports that she rolled her right foot outwards after missing a step at around 1230 today. She reports pain on the lateral aspect of her right foot and some mild pain in her left knee that is not very bothersome. She states that she could initially bear weight on the foot, however throughout the day it became more difficult. She denies any pain in the ankle or higher up the leg. She denies any head injury or chest pain or shortness of breath prior to the fall. She states that she took 0.5 of an acetaminophen pill earlier which did not provide much relief. She reports that she has had similar accidents to this before. Lastly, she denies being on blood thinners.     Review of Systems   All other systems reviewed and are negative.      Allergies:  The patient has no known allergies.     Medications:  Zyrtec  Meclizine   Sumatriptan  NP thyroid     Past Medical History:     Major depressive disorder  Left thyroid nodules  Hypothyroidism  Perennial allergic rhinitis  Chronic maxillary sinusitis   Chronic ethmoidal sinusitis  Nasal turbinate hypertrophy  Scleritis and episcleritis of both eyes  Meniere's disease, bilateral    Family History:    Mother: breast cancer  Father: brain cancer    Social History:  The patient presents to the ED with   PCP: Tracey Leary    Physical Exam     Patient Vitals for the past 24 hrs:   BP Temp Temp src Pulse Resp SpO2 Height Weight   06/21/22 2034 132/54 98.9  F (37.2  C) Oral 87 16 99 % 1.549 m (5' 1\") 70.3 kg (155 lb)       Physical Exam  General: Resting on the bed.  Head: No obvious trauma to head.  Ears, Nose, Throat:  External ears normal.  Nose normal.    Eyes:  Conjunctivae clear.    CV: Regular rate and rhythm.  No murmurs.      Respiratory: Effort normal and breath " sounds normal.  No wheezing or crackles.   Gastrointestinal: Soft.  No distension. There is no tenderness.   Musculoskeletal: Right foot with tenderness over the lateral aspect.  No appreciable swelling or ecchymosis.  2+ DP pulses.  Sensation intact to light touch.  Nontender remainder of right lower extremity.  Neuro: Alert. Moving all extremities appropriately.  Normal speech.    Skin: Skin is warm and dry.  No rash noted.     Emergency Department Course     Imaging:  Foot  XR, G/E 3 views, right   Final Result   IMPRESSION:    1.  No fracture or joint malalignment.   2.  Mild hypertrophic change at the 1st metatarsal head median eminence, unchanged.        Report per radiology    Emergency Department Course:             Reviewed:  I reviewed nursing notes, vitals and past medical history    Assessments:   I obtained history and examined the patient as noted above.     Disposition:  The patient was discharged to home.     Impression & Plan     Medical Decision Makin-year-old female presents with foot pain.  Vital signs reassuring.  Broad differentials pursued including but not limited to fracture, contusion, sprain strain, neurovascular injury, dislocation, etc.  Overall patient has tenderness along the lateral aspect of the right foot.  X-ray shows no evidence of fracture or dislocation.  No abrasion, open wound, signs of cellulitis etc.  Neurovascular intact.  Likely sprain strain versus contusion.  Cam boot given for comfort.  Recommend follow-up with orthopedics if continued pain.  Weight-bear as tolerated.  Tylenol ibuprofen as needed.  Patient was discharged home.    Diagnosis:    ICD-10-CM    1. Right foot pain  M79.671        Scribe Disclosure:  I, Mary Ramos, am serving as a scribe at 9:55 PM on 2022 to document services personally performed by Kristen Matute MD based on my observations and the provider's statements to me.            Kristen Matute MD  22 [phone]

## 2023-01-15 ENCOUNTER — HEALTH MAINTENANCE LETTER (OUTPATIENT)
Age: 58
End: 2023-01-15

## 2023-01-23 DIAGNOSIS — E04.1 THYROID NODULE: ICD-10-CM

## 2023-01-23 RX ORDER — THYROID 30 MG/1
30 TABLET ORAL DAILY
Qty: 30 TABLET | Refills: 0 | Status: SHIPPED | OUTPATIENT
Start: 2023-01-23 | End: 2023-02-22

## 2023-01-23 NOTE — TELEPHONE ENCOUNTER
Thyroid      Last Written Prescription Date:  12/3/21  Last Fill Quantity: 90,   # refills: 3  Last Office Visit: 12/16/21  Future Office visit:    Next 5 appointments (look out 90 days)    Feb 16, 2023 10:15 AM  (Arrive by 10:00 AM)  PHYSICAL with MUKESH Wallis  [facility] [place] ([facility] [place] ) [address]  [place] [address]  [phone]           Routing refill request to provider for review/approval because:

## 2023-02-16 ENCOUNTER — OFFICE VISIT (OUTPATIENT)
Dept: FAMILY MEDICINE | Facility: OTHER | Age: 58
End: 2023-02-16
Attending: PHYSICIAN ASSISTANT
Payer: COMMERCIAL

## 2023-02-16 VITALS
WEIGHT: 152 LBS | SYSTOLIC BLOOD PRESSURE: 98 MMHG | RESPIRATION RATE: 18 BRPM | DIASTOLIC BLOOD PRESSURE: 70 MMHG | TEMPERATURE: 97.9 F | BODY MASS INDEX: 28.72 KG/M2 | HEART RATE: 69 BPM | OXYGEN SATURATION: 98 %

## 2023-02-16 DIAGNOSIS — H81.09 MENIERE'S DISEASE, UNSPECIFIED LATERALITY: ICD-10-CM

## 2023-02-16 DIAGNOSIS — R42 VERTIGO: ICD-10-CM

## 2023-02-16 DIAGNOSIS — Z01.419 WELL WOMAN EXAM WITH ROUTINE GYNECOLOGICAL EXAM: Primary | ICD-10-CM

## 2023-02-16 DIAGNOSIS — Z12.31 ENCOUNTER FOR SCREENING MAMMOGRAM FOR BREAST CANCER: ICD-10-CM

## 2023-02-16 DIAGNOSIS — H15.003 SCLERITIS AND EPISCLERITIS OF BOTH EYES: ICD-10-CM

## 2023-02-16 DIAGNOSIS — E03.8 OTHER SPECIFIED HYPOTHYROIDISM: ICD-10-CM

## 2023-02-16 DIAGNOSIS — G44.031 INTRACTABLE EPISODIC PAROXYSMAL HEMICRANIA: ICD-10-CM

## 2023-02-16 DIAGNOSIS — Z12.11 SCREEN FOR COLON CANCER: ICD-10-CM

## 2023-02-16 DIAGNOSIS — H15.103 SCLERITIS AND EPISCLERITIS OF BOTH EYES: ICD-10-CM

## 2023-02-16 LAB
ALBUMIN SERPL BCG-MCNC: 4.4 G/DL (ref 3.5–5.2)
ALBUMIN UR-MCNC: NEGATIVE MG/DL
ALP SERPL-CCNC: 84 U/L (ref 35–104)
ALT SERPL W P-5'-P-CCNC: 24 U/L (ref 10–35)
ANION GAP SERPL CALCULATED.3IONS-SCNC: 9 MMOL/L (ref 7–15)
APPEARANCE UR: CLEAR
AST SERPL W P-5'-P-CCNC: 20 U/L (ref 10–35)
BASOPHILS # BLD AUTO: 0.1 10E3/UL (ref 0–0.2)
BASOPHILS NFR BLD AUTO: 1 %
BILIRUB SERPL-MCNC: 0.3 MG/DL
BILIRUB UR QL STRIP: NEGATIVE
BUN SERPL-MCNC: 14.4 MG/DL (ref 6–20)
CALCIUM SERPL-MCNC: 9.7 MG/DL (ref 8.6–10)
CHLORIDE SERPL-SCNC: 101 MMOL/L (ref 98–107)
CHOLEST SERPL-MCNC: 236 MG/DL
COLOR UR AUTO: NORMAL
CREAT SERPL-MCNC: 0.85 MG/DL (ref 0.51–0.95)
DEPRECATED HCO3 PLAS-SCNC: 28 MMOL/L (ref 22–29)
EOSINOPHIL # BLD AUTO: 0.4 10E3/UL (ref 0–0.7)
EOSINOPHIL NFR BLD AUTO: 6 %
ERYTHROCYTE [DISTWIDTH] IN BLOOD BY AUTOMATED COUNT: 13 % (ref 10–15)
GFR SERPL CREATININE-BSD FRML MDRD: 79 ML/MIN/1.73M2
GLUCOSE SERPL-MCNC: 88 MG/DL (ref 70–99)
GLUCOSE UR STRIP-MCNC: NEGATIVE MG/DL
HCT VFR BLD AUTO: 43.9 % (ref 35–47)
HDLC SERPL-MCNC: 73 MG/DL
HGB BLD-MCNC: 14.6 G/DL (ref 11.7–15.7)
HGB UR QL STRIP: NEGATIVE
IMM GRANULOCYTES # BLD: 0 10E3/UL
IMM GRANULOCYTES NFR BLD: 0 %
KETONES UR STRIP-MCNC: NEGATIVE MG/DL
LDLC SERPL CALC-MCNC: 140 MG/DL
LEUKOCYTE ESTERASE UR QL STRIP: NEGATIVE
LYMPHOCYTES # BLD AUTO: 2.1 10E3/UL (ref 0.8–5.3)
LYMPHOCYTES NFR BLD AUTO: 33 %
MCH RBC QN AUTO: 28.9 PG (ref 26.5–33)
MCHC RBC AUTO-ENTMCNC: 33.3 G/DL (ref 31.5–36.5)
MCV RBC AUTO: 87 FL (ref 78–100)
MONOCYTES # BLD AUTO: 0.6 10E3/UL (ref 0–1.3)
MONOCYTES NFR BLD AUTO: 9 %
NEUTROPHILS # BLD AUTO: 3.2 10E3/UL (ref 1.6–8.3)
NEUTROPHILS NFR BLD AUTO: 51 %
NITRATE UR QL: NEGATIVE
NONHDLC SERPL-MCNC: 163 MG/DL
NRBC # BLD AUTO: 0 10E3/UL
NRBC BLD AUTO-RTO: 0 /100
PH UR STRIP: 7 [PH] (ref 4.7–8)
PLATELET # BLD AUTO: 278 10E3/UL (ref 150–450)
POTASSIUM SERPL-SCNC: 3.9 MMOL/L (ref 3.4–5.3)
PROT SERPL-MCNC: 7.7 G/DL (ref 6.4–8.3)
RBC # BLD AUTO: 5.05 10E6/UL (ref 3.8–5.2)
SODIUM SERPL-SCNC: 138 MMOL/L (ref 136–145)
SP GR UR STRIP: 1.01 (ref 1–1.03)
TRIGL SERPL-MCNC: 113 MG/DL
TSH SERPL DL<=0.005 MIU/L-ACNC: 1.1 UIU/ML (ref 0.3–4.2)
UROBILINOGEN UR STRIP-MCNC: NORMAL MG/DL
WBC # BLD AUTO: 6.3 10E3/UL (ref 4–11)

## 2023-02-16 PROCEDURE — 81003 URINALYSIS AUTO W/O SCOPE: CPT | Performed by: PHYSICIAN ASSISTANT

## 2023-02-16 PROCEDURE — 99396 PREV VISIT EST AGE 40-64: CPT | Performed by: PHYSICIAN ASSISTANT

## 2023-02-16 PROCEDURE — 80061 LIPID PANEL: CPT | Performed by: PHYSICIAN ASSISTANT

## 2023-02-16 PROCEDURE — 36415 COLL VENOUS BLD VENIPUNCTURE: CPT | Performed by: PHYSICIAN ASSISTANT

## 2023-02-16 PROCEDURE — 87624 HPV HI-RISK TYP POOLED RSLT: CPT | Performed by: PHYSICIAN ASSISTANT

## 2023-02-16 PROCEDURE — 80050 GENERAL HEALTH PANEL: CPT | Performed by: PHYSICIAN ASSISTANT

## 2023-02-16 PROCEDURE — G0123 SCREEN CERV/VAG THIN LAYER: HCPCS | Performed by: PHYSICIAN ASSISTANT

## 2023-02-16 RX ORDER — SUMATRIPTAN 100 MG/1
TABLET, FILM COATED ORAL
Qty: 10 TABLET | Refills: 1 | Status: SHIPPED | OUTPATIENT
Start: 2023-02-16 | End: 2024-02-22

## 2023-02-16 RX ORDER — MECLIZINE HYDROCHLORIDE 25 MG/1
25 TABLET ORAL 3 TIMES DAILY PRN
Qty: 30 TABLET | Refills: 0 | Status: SHIPPED | OUTPATIENT
Start: 2023-02-16

## 2023-02-16 RX ORDER — NEOMYCIN SULFATE, POLYMYXIN B SULFATE, AND DEXAMETHASONE 3.5; 10000; 1 MG/G; [USP'U]/G; MG/G
0.5 OINTMENT OPHTHALMIC 4 TIMES DAILY
Qty: 7 G | Refills: 1 | Status: SHIPPED | OUTPATIENT
Start: 2023-02-16 | End: 2024-02-22

## 2023-02-16 ASSESSMENT — ENCOUNTER SYMPTOMS
CONSTIPATION: 0
SORE THROAT: 0
HEADACHES: 0
ABDOMINAL PAIN: 0
HEARTBURN: 1
HEMATURIA: 0
FEVER: 0
NAUSEA: 0
DIZZINESS: 0
JOINT SWELLING: 0
WEAKNESS: 0
FREQUENCY: 0
SHORTNESS OF BREATH: 0
ARTHRALGIAS: 0
PARESTHESIAS: 0
EYE PAIN: 0
MYALGIAS: 0
NERVOUS/ANXIOUS: 1
DYSURIA: 0
PALPITATIONS: 0
COUGH: 0
HEMATOCHEZIA: 0
BREAST MASS: 0
DIARRHEA: 0
CHILLS: 0

## 2023-02-16 ASSESSMENT — ANXIETY QUESTIONNAIRES
GAD7 TOTAL SCORE: 4
2. NOT BEING ABLE TO STOP OR CONTROL WORRYING: SEVERAL DAYS
7. FEELING AFRAID AS IF SOMETHING AWFUL MIGHT HAPPEN: NOT AT ALL
4. TROUBLE RELAXING: SEVERAL DAYS
6. BECOMING EASILY ANNOYED OR IRRITABLE: NOT AT ALL
1. FEELING NERVOUS, ANXIOUS, OR ON EDGE: SEVERAL DAYS
IF YOU CHECKED OFF ANY PROBLEMS ON THIS QUESTIONNAIRE, HOW DIFFICULT HAVE THESE PROBLEMS MADE IT FOR YOU TO DO YOUR WORK, TAKE CARE OF THINGS AT HOME, OR GET ALONG WITH OTHER PEOPLE: NOT DIFFICULT AT ALL
GAD7 TOTAL SCORE: 4
3. WORRYING TOO MUCH ABOUT DIFFERENT THINGS: SEVERAL DAYS
5. BEING SO RESTLESS THAT IT IS HARD TO SIT STILL: NOT AT ALL

## 2023-02-16 ASSESSMENT — PATIENT HEALTH QUESTIONNAIRE - PHQ9: SUM OF ALL RESPONSES TO PHQ QUESTIONS 1-9: 4

## 2023-02-16 ASSESSMENT — PAIN SCALES - GENERAL: PAINLEVEL: NO PAIN (0)

## 2023-02-16 NOTE — PROGRESS NOTES
SUBJECTIVE:   CC: Mattie is an 57 year old who presents for preventive health visit.     Patient has been advised of split billing requirements and indicates understanding: Yes  Healthy Habits:     Getting at least 3 servings of Calcium per day:  Yes    Bi-annual eye exam:  Yes    Dental care twice a year:  Yes    Sleep apnea or symptoms of sleep apnea:  Daytime drowsiness and Excessive snoring    Diet:  Regular (no restrictions)    Frequency of exercise:  None    Taking medications regularly:  Yes    Medication side effects:  None    PHQ-2 Total Score: 0    Additional concerns today:  Yes              Today's PHQ-2 Score:   PHQ-2 ( 1999 Pfizer) 2/16/2023   Q1: Little interest or pleasure in doing things 0   Q2: Feeling down, depressed or hopeless 0   PHQ-2 Score 0   PHQ-2 Total Score (12-17 Years)- Positive if 3 or more points; Administer PHQ-A if positive -   Q1: Little interest or pleasure in doing things Not at all   Q2: Feeling down, depressed or hopeless Not at all   PHQ-2 Score 0       Have you ever done Advance Care Planning? (For example, a Health Directive, POLST, or a discussion with a medical provider or your loved ones about your wishes): No, advance care planning information given to patient to review.  Patient declined advance care planning discussion at this time.    Social History     Tobacco Use     Smoking status: Never     Smokeless tobacco: Never     Tobacco comments:     no passive exposure   Substance Use Topics     Alcohol use: No         Alcohol Use 2/16/2023   Prescreen: >3 drinks/day or >7 drinks/week? No   Prescreen: >3 drinks/day or >7 drinks/week? -       Reviewed orders with patient.  Reviewed health maintenance and updated orders accordingly - Yes  Lab work is in process  Labs reviewed in EPIC  BP Readings from Last 3 Encounters:   02/16/23 98/70   06/21/22 132/54   12/16/21 104/60    Wt Readings from Last 3 Encounters:   02/16/23 68.9 kg (152 lb)   06/21/22 70.3 kg (155 lb)    12/16/21 69.9 kg (154 lb)                  Patient Active Problem List   Diagnosis     Major depressive disorder, single episode, mild (H)     Left Thyroid nodules     hypothyroidism     Perennial allergic rhinitis     Chronic maxillary sinusitis     Chronic ethmoidal sinusitis     Nasal turbinate hypertrophy     ACP (advance care planning)     Scleritis and episcleritis of both eyes     Meniere's disease, bilateral     Past Surgical History:   Procedure Laterality Date     NO HISTORY OF SURGERY         Social History     Tobacco Use     Smoking status: Never     Smokeless tobacco: Never     Tobacco comments:     no passive exposure   Substance Use Topics     Alcohol use: No     Family History   Problem Relation Age of Onset     Breast Cancer Mother 82     Cancer Father         brain; cause of death     Myocardial Infarction Paternal Grandfather      Myocardial Infarction Maternal Grandmother      Thyroid Cancer Cousin      Hypertension Other         family history     Sleep Apnea Other         family history     Seasonal/Environmental Allergies Other         family history     Snoring Other         family history     Breast Cancer Paternal Aunt 80         Current Outpatient Medications   Medication Sig Dispense Refill     cetirizine (ZYRTEC) 10 MG tablet Take 10 mg by mouth daily       meclizine (ANTIVERT) 25 MG tablet Take 1 tablet (25 mg) by mouth 3 times daily as needed for dizziness 30 tablet 0     neomycin-polymyxin-dexamethasone (MAXITROL) 3.5-51738-9.1 ophthalmic ointment Place 0.5 inches into both eyes 4 times daily 7 g 1     SUMAtriptan (IMITREX) 100 MG tablet TAKE ONE TABLET BY MOUTH AS NEEDED 10 tablet 1     thyroid (NP THYROID) 30 MG tablet Take 1 tablet (30 mg) by mouth daily 30 tablet 0     No Known Allergies  Recent Labs   Lab Test 12/16/21  1004 07/30/20  1027 07/10/19  0812   * 137* 135*   HDL 81 73 75   TRIG 165* 128 134   ALT 32 27 24   CR 0.80 0.84 0.90   GFRESTIMATED 83 79 72    GFRESTBLACK  --  >90 84   POTASSIUM 3.8 4.2 3.8   TSH 0.98 1.87  --         Breast Cancer Screening:  Any new diagnosis of family breast, ovarian, or bowel cancer? No    FHS-7:   Breast CA Risk Assessment (FHS-7) 2022   Did any of your first-degree relatives have breast or ovarian cancer? Yes Yes   Did any of your relatives have bilateral breast cancer? No No   Did any man in your family have breast cancer? No No   Did any woman in your family have breast and ovarian cancer? No No   Did any woman in your family have breast cancer before age 50 y? No No   Do you have 2 or more relatives with breast and/or ovarian cancer? Yes Yes   Do you have 2 or more relatives with breast and/or bowel cancer? Yes No         Pertinent mammograms are reviewed under the imaging tab.    History of abnormal Pap smear:   Last 3 Pap and HPV Results:   PAP / HPV Latest Ref Rng & Units 2019   PAP (Historical) - NIL NIL   HPV16 NEG:Negative Negative -   HPV18 NEG:Negative Negative -   HRHPV NEG:Negative Negative -     PAP / HPV Latest Ref Rng & Units 2019   PAP (Historical) - NIL NIL   HPV16 NEG:Negative Negative -   HPV18 NEG:Negative Negative -   HRHPV NEG:Negative Negative -     Reviewed and updated as needed this visit by clinical staff   Tobacco  Allergies  Meds              Reviewed and updated as needed this visit by Provider                 Past Medical History:   Diagnosis Date     Major depressive disorder, single episode, mild (H) 2011     Thyroid disease       Past Surgical History:   Procedure Laterality Date     NO HISTORY OF SURGERY       OB History    Para Term  AB Living   0 0 0 0 0 0   SAB IAB Ectopic Multiple Live Births   0 0 0 0 0       Review of Systems   Constitutional: Negative for chills and fever.   HENT: Negative for congestion, ear pain, hearing loss and sore throat.    Eyes: Negative for pain and visual disturbance.   Respiratory: Negative for  cough and shortness of breath.    Cardiovascular: Negative for chest pain, palpitations and peripheral edema.   Gastrointestinal: Positive for heartburn. Negative for abdominal pain, constipation, diarrhea, hematochezia and nausea.   Breasts:  Negative for tenderness, breast mass and discharge.   Genitourinary: Negative for dysuria, frequency, genital sores, hematuria, pelvic pain, urgency, vaginal bleeding and vaginal discharge.   Musculoskeletal: Negative for arthralgias, joint swelling and myalgias.   Skin: Negative for rash.   Neurological: Negative for dizziness, weakness, headaches and paresthesias.   Psychiatric/Behavioral: Negative for mood changes. The patient is nervous/anxious.           OBJECTIVE:   BP 98/70 (BP Location: Right arm, Patient Position: Sitting, Cuff Size: Adult Regular)   Pulse 69   Temp 97.9  F (36.6  C) (Tympanic)   Resp 18   Wt 68.9 kg (152 lb)   LMP 05/15/2015   SpO2 98%   BMI 28.72 kg/m    Physical Exam  GENERAL APPEARANCE: healthy, alert and no distress  EYES: Eyes grossly normal to inspection, PERRL and conjunctivae and sclerae normal  HENT: ear canals and TM's normal, nose and mouth without ulcers or lesions, oropharynx clear and oral mucous membranes moist  NECK: no adenopathy, no asymmetry, masses, or scars and thyroid normal to palpation  RESP: lungs clear to auscultation - no rales, rhonchi or wheezes  BREAST: normal without masses, tenderness or nipple discharge and no palpable axillary masses or adenopathy  CV: regular rate and rhythm, normal S1 S2, no S3 or S4, no murmur, click or rub, no peripheral edema and peripheral pulses strong  ABDOMEN: soft, nontender, no hepatosplenomegaly, no masses and bowel sounds normal   (female): normal female external genitalia, normal urethral meatus, vaginal mucosal atrophy noted, normal cervix, adnexae, and uterus without masses or abnormal discharge  MS: no musculoskeletal defects are noted and gait is age appropriate without  "ataxia  SKIN: no suspicious lesions or rashes  NEURO: Normal strength and tone, sensory exam grossly normal, mentation intact and speech normal  PSYCH: mentation appears normal and affect normal/bright    Diagnostic Test Results:  Labs reviewed in Epic  No results found for this or any previous visit (from the past 24 hour(s)).    ASSESSMENT/PLAN:       ICD-10-CM    1. Well woman exam with routine gynecological exam  Z01.419 Pap Screen with HPV - recommended age 30 - 65 years     Lipid Profile (Chol, Trig, HDL, LDL calc)     CBC with platelets and differential     Comprehensive metabolic panel (BMP + Alb, Alk Phos, ALT, AST, Total. Bili, TP)     UA reflex to Microscopic and Culture - [place]      2. Screen for colon cancer  Z12.11 COLOGUARD([employer])      3. Other specified hypothyroidism  E03.8 TSH WITH FREE T4 REFLEX      4. Intractable episodic paroxysmal hemicrania  G44.031 SUMAtriptan (IMITREX) 100 MG tablet      5. Scleritis and episcleritis of both eyes  H15.003 neomycin-polymyxin-dexamethasone (MAXITROL) 3.5-31817-0.1 ophthalmic ointment    H15.103       6. Meniere's disease, unspecified laterality  H81.09 meclizine (ANTIVERT) 25 MG tablet      7. Vertigo  R42 meclizine (ANTIVERT) 25 MG tablet      8. Encounter for screening mammogram for breast cancer  Z12.31 MA Diagnostic Bilateral w/Last           came down with a rare throat cancer.  [facility]       COUNSELING:  Reviewed preventive health counseling, as reflected in patient instructions       Regular exercise       Healthy diet/nutrition       Vision screening       Hearing screening       Immunizations    Vaccinated for:  None is up to date.              Osteoporosis prevention/bone health       Advance Care Planning      BMI:   Estimated body mass index is 28.72 kg/m  as calculated from the following:    Height as of 6/21/22: 1.549 m (5' 1\").    Weight as of this " encounter: 68.9 kg (152 lb).         She reports that she has never smoked. She has never used smokeless tobacco.      MUKESH Leon  [facility]

## 2023-02-21 DIAGNOSIS — E04.1 THYROID NODULE: ICD-10-CM

## 2023-02-21 LAB
BKR LAB AP GYN ADEQUACY: NORMAL
BKR LAB AP GYN INTERPRETATION: NORMAL
BKR LAB AP HPV REFLEX: NORMAL
BKR LAB AP LMP: NORMAL
BKR LAB AP PREVIOUS ABNORMAL: NORMAL
PATH REPORT.COMMENTS IMP SPEC: NORMAL
PATH REPORT.COMMENTS IMP SPEC: NORMAL
PATH REPORT.RELEVANT HX SPEC: NORMAL

## 2023-02-22 ENCOUNTER — MYC REFILL (OUTPATIENT)
Dept: FAMILY MEDICINE | Facility: OTHER | Age: 58
End: 2023-02-22

## 2023-02-22 DIAGNOSIS — E04.1 THYROID NODULE: ICD-10-CM

## 2023-02-24 LAB
HUMAN PAPILLOMA VIRUS 16 DNA: NEGATIVE
HUMAN PAPILLOMA VIRUS 18 DNA: NEGATIVE
HUMAN PAPILLOMA VIRUS FINAL DIAGNOSIS: NORMAL
HUMAN PAPILLOMA VIRUS OTHER HR: NEGATIVE

## 2023-02-28 RX ORDER — THYROID 30 MG/1
30 TABLET ORAL DAILY
Qty: 30 TABLET | Refills: 3 | Status: SHIPPED | OUTPATIENT
Start: 2023-02-28 | End: 2023-07-07

## 2023-02-28 RX ORDER — LEVOTHYROXINE, LIOTHYRONINE 19; 4.5 UG/1; UG/1
TABLET ORAL
Qty: 30 TABLET | Refills: 0 | OUTPATIENT
Start: 2023-02-28

## 2023-04-10 DIAGNOSIS — Z12.11 SCREEN FOR COLON CANCER: ICD-10-CM

## 2023-05-16 LAB — NONINV COLON CA DNA+OCC BLD SCRN STL QL: NEGATIVE

## 2023-05-26 ENCOUNTER — ANCILLARY PROCEDURE (OUTPATIENT)
Dept: MAMMOGRAPHY | Facility: OTHER | Age: 58
End: 2023-05-26
Attending: PHYSICIAN ASSISTANT
Payer: COMMERCIAL

## 2023-05-26 ENCOUNTER — TELEPHONE (OUTPATIENT)
Dept: MAMMOGRAPHY | Facility: OTHER | Age: 58
End: 2023-05-26

## 2023-05-26 DIAGNOSIS — Z12.31 ENCOUNTER FOR SCREENING MAMMOGRAM FOR BREAST CANCER: ICD-10-CM

## 2023-05-26 PROCEDURE — 77063 BREAST TOMOSYNTHESIS BI: CPT | Mod: TC | Performed by: RADIOLOGY

## 2023-05-26 PROCEDURE — 77067 SCR MAMMO BI INCL CAD: CPT | Mod: TC | Performed by: RADIOLOGY

## 2023-07-06 DIAGNOSIS — E04.1 THYROID NODULE: ICD-10-CM

## 2023-07-07 RX ORDER — LEVOTHYROXINE, LIOTHYRONINE 19; 4.5 UG/1; UG/1
TABLET ORAL
Qty: 30 TABLET | Refills: 4 | Status: SHIPPED | OUTPATIENT
Start: 2023-07-07 | End: 2023-12-14

## 2023-07-07 NOTE — TELEPHONE ENCOUNTER
thyroid (NP THYROID) 30 MG tablet 30 tablet 3 2/28/2023   Last Office Visit: 02/16/2023  Future Office visit:       Routing refill request to provider for review/approval because:

## 2023-09-21 ENCOUNTER — HOSPITAL ENCOUNTER (EMERGENCY)
Facility: HOSPITAL | Age: 58
Discharge: HOME | End: 2023-09-21
Attending: NURSE PRACTITIONER | Admitting: NURSE PRACTITIONER
Payer: COMMERCIAL

## 2023-09-21 VITALS
HEART RATE: 91 BPM | OXYGEN SATURATION: 96 % | DIASTOLIC BLOOD PRESSURE: 83 MMHG | RESPIRATION RATE: 16 BRPM | BODY MASS INDEX: 28.3 KG/M2 | TEMPERATURE: 98 F | SYSTOLIC BLOOD PRESSURE: 118 MMHG | WEIGHT: 149.8 LBS

## 2023-09-21 DIAGNOSIS — J06.9 ACUTE URI: ICD-10-CM

## 2023-09-21 LAB
FLUAV RNA SPEC QL NAA+PROBE: NEGATIVE
FLUBV RNA RESP QL NAA+PROBE: NEGATIVE
RSV RNA SPEC NAA+PROBE: NEGATIVE
SARS-COV-2 RNA RESP QL NAA+PROBE: NEGATIVE

## 2023-09-21 PROCEDURE — G0463 HOSPITAL OUTPT CLINIC VISIT: HCPCS

## 2023-09-21 PROCEDURE — C9803 HOPD COVID-19 SPEC COLLECT: HCPCS

## 2023-09-21 PROCEDURE — 87637 SARSCOV2&INF A&B&RSV AMP PRB: CPT | Performed by: NURSE PRACTITIONER

## 2023-09-21 PROCEDURE — 99213 OFFICE O/P EST LOW 20 MIN: CPT | Performed by: NURSE PRACTITIONER

## 2023-09-21 ASSESSMENT — ENCOUNTER SYMPTOMS
DIARRHEA: 0
ACTIVITY CHANGE: 1
NAUSEA: 0
CHILLS: 0
RHINORRHEA: 1
SINUS PAIN: 1
SINUS PRESSURE: 1
SHORTNESS OF BREATH: 0
MYALGIAS: 1
EYES NEGATIVE: 1
VOMITING: 0
HEADACHES: 1
FEVER: 0
COUGH: 1
SORE THROAT: 0
APPETITE CHANGE: 0
FATIGUE: 0

## 2023-09-21 ASSESSMENT — ACTIVITIES OF DAILY LIVING (ADL): ADLS_ACUITY_SCORE: 35

## 2023-09-21 NOTE — ED TRIAGE NOTES
Pt presents with c/o cough  Cough, congestion/drainage, jaw pain, ears hurt/plugged,headaches, vomited 1x on Monday, sinus pressure  Denies any nd    sudafed, nyquil, ibu, cough meds

## 2023-09-21 NOTE — ED PROVIDER NOTES
History     Chief Complaint   Patient presents with    Cough    head congestion     HPI  Mattie Edwards is a 58 year old female who is accompanied per her .  She presents with a 4-day history of sinus pain and pressure, runny nose, postnasal drip, ear pain, cough, headache, and body aches.  No known sick contacts.  Works with public.  Has taken multiple medications including NyQuil, Excedrin Migraine, ibuprofen, and Sudafed.  Had Sudafed last evening with minimal relief of her symptoms.  Has not had COVID vaccination.  Non-smoker.  Denies fevers, chills, nausea, vomiting, diarrhea, and shortness of breath.     Allergies:  No Known Allergies    Problem List:    Patient Active Problem List    Diagnosis Date Noted    Scleritis and episcleritis of both eyes 05/03/2018     Priority: Medium    Meniere's disease, bilateral 05/03/2018     Priority: Medium    ACP (advance care planning) 05/01/2017     Priority: Medium     Advance Care Planning 5/1/2017: ACP Review of Chart / Resources Provided:  Reviewed chart for advance care plan.  Mattie Edwards has no plan or code status on file. Discussed available resources and provided with information.   Added by Saba Valdez        Advance Care Planning 5/4/2016: ACP Review of Chart / Resources Provided:  Reviewed chart for advance care plan.  Mattie Edwards has no plan or code status on file. Discussed available resources and provided with information. Confirmed code status reflects current choices pending further ACP discussions.  Confirmed/documented legally designated decision makers.  Added by Alisha Ag            Left Thyroid nodules 03/04/2016     Priority: Medium     1.2, 1.1 cm      hypothyroidism 03/04/2016     Priority: Medium    Perennial allergic rhinitis 03/04/2016     Priority: Medium    Chronic maxillary sinusitis 03/04/2016     Priority: Medium    Chronic ethmoidal sinusitis 03/04/2016     Priority: Medium    Nasal turbinate  hypertrophy 03/04/2016     Priority: Medium    Major depressive disorder, single episode, mild (H) 09/19/2011     Priority: Medium        Past Medical History:    Past Medical History:   Diagnosis Date    Major depressive disorder, single episode, mild (H) 9/19/2011    Thyroid disease        Past Surgical History:    Past Surgical History:   Procedure Laterality Date    NO HISTORY OF SURGERY         Family History:    Family History   Problem Relation Age of Onset    Breast Cancer Mother 82    Cancer Father         brain; cause of death    Myocardial Infarction Paternal Grandfather     Myocardial Infarction Maternal Grandmother     Thyroid Cancer Cousin     Hypertension Other         family history    Sleep Apnea Other         family history    Seasonal/Environmental Allergies Other         family history    Snoring Other         family history    Breast Cancer Paternal Aunt 80       Social History:  Marital Status:   [2]  Social History     Tobacco Use    Smoking status: Never    Smokeless tobacco: Never    Tobacco comments:     no passive exposure   Vaping Use    Vaping Use: Never used   Substance Use Topics    Alcohol use: No    Drug use: No        Medications:    cetirizine (ZYRTEC) 10 MG tablet  NP THYROID 30 MG tablet  SUMAtriptan (IMITREX) 100 MG tablet  meclizine (ANTIVERT) 25 MG tablet  neomycin-polymyxin-dexamethasone (MAXITROL) 3.5-72619-3.1 ophthalmic ointment          Review of Systems   Constitutional:  Positive for activity change. Negative for appetite change, chills, fatigue and fever.   HENT:  Positive for ear pain (plugged), postnasal drip, rhinorrhea, sinus pressure and sinus pain. Negative for sore throat.    Eyes: Negative.    Respiratory:  Positive for cough (dry). Negative for shortness of breath.    Gastrointestinal:  Negative for diarrhea, nausea and vomiting.   Genitourinary: Negative.    Musculoskeletal:  Positive for myalgias.   Skin: Negative.    Neurological:  Positive for  headaches.       Physical Exam   BP: 118/83  Pulse: 91  Temp: 98  F (36.7  C)  Resp: 16  Weight: 67.9 kg (149 lb 12.8 oz)  SpO2: 96 %      Physical Exam  Vitals and nursing note reviewed.   Constitutional:       General: She is in acute distress.      Appearance: She is normal weight.   HENT:      Head: Normocephalic.      Right Ear: Tympanic membrane and ear canal normal.      Left Ear: Tympanic membrane and ear canal normal.      Nose: Congestion and rhinorrhea present.      Right Sinus: Frontal sinus tenderness present. No maxillary sinus tenderness.      Left Sinus: Frontal sinus tenderness present. No maxillary sinus tenderness.      Mouth/Throat:      Lips: Pink.      Mouth: Mucous membranes are moist.      Pharynx: Uvula midline. No posterior oropharyngeal erythema.   Eyes:      Conjunctiva/sclera: Conjunctivae normal.   Cardiovascular:      Rate and Rhythm: Normal rate and regular rhythm.      Heart sounds: Normal heart sounds. No murmur heard.  Pulmonary:      Effort: Pulmonary effort is normal. No respiratory distress.      Breath sounds: Normal breath sounds. No wheezing.   Lymphadenopathy:      Cervical: No cervical adenopathy.   Skin:     General: Skin is warm and dry.   Neurological:      Mental Status: She is alert and oriented to person, place, and time.   Psychiatric:         Behavior: Behavior normal.         ED Course                 Procedures           No results found for this or any previous visit (from the past 24 hour(s)).    Medications - No data to display    Assessments & Plan (with Medical Decision Making)     I have reviewed the nursing notes.    I have reviewed the findings, diagnosis, plan and need for follow up with the patient.  (J06.9) Acute URI  Comment: 58 year old female who is accompanied per her .  She presents with a 4-day history of sinus pain and pressure, runny nose, postnasal drip, ear pain, cough, headache, and body aches.  No known sick contacts.  Works with  public.  Has taken multiple medications including NyQuil, Excedrin Migraine, ibuprofen, and Sudafed.  Had Sudafed last evening with minimal relief of her symptoms.  Has not had COVID vaccination.  Non-smoker.  Denies fevers, chills, nausea, vomiting, diarrhea, and shortness of breath.     MDM:NHT. Lungs CTA    Multiplex nasopharyngeal swab test results pending (negative)    Plan: Education provided for URI and COVID.   Treat symptoms conservatively with acetaminophen and  ibuprofen (if applicable) for fevers, body aches, and headaches, guaifenesin and/or honey for cough. May use chest rubs for sore throat and congestion, hot and cold liquids may help decrease sore throat and help you feel better. Increase fluids. You may utilize pseudoephedrine for congestion.  THEN/OR:  Loratadine (Claritin) or cetirizine (Zyrtec) 20 mg  daily for ten to fourteen days to see if symptoms lessen or resolve. If the medication seems to help you may take 10 mg daily on an ongoing basis.  May buy over the counter.     Return to be reevaluated by ER/UC or your primary care provider if symptoms worsen, you develop breathing difficulties, or you do not improve in a reasonable time frame. It can take several days for a cough to resolve. It can take ten to fourteen days for upper respiratory symptoms to resolve.   These discharge instructions and medications were reviewed with her and her  and understanding verbalized.    This document was prepared using a combination of typing and voice generated software.  While every attempt was made for accuracy, spelling and grammatical errors may exist.    Discharge Medication List as of 9/21/2023 11:04 AM          Final diagnoses:   Acute URI       9/21/2023   [facility]         Yuki Gutierrez, CNP  09/21/23 1146

## 2023-09-21 NOTE — DISCHARGE INSTRUCTIONS
Increase oral intake, cool mist vaporizer as needed, rest, avoid sharing utensils, practice good hand washing techniques, cover mouth when you cough and sneeze. Throw toothbursh away 24 hours after starting antibiotics.  Over the counter medications such as ibuprofen and/or acetaminophen for fever and generalized aches and pains. Ibuprofen 400 to 800 mg (2 - 4 tabs of over the counter med) every six to eight hours as needed;not to exceed maximum amount of 3200 mg in 24 hours.Tylenol 650 to 1000 mg every four to six hours as needed (not to exceed more than 4000 mg in a 24 hour period). May use interchangeably. Mucinex (guaifenesin) for cough. Chest rubs such as Benjamin's or Mentholatum may help reduce sore throat symptoms.  Chloraseptic spray for sore throat or menthol lozenges may be helpful for sore throat. Be reevaluated if symptoms persist longer than 10 - 14 days or worsen and if there is no improvement in 72 hours or worsening of symptoms.  Increase fluids. Complete all antibiotics even if feeling better. Taking antibiotics with food may decrease the stomach upset that can occur when taking antibiotics. Antibiotics frequently cause diarrhea. Probiotics or yogurt may help prevent or decrease these symptoms.     OTC decongestants (oral or topical).  Decongestants (oral or topical) cause vasoconstriction of the nasal mucosa.  We prefer oral pseudoephedrine to phenylephrine and other oral OTC nasal decongestants. Side effects of oral decongestants may include tachycardia, elevated diastolic blood pressure, and palpitations. Pseudoephedrine 30 to 60 mg every four to six hours as needed for congestion. (Maximum dose is 240 mg in 24 hours). Do not use longer than 72 hours.  THEN/OR  Loratadine (Claritin) or cetirizine (Zyrtec) 20 mg  daily for ten to fourteen days to see if symptoms lessen or resolve. If the medication seems to help you may take 10 mg daily on an ongoing basis.  May buy over the counter.    Fluids,  herbs, and foods for sore throat relief -- Adjusting the temperature and texture of foods and beverages may provide local relief of sore throat pain. While data showing benefit are quite limited, these approaches are intuitive. We typically advise these measures since they are likely to be safe with minimal adverse effect, and patients often describe relief of symptoms.  We suggest hydration with frozen (eg, ice or popsicles) or heated liquids (eg, teas, soups), rather than room temperature or refrigerated fluids in patient with significant sore throat pain. Very cold foods can have a numbing-like effect that temporarily reduces or alleviates the pain of swallowing. Ice cubes or frozen popsicles facilitate hydration; ice cream and frozen yogurt provide caloric intake.  Warm fluids and foods, including teas, soups, and soft non-irritating foods, may be better tolerated by patients with throat pain than irritating foods (eg, rough-textured or spicy foods) or fluids at room temperatures. Foods that coat the throat, including honey and hard candies, can facilitate intake of calories while temporarily relieving throat pain.

## 2023-12-13 DIAGNOSIS — E04.1 THYROID NODULE: ICD-10-CM

## 2023-12-13 NOTE — TELEPHONE ENCOUNTER
Thyroid NP      Last Written Prescription Date:  07/07/23  Last Fill Quantity: 30,   # refills: 4  Last Office Visit: 02/16/23  Future Office visit:

## 2023-12-14 RX ORDER — LEVOTHYROXINE, LIOTHYRONINE 19; 4.5 UG/1; UG/1
TABLET ORAL
Qty: 30 TABLET | Refills: 1 | Status: SHIPPED | OUTPATIENT
Start: 2023-12-14 | End: 2024-02-01

## 2024-02-22 ENCOUNTER — OFFICE VISIT (OUTPATIENT)
Dept: FAMILY MEDICINE | Facility: OTHER | Age: 59
End: 2024-02-22
Attending: PHYSICIAN ASSISTANT
Payer: COMMERCIAL

## 2024-02-22 VITALS
WEIGHT: 156.8 LBS | RESPIRATION RATE: 15 BRPM | DIASTOLIC BLOOD PRESSURE: 82 MMHG | SYSTOLIC BLOOD PRESSURE: 112 MMHG | HEART RATE: 80 BPM | TEMPERATURE: 97.8 F | HEIGHT: 61 IN | OXYGEN SATURATION: 97 % | BODY MASS INDEX: 29.6 KG/M2

## 2024-02-22 DIAGNOSIS — G44.031 INTRACTABLE EPISODIC PAROXYSMAL HEMICRANIA: ICD-10-CM

## 2024-02-22 DIAGNOSIS — Z12.31 VISIT FOR SCREENING MAMMOGRAM: ICD-10-CM

## 2024-02-22 DIAGNOSIS — H15.003 SCLERITIS AND EPISCLERITIS OF BOTH EYES: ICD-10-CM

## 2024-02-22 DIAGNOSIS — E04.1 THYROID NODULE: ICD-10-CM

## 2024-02-22 DIAGNOSIS — Z01.419 WELL WOMAN EXAM WITH ROUTINE GYNECOLOGICAL EXAM: Primary | ICD-10-CM

## 2024-02-22 DIAGNOSIS — E03.8 OTHER SPECIFIED HYPOTHYROIDISM: ICD-10-CM

## 2024-02-22 DIAGNOSIS — H15.103 SCLERITIS AND EPISCLERITIS OF BOTH EYES: ICD-10-CM

## 2024-02-22 PROCEDURE — 99396 PREV VISIT EST AGE 40-64: CPT | Performed by: PHYSICIAN ASSISTANT

## 2024-02-22 RX ORDER — SUMATRIPTAN 100 MG/1
TABLET, FILM COATED ORAL
Qty: 10 TABLET | Refills: 1 | Status: SHIPPED | OUTPATIENT
Start: 2024-02-22

## 2024-02-22 RX ORDER — NEOMYCIN SULFATE, POLYMYXIN B SULFATE, AND DEXAMETHASONE 3.5; 10000; 1 MG/G; [USP'U]/G; MG/G
0.5 OINTMENT OPHTHALMIC 4 TIMES DAILY
Qty: 7 G | Refills: 1 | Status: SHIPPED | OUTPATIENT
Start: 2024-02-22

## 2024-02-22 ASSESSMENT — ANXIETY QUESTIONNAIRES
GAD7 TOTAL SCORE: 4
1. FEELING NERVOUS, ANXIOUS, OR ON EDGE: SEVERAL DAYS
6. BECOMING EASILY ANNOYED OR IRRITABLE: SEVERAL DAYS
7. FEELING AFRAID AS IF SOMETHING AWFUL MIGHT HAPPEN: NOT AT ALL
IF YOU CHECKED OFF ANY PROBLEMS ON THIS QUESTIONNAIRE, HOW DIFFICULT HAVE THESE PROBLEMS MADE IT FOR YOU TO DO YOUR WORK, TAKE CARE OF THINGS AT HOME, OR GET ALONG WITH OTHER PEOPLE: NOT DIFFICULT AT ALL
2. NOT BEING ABLE TO STOP OR CONTROL WORRYING: SEVERAL DAYS
5. BEING SO RESTLESS THAT IT IS HARD TO SIT STILL: NOT AT ALL
7. FEELING AFRAID AS IF SOMETHING AWFUL MIGHT HAPPEN: NOT AT ALL
8. IF YOU CHECKED OFF ANY PROBLEMS, HOW DIFFICULT HAVE THESE MADE IT FOR YOU TO DO YOUR WORK, TAKE CARE OF THINGS AT HOME, OR GET ALONG WITH OTHER PEOPLE?: NOT DIFFICULT AT ALL
4. TROUBLE RELAXING: NOT AT ALL
GAD7 TOTAL SCORE: 4
3. WORRYING TOO MUCH ABOUT DIFFERENT THINGS: SEVERAL DAYS
GAD7 TOTAL SCORE: 4

## 2024-02-22 ASSESSMENT — PATIENT HEALTH QUESTIONNAIRE - PHQ9
SUM OF ALL RESPONSES TO PHQ QUESTIONS 1-9: 4
10. IF YOU CHECKED OFF ANY PROBLEMS, HOW DIFFICULT HAVE THESE PROBLEMS MADE IT FOR YOU TO DO YOUR WORK, TAKE CARE OF THINGS AT HOME, OR GET ALONG WITH OTHER PEOPLE: NOT DIFFICULT AT ALL
SUM OF ALL RESPONSES TO PHQ QUESTIONS 1-9: 4

## 2024-02-22 ASSESSMENT — PAIN SCALES - GENERAL: PAINLEVEL: NO PAIN (0)

## 2024-02-22 NOTE — PROGRESS NOTES
"  Assessment & Plan     1. Well woman exam with routine gynecological exam  She is doing well. Feeling well. Good exercise.  Good lifestyle.  Job stressful . Happy with Grand kids visits. Working quiet a few more years. Good marriage. No concers today.   - CBC with platelets and differential; Future  - Comprehensive metabolic panel (BMP + Alb, Alk Phos, ALT, AST, Total. Bili, TP); Future  - Lipid Profile (Chol, Trig, HDL, LDL calc); Future    2. Intractable episodic paroxysmal hemicrania  She is getting complete relief with her Imitrex.   - SUMAtriptan (IMITREX) 100 MG tablet; TAKE ONE TABLET BY MOUTH AS NEEDED  Dispense: 10 tablet; Refill: 1    3. Scleritis and episcleritis of both eyes  Eye treatment as needed.   - neomycin-polymyxin-dexAMETHasone (MAXITROL) 3.5-02497-6.1 ophthalmic ointment; Place 0.1429 Applications (0.5 g) into both eyes 4 times daily  Dispense: 7 g; Refill: 1    4. hypothyroidism  Checking level.   - TSH with free T4 reflex; Future    5. Thyroid nodule  Recheck US.   - US Thyroid; Future    6. Visit for screening mammogram  Exam is negative.   - MA Screen Bilateral w/Last; Future      Review of external notes as documented elsewhere in note  Ordering of each unique test  Prescription drug management  10 minutes spent by me on the date of the encounter doing chart review, history and exam, documentation and further activities per the note      BMI  Estimated body mass index is 29.63 kg/m  as calculated from the following:    Height as of this encounter: 1.549 m (5' 1\").    Weight as of this encounter: 71.1 kg (156 lb 12.8 oz).         See Patient Instructions    No follow-ups on file.    Esther Phelps is a 58 year old, presenting for the following health issues:  Recheck Medication        2/22/2024     2:21 PM   Additional Questions   Roomed by Jaylin Haddad   Accompanied by self         2/22/2024     2:21 PM   Patient Reported Additional Medications   Patient reports taking the " "following new medications none     HPI     Medication Followup of thyroid  Taking Medication as prescribed: yes  Side Effects:  None  Medication Helping Symptoms:  yes  Hyperlipidemia Follow-Up    Are you regularly taking any medication or supplement to lower your cholesterol?   No  Are you having muscle aches or other side effects that you think could be caused by your cholesterol lowering medication?  No      Review of Systems  Constitutional, HEENT, cardiovascular, pulmonary, GI, , musculoskeletal, neuro, skin, endocrine and psych systems are negative, except as otherwise noted.      Objective    /82   Pulse 80   Temp 97.8  F (36.6  C) (Tympanic)   Resp 15   Ht 1.549 m (5' 1\")   Wt 71.1 kg (156 lb 12.8 oz)   LMP 05/15/2015   SpO2 97%   BMI 29.63 kg/m    Body mass index is 29.63 kg/m .  Physical Exam   GENERAL: alert and no distress  EYES: Eyes grossly normal to inspection, PERRL and conjunctivae and sclerae normal  HENT: ear canals and TM's normal, nose and mouth without ulcers or lesions  NECK: no adenopathy, no asymmetry, masses, or scars  RESP: lungs clear to auscultation - no rales, rhonchi or wheezes  CV: regular rate and rhythm, normal S1 S2, no S3 or S4, no murmur, click or rub, no peripheral edema  ABDOMEN: soft, nontender, no hepatosplenomegaly, no masses and bowel sounds normal  MS: no gross musculoskeletal defects noted, no edema  SKIN: no suspicious lesions or rashes  NEURO: Normal strength and tone, mentation intact and speech normal  PSYCH: mentation appears normal, affect normal/bright    No results found for any visits on 02/22/24.        Signed Electronically by: MUKESH Leon    Answers submitted by the patient for this visit:  Patient Health Questionnaire (Submitted on 2/22/2024)  If you checked off any problems, how difficult have these problems made it for you to do your work, take care of things at home, or get along with other people?: Not difficult at all  PHQ9 " TOTAL SCORE: 4  MARILYN-7 (Submitted on 2/22/2024)  MARILYN 7 TOTAL SCORE: 4

## 2024-02-23 ENCOUNTER — LAB (OUTPATIENT)
Dept: LAB | Facility: OTHER | Age: 59
End: 2024-02-23
Payer: COMMERCIAL

## 2024-02-23 DIAGNOSIS — E03.8 OTHER SPECIFIED HYPOTHYROIDISM: ICD-10-CM

## 2024-02-23 DIAGNOSIS — Z01.419 WELL WOMAN EXAM WITH ROUTINE GYNECOLOGICAL EXAM: ICD-10-CM

## 2024-02-23 LAB
ALBUMIN SERPL BCG-MCNC: 4.3 G/DL (ref 3.5–5.2)
ALP SERPL-CCNC: 81 U/L (ref 40–150)
ALT SERPL W P-5'-P-CCNC: 30 U/L (ref 0–50)
ANION GAP SERPL CALCULATED.3IONS-SCNC: 9 MMOL/L (ref 7–15)
AST SERPL W P-5'-P-CCNC: 25 U/L (ref 0–45)
BASOPHILS # BLD AUTO: 0.1 10E3/UL (ref 0–0.2)
BASOPHILS NFR BLD AUTO: 1 %
BILIRUB SERPL-MCNC: 0.3 MG/DL
BUN SERPL-MCNC: 16.5 MG/DL (ref 6–20)
CALCIUM SERPL-MCNC: 9.3 MG/DL (ref 8.6–10)
CHLORIDE SERPL-SCNC: 103 MMOL/L (ref 98–107)
CHOLEST SERPL-MCNC: 241 MG/DL
CREAT SERPL-MCNC: 0.87 MG/DL (ref 0.51–0.95)
DEPRECATED HCO3 PLAS-SCNC: 28 MMOL/L (ref 22–29)
EGFRCR SERPLBLD CKD-EPI 2021: 77 ML/MIN/1.73M2
EOSINOPHIL # BLD AUTO: 0.4 10E3/UL (ref 0–0.7)
EOSINOPHIL NFR BLD AUTO: 7 %
ERYTHROCYTE [DISTWIDTH] IN BLOOD BY AUTOMATED COUNT: 13.1 % (ref 10–15)
FASTING STATUS PATIENT QL REPORTED: YES
GLUCOSE SERPL-MCNC: 89 MG/DL (ref 70–99)
HCT VFR BLD AUTO: 41.3 % (ref 35–47)
HDLC SERPL-MCNC: 77 MG/DL
HGB BLD-MCNC: 13.4 G/DL (ref 11.7–15.7)
IMM GRANULOCYTES # BLD: 0 10E3/UL
IMM GRANULOCYTES NFR BLD: 0 %
LDLC SERPL CALC-MCNC: 147 MG/DL
LYMPHOCYTES # BLD AUTO: 2 10E3/UL (ref 0.8–5.3)
LYMPHOCYTES NFR BLD AUTO: 36 %
MCH RBC QN AUTO: 28.4 PG (ref 26.5–33)
MCHC RBC AUTO-ENTMCNC: 32.4 G/DL (ref 31.5–36.5)
MCV RBC AUTO: 88 FL (ref 78–100)
MONOCYTES # BLD AUTO: 0.6 10E3/UL (ref 0–1.3)
MONOCYTES NFR BLD AUTO: 11 %
NEUTROPHILS # BLD AUTO: 2.6 10E3/UL (ref 1.6–8.3)
NEUTROPHILS NFR BLD AUTO: 45 %
NONHDLC SERPL-MCNC: 164 MG/DL
NRBC # BLD AUTO: 0 10E3/UL
NRBC BLD AUTO-RTO: 0 /100
PLATELET # BLD AUTO: 265 10E3/UL (ref 150–450)
POTASSIUM SERPL-SCNC: 4.5 MMOL/L (ref 3.4–5.3)
PROT SERPL-MCNC: 7.5 G/DL (ref 6.4–8.3)
RBC # BLD AUTO: 4.72 10E6/UL (ref 3.8–5.2)
SODIUM SERPL-SCNC: 140 MMOL/L (ref 135–145)
TRIGL SERPL-MCNC: 84 MG/DL
TSH SERPL DL<=0.005 MIU/L-ACNC: 1.36 UIU/ML (ref 0.3–4.2)
WBC # BLD AUTO: 5.6 10E3/UL (ref 4–11)

## 2024-02-23 PROCEDURE — 80050 GENERAL HEALTH PANEL: CPT

## 2024-02-23 PROCEDURE — 36415 COLL VENOUS BLD VENIPUNCTURE: CPT

## 2024-02-23 PROCEDURE — 80061 LIPID PANEL: CPT

## 2024-02-28 ENCOUNTER — HOSPITAL ENCOUNTER (OUTPATIENT)
Dept: ULTRASOUND IMAGING | Facility: HOSPITAL | Age: 59
Discharge: HOME | End: 2024-02-28
Attending: PHYSICIAN ASSISTANT | Admitting: PHYSICIAN ASSISTANT
Payer: COMMERCIAL

## 2024-02-28 DIAGNOSIS — E04.1 THYROID NODULE: ICD-10-CM

## 2024-02-28 PROCEDURE — 76536 US EXAM OF HEAD AND NECK: CPT

## 2024-03-01 NOTE — RESULT ENCOUNTER NOTE
We should watch this still , category 3 undetermined significance. Watch for changes has been 8 years and has only very minimal change. Had biopsy on one side but not he other. Recheck one year.

## 2024-04-03 DIAGNOSIS — E04.1 THYROID NODULE: ICD-10-CM

## 2024-04-03 RX ORDER — LEVOTHYROXINE, LIOTHYRONINE 19; 4.5 UG/1; UG/1
30 TABLET ORAL DAILY
Qty: 30 TABLET | Refills: 1 | Status: SHIPPED | OUTPATIENT
Start: 2024-04-03 | End: 2024-06-06

## 2024-04-03 NOTE — TELEPHONE ENCOUNTER
NP Thyroid      Last Written Prescription Date:  2/1/24  Last Fill Quantity: 30,   # refills: 1  Last Office Visit: 2/22/24  Future Office visit:       Routing refill request to provider for review/approval because:

## 2024-05-28 ENCOUNTER — ANCILLARY PROCEDURE (OUTPATIENT)
Dept: MAMMOGRAPHY | Facility: OTHER | Age: 59
End: 2024-05-28
Attending: PHYSICIAN ASSISTANT
Payer: COMMERCIAL

## 2024-05-28 ENCOUNTER — TELEPHONE (OUTPATIENT)
Dept: MAMMOGRAPHY | Facility: OTHER | Age: 59
End: 2024-05-28

## 2024-05-28 DIAGNOSIS — Z12.31 VISIT FOR SCREENING MAMMOGRAM: ICD-10-CM

## 2024-05-28 PROCEDURE — 77063 BREAST TOMOSYNTHESIS BI: CPT | Mod: TC | Performed by: RADIOLOGY

## 2024-05-28 PROCEDURE — 77067 SCR MAMMO BI INCL CAD: CPT | Mod: TC | Performed by: RADIOLOGY

## 2024-05-28 NOTE — LETTER
May 29, 2024      Mattie Edwards  [address] [place] [address]        Dear ,    We are writing to inform you of your test results.    Normal mammogram     Resulted Orders   MA Screen Bilateral w/Last    Narrative    EXAM: MA SCREENING BILATERAL W/ LAST, 5/28/2024 12:56 PM    COMPARISONS: 5/26/2023    HISTORY: Visit for screening mammogram    FINDINGS: No new dominant masses or malignant calcifications are seen    BREAST DENSITY: The breasts are heterogeneously dense which may  obscure small masses.      Impression    IMPRESSION: BI-RADS CATEGORY: 2 - Benign.    RECOMMENDED FOLLOW-UP: Routine yearly mammography beginning at age 40  or as discussed with your provider.      The images were reviewed by R2 computer aided detection.    APOLONIA NORTH MD         SYSTEM ID:  [id number]       If you have any questions or concerns, please call the clinic at the number listed above.       Sincerely,      MUKESH Wallis

## 2024-06-06 DIAGNOSIS — E04.1 THYROID NODULE: ICD-10-CM

## 2024-06-06 RX ORDER — LEVOTHYROXINE, LIOTHYRONINE 19; 4.5 UG/1; UG/1
30 TABLET ORAL DAILY
Qty: 30 TABLET | Refills: 1 | Status: SHIPPED | OUTPATIENT
Start: 2024-06-06 | End: 2024-08-06

## 2024-06-06 NOTE — TELEPHONE ENCOUNTER
THYROID NP 0.5GR (30MG) TABLETS       Last Written Prescription Date:  04/03/2024  Last Fill Quantity: 30,   # refills: 1  Last Office Visit: 02/22/2024  Future Office visit:       Routing refill request to provider for review/approval because:    Thyroid Protocol [id number]/06/2024 03:27 AM   Protocol Details Medication indicated for associated diagnosis        Kimberly Boecker, RN

## 2024-06-07 DIAGNOSIS — E04.1 THYROID NODULE: ICD-10-CM

## 2024-06-07 RX ORDER — LEVOTHYROXINE, LIOTHYRONINE 19; 4.5 UG/1; UG/1
30 TABLET ORAL DAILY
Qty: 90 TABLET | OUTPATIENT
Start: 2024-06-07

## 2024-06-18 ENCOUNTER — APPOINTMENT (OUTPATIENT)
Dept: GENERAL RADIOLOGY | Facility: HOSPITAL | Age: 59
End: 2024-06-18
Payer: COMMERCIAL

## 2024-06-18 ENCOUNTER — HOSPITAL ENCOUNTER (EMERGENCY)
Facility: HOSPITAL | Age: 59
Discharge: HOME | End: 2024-06-18
Payer: COMMERCIAL

## 2024-06-18 VITALS
SYSTOLIC BLOOD PRESSURE: 112 MMHG | HEART RATE: 77 BPM | OXYGEN SATURATION: 99 % | BODY MASS INDEX: 28.32 KG/M2 | TEMPERATURE: 98.4 F | RESPIRATION RATE: 16 BRPM | HEIGHT: 61 IN | WEIGHT: 150 LBS | DIASTOLIC BLOOD PRESSURE: 75 MMHG

## 2024-06-18 DIAGNOSIS — S82.832A CLOSED FRACTURE OF DISTAL END OF LEFT FIBULA, UNSPECIFIED FRACTURE MORPHOLOGY, INITIAL ENCOUNTER: ICD-10-CM

## 2024-06-18 PROCEDURE — 271N000006 HC CAST/SPLINT FIBERGLASS

## 2024-06-18 PROCEDURE — 999N000104 HC STATISTIC NO CHARGE

## 2024-06-18 PROCEDURE — 29515 APPLICATION SHORT LEG SPLINT: CPT

## 2024-06-18 PROCEDURE — 73590 X-RAY EXAM OF LOWER LEG: CPT | Mod: LT

## 2024-06-18 PROCEDURE — 73610 X-RAY EXAM OF ANKLE: CPT | Mod: LT

## 2024-06-18 RX ORDER — HYDROCODONE BITARTRATE AND ACETAMINOPHEN 5; 325 MG/1; MG/1
1 TABLET ORAL EVERY 6 HOURS PRN
Qty: 10 TABLET | Refills: 0 | Status: SHIPPED | OUTPATIENT
Start: 2024-06-18 | End: 2024-06-21

## 2024-06-18 ASSESSMENT — ENCOUNTER SYMPTOMS
CHILLS: 0
JOINT SWELLING: 1
NUMBNESS: 0
ARTHRALGIAS: 1
COLOR CHANGE: 1
FEVER: 0
WOUND: 0
ACTIVITY CHANGE: 1

## 2024-06-18 ASSESSMENT — ACTIVITIES OF DAILY LIVING (ADL)
ADLS_ACUITY_SCORE: 35
ADLS_ACUITY_SCORE: 35

## 2024-06-18 NOTE — ED TRIAGE NOTES
Patient presents to urgent care for a fall today on her slippery steps from the rain. Patient hurt her left ankle with pain radiating up her left leg to her knee. Pain 5/10. Patient has not taken any medication tonight.

## 2024-06-18 NOTE — DISCHARGE INSTRUCTIONS
You can take 650-1000mg of tylenol every 6 hours as needed, max of 3000mg in 24 hours and 600-800mg of ibuprofen every 8 hours as needed, max of 2400mg in 24 hours.     Norco every 6 hours as needed. Start with half.     Ice for 15-20 minutes every 2-3 hours. Please make sure to protect skin to prevent frost bite.     Return with any increased pain, numbness, or other concerns.     Follow up with orthopedics at [phone]. Call tomorrow to schedule an appt.

## 2024-06-18 NOTE — ED PROVIDER NOTES
History     Chief Complaint   Patient presents with     Fall     Leg Pain     HPI  Mattie Edwards is a 58 year old female who presents to the urgent care with left lower leg/ankle pain and swelling after a fall down outside stairs. Incident occurred around 0830 this morning. She denies hitting head. Denies numbness/tingling. Does not take blood thinners. Has not been able to ambulate due to pain. No previous injuries of left lower leg or ankle. No recent OTC medications.     Allergies:  Allergies   Allergen Reactions     Seasonal Allergies Other (See Comments)     Hay Fever       Problem List:    Patient Active Problem List    Diagnosis Date Noted     Scleritis and episcleritis of both eyes 05/03/2018     Priority: Medium     Meniere's disease, bilateral 05/03/2018     Priority: Medium     ACP (advance care planning) 05/01/2017     Priority: Medium     Advance Care Planning 5/1/2017: ACP Review of Chart / Resources Provided:  Reviewed chart for advance care plan.  Mattie Edwards has no plan or code status on file. Discussed available resources and provided with information.   Added by Saba Valdez        Advance Care Planning 5/4/2016: ACP Review of Chart / Resources Provided:  Reviewed chart for advance care plan.  Mattie Edwards has no plan or code status on file. Discussed available resources and provided with information. Confirmed code status reflects current choices pending further ACP discussions.  Confirmed/documented legally designated decision makers.  Added by Alisha Ag             Left Thyroid nodules 03/04/2016     Priority: Medium     1.2, 1.1 cm       hypothyroidism 03/04/2016     Priority: Medium     Perennial allergic rhinitis 03/04/2016     Priority: Medium     Chronic maxillary sinusitis 03/04/2016     Priority: Medium     Chronic ethmoidal sinusitis 03/04/2016     Priority: Medium     Nasal turbinate hypertrophy 03/04/2016     Priority: Medium     Major depressive  "disorder, single episode, mild (H24) 09/19/2011     Priority: Medium        Past Medical History:    Past Medical History:   Diagnosis Date     Major depressive disorder, single episode, mild (H24) 9/19/2011     Thyroid disease        Past Surgical History:    Past Surgical History:   Procedure Laterality Date     NO HISTORY OF SURGERY         Family History:    Family History   Problem Relation Age of Onset     Breast Cancer Mother 82     Cancer Father         brain; cause of death     Myocardial Infarction Paternal Grandfather      Myocardial Infarction Maternal Grandmother      Thyroid Cancer Cousin      Hypertension Other         family history     Sleep Apnea Other         family history     Seasonal/Environmental Allergies Other         family history     Snoring Other         family history     Breast Cancer Paternal Aunt 80       Social History:  Marital Status:   [2]  Social History     Tobacco Use     Smoking status: Never     Smokeless tobacco: Never     Tobacco comments:     no passive exposure   Vaping Use     Vaping status: Never Used   Substance Use Topics     Alcohol use: No     Drug use: No        Medications:    cetirizine (ZYRTEC) 10 MG tablet  HYDROcodone-acetaminophen (NORCO) 5-325 MG tablet  meclizine (ANTIVERT) 25 MG tablet  neomycin-polymyxin-dexAMETHasone (MAXITROL) 3.5-44877-6.1 ophthalmic ointment  NP THYROID 30 MG tablet  SUMAtriptan (IMITREX) 100 MG tablet          Review of Systems   Constitutional:  Positive for activity change. Negative for chills and fever.   Musculoskeletal:  Positive for arthralgias, gait problem and joint swelling.   Skin:  Positive for color change. Negative for pallor, rash and wound.   Neurological:  Negative for numbness.   All other systems reviewed and are negative.      Physical Exam   BP: 112/75  Pulse: 77  Temp: 98.4  F (36.9  C)  Resp: 16  Height: 154.9 cm (5' 1\")  Weight: 68 kg (150 lb)  SpO2: 99 %      Physical Exam  Vitals and nursing note " reviewed.   Constitutional:       General: She is not in acute distress.     Appearance: Normal appearance. She is not ill-appearing or toxic-appearing.   Musculoskeletal:         General: Swelling, tenderness, deformity and signs of injury present.      Right lower leg: Deformity, tenderness and bony tenderness present. No edema.      Left lower leg: No edema.      Right ankle: Swelling, deformity and ecchymosis present. No lacerations. Tenderness present over the lateral malleolus. Decreased range of motion. Normal pulse.      Right foot: Normal range of motion and normal capillary refill. No swelling, tenderness or bony tenderness.   Skin:     General: Skin is warm and dry.      Capillary Refill: Capillary refill takes less than 2 seconds.      Coloration: Skin is not pale.      Findings: Bruising present. No erythema.   Neurological:      General: No focal deficit present.      Mental Status: She is alert and oriented to person, place, and time.   Psychiatric:         Mood and Affect: Mood normal.         Behavior: Behavior normal.         Thought Content: Thought content normal.         Judgment: Judgment normal.       ED Course        [facility]    Splint Application    Date/Time: 6/18/2024 6:23 PM    Performed by: Suzette Juarez NP  Authorized by: Suzette Jaurez NP    Risks, benefits and alternatives discussed.      PRE-PROCEDURE DETAILS     Sensation:  Normal    Skin color:  Pink, warm    PROCEDURE DETAILS     Laterality:  Left    Location:  Leg    Leg:  L lower leg    Cast type:  Short leg    Splint type:  Ankle stirrup    Supplies:  Cotton padding, elastic bandage and Ortho-Glass    POST PROCEDURE DETAILS     Pain:  Improved    Sensation:  Normal    Skin color:  Pink, warm         Results for orders placed or performed during the hospital encounter of 06/18/24 (from the past 24 hour(s))   XR Ankle Left G/E 3 Views    Narrative    XR ANKLE LEFT G/E 3 VIEWS    HISTORY: 58 years Female pain and  swelling    COMPARISON: None    TECHNIQUE: 3 views left ankle    FINDINGS: There is an oblique distal fibular fracture. There is mild  lateral displacement but no significant angulation.    There is widening of the medial ankle mortise. The medial malleolus is  intact. No evidence of posterior malleolar fracture.      Impression    IMPRESSION: Mildly displaced oblique fracture of the distal fibula.  There is widening of the medial ankle mortise suggesting medial  ligamentous injuries.    SYLVIE CARLISLE MD         SYSTEM ID:  [id number]   XR Tibia and Fibula Left 2 Views    Narrative    XR TIBIA AND FIBULA LEFT 2 VIEWS    HISTORY: 58 years Female pain and swelling    COMPARISON: None    TECHNIQUE: Left tibia and fibula 2 views    FINDINGS: There is an oblique fracture of the distal fibula  approximately 1 cm to 2 cm above the ankle mortise. There is slight  lateral displacement of the distal fracture fragment. There is lateral  soft tissue edema.        Impression    IMPRESSION: Mildly displaced oblique distal fibular fracture.    SYLVIE CARLISLE MD         SYSTEM ID:  [id number]       Medications - No data to display    Assessments & Plan (with Medical Decision Making)     I have reviewed the nursing notes.    I have reviewed the findings, diagnosis, plan and need for follow up with the patient.  Mattie Edwards is a 58 year old female who presents to the urgent care with left lower leg/ankle pain and swelling after a fall down outside stairs. Incident occurred around 0830 this morning. She denies hitting head. Denies numbness/tingling. Does not take blood thinners. Has not been able to ambulate due to pain. No previous injuries of left lower leg or ankle. No recent OTC medications.     MDM: vital signs normal, afebrile. Strong pulses to left lower extremity. Cap refill within 2 seconds. No pallor or erythema. Bruising and swelling with deformity to lateral lower leg. XR reviewed with mildly displaced oblique  distal fibular fracture. There is widening of the medial ankle mortise suggesting medial  ligamentous injuries, per radiologist. Posterior splint with stirrup placed. CMS intact and cap refill within 2 seconds pre and post application. Crutches provided. Short fill of norco prescribed. Ortho referral placed. Supportive measures and return precautions discussed. She is in agreement with plan.     (I07.239A) Closed fracture of distal end of left fibula, unspecified fracture morphology, initial encounter  Plan: Orthopedic  Referral, Crutches Order         for DME - ONLY FOR DME   You can take 650-1000mg of tylenol every 6 hours as needed, max of 3000mg in 24 hours and 600-800mg of ibuprofen every 8 hours as needed, max of 2400mg in 24 hours.     Norco every 6 hours as needed. Start with half.     Ice for 15-20 minutes every 2-3 hours. Please make sure to protect skin to prevent frost bite.     Return with any increased pain, numbness, or other concerns.     Follow up with orthopedics at [phone]. Call tomorrow to schedule an appt. Understanding verbalized.          New Prescriptions    HYDROCODONE-ACETAMINOPHEN (NORCO) 5-325 MG TABLET    Take 1 tablet by mouth every 6 hours as needed for severe pain       Final diagnoses:   Closed fracture of distal end of left fibula, unspecified fracture morphology, initial encounter       6/18/2024   [facility]       Suzette Juarez NP  06/18/24 [id number]

## 2024-06-21 ENCOUNTER — TELEPHONE (OUTPATIENT)
Dept: FAMILY MEDICINE | Facility: OTHER | Age: 59
End: 2024-06-21

## 2024-06-21 DIAGNOSIS — S82.832D CLOSED FRACTURE OF DISTAL END OF LEFT FIBULA WITH ROUTINE HEALING, UNSPECIFIED FRACTURE MORPHOLOGY, SUBSEQUENT ENCOUNTER: Primary | ICD-10-CM

## 2024-06-21 NOTE — TELEPHONE ENCOUNTER
2:50 PM    Reason for Call: Phone Call    Description: Patient called stating she is having emergency surgery on 6/24 and needs Tracey estrella to put in a referral in order for her to have the surgery. Patient would like a call back.     Was an appointment offered for this call? No  If yes : Appointment type              Date    Preferred method for responding to this message: Telephone Call  What is your phone number ?    If we cannot reach you directly, may we leave a detailed response at the number you provided? Yes    Can this message wait until your PCP/provider returns, if available today? Not applicable    Roberta Reinoso

## 2024-06-21 NOTE — TELEPHONE ENCOUNTER
RN CC received a transferred message from patient.  She states that she was into ER and saw OA.  They requested that she have surgery on Monday at [facility] and she is needing a referral.  States she doesn't need a preop.    RN CC called [facility] and they state above and that patient needing a gen surgery referral faxed to [phone].  RN CC talked with PCP and pended referral for her to sign today.  RN CC called and updated patient that PCP will look to sign and we will get that off to above.  She is thankful.  Christine Richards, LUCIA  Care Coordination

## 2024-08-06 DIAGNOSIS — E04.1 THYROID NODULE: ICD-10-CM

## 2024-08-06 RX ORDER — LEVOTHYROXINE, LIOTHYRONINE 19; 4.5 UG/1; UG/1
30 TABLET ORAL DAILY
Qty: 30 TABLET | Refills: 1 | Status: SHIPPED | OUTPATIENT
Start: 2024-08-06

## 2024-08-06 NOTE — TELEPHONE ENCOUNTER
Thyroid Protocol Zaiboe2008/06/2024 03:27 AM   Protocol Details Medication indicated for associated diagnosis        
NP Thyroid      Last Written Prescription Date:  06/06/24  Last Fill Quantity: 30,   # refills: 1  Last Office Visit: 02/22/24  Future Office visit:         
yes...

## 2024-10-21 DIAGNOSIS — E04.1 THYROID NODULE: ICD-10-CM

## 2024-10-21 NOTE — TELEPHONE ENCOUNTER
NP THYROID 30 MG tablet 30 tablet 1 8/6/2024   Last Office Visit: 02/22/24     Future Office visit:       Routing refill request to provider for review/approval because:

## 2024-10-22 RX ORDER — LEVOTHYROXINE, LIOTHYRONINE 19; 4.5 UG/1; UG/1
30 TABLET ORAL DAILY
Qty: 30 TABLET | Refills: 1 | Status: SHIPPED | OUTPATIENT
Start: 2024-10-22

## 2025-02-06 DIAGNOSIS — E04.1 THYROID NODULE: ICD-10-CM

## 2025-02-07 NOTE — TELEPHONE ENCOUNTER
thyroid (NP THYROID) 30 MG tablet 30 tablet 1 12/27/2024     Last Office Visit: 2/22/24     Future Office visit:       Routing refill request to provider for review/approval because:

## 2025-02-10 RX ORDER — THYROID 30 MG/1
30 TABLET ORAL DAILY
Qty: 30 TABLET | Refills: 1 | Status: SHIPPED | OUTPATIENT
Start: 2025-02-10

## 2025-02-20 SDOH — HEALTH STABILITY: PHYSICAL HEALTH: ON AVERAGE, HOW MANY DAYS PER WEEK DO YOU ENGAGE IN MODERATE TO STRENUOUS EXERCISE (LIKE A BRISK WALK)?: 1 DAY

## 2025-02-20 SDOH — HEALTH STABILITY: PHYSICAL HEALTH: ON AVERAGE, HOW MANY MINUTES DO YOU ENGAGE IN EXERCISE AT THIS LEVEL?: 30 MIN

## 2025-02-20 ASSESSMENT — SOCIAL DETERMINANTS OF HEALTH (SDOH): HOW OFTEN DO YOU GET TOGETHER WITH FRIENDS OR RELATIVES?: NEVER

## 2025-02-24 ENCOUNTER — OFFICE VISIT (OUTPATIENT)
Dept: FAMILY MEDICINE | Facility: OTHER | Age: 60
End: 2025-02-24
Attending: PHYSICIAN ASSISTANT
Payer: COMMERCIAL

## 2025-02-24 VITALS
BODY MASS INDEX: 29.79 KG/M2 | RESPIRATION RATE: 16 BRPM | WEIGHT: 157.8 LBS | SYSTOLIC BLOOD PRESSURE: 121 MMHG | HEIGHT: 61 IN | OXYGEN SATURATION: 97 % | HEART RATE: 73 BPM | DIASTOLIC BLOOD PRESSURE: 78 MMHG | TEMPERATURE: 97.5 F

## 2025-02-24 DIAGNOSIS — Z01.419 WELL WOMAN EXAM: ICD-10-CM

## 2025-02-24 DIAGNOSIS — R11.11 VOMITING WITHOUT NAUSEA, UNSPECIFIED VOMITING TYPE: Primary | ICD-10-CM

## 2025-02-24 DIAGNOSIS — E03.8 OTHER SPECIFIED HYPOTHYROIDISM: ICD-10-CM

## 2025-02-24 DIAGNOSIS — Z12.31 VISIT FOR SCREENING MAMMOGRAM: ICD-10-CM

## 2025-02-24 DIAGNOSIS — E04.1 THYROID NODULE: ICD-10-CM

## 2025-02-24 LAB
ALBUMIN SERPL BCG-MCNC: 4.5 G/DL (ref 3.5–5.2)
ALBUMIN UR-MCNC: NEGATIVE MG/DL
ALP SERPL-CCNC: 85 U/L (ref 40–150)
ALT SERPL W P-5'-P-CCNC: 26 U/L (ref 0–50)
ANION GAP SERPL CALCULATED.3IONS-SCNC: 10 MMOL/L (ref 7–15)
APPEARANCE UR: CLEAR
AST SERPL W P-5'-P-CCNC: 23 U/L (ref 0–45)
BASOPHILS # BLD AUTO: 0.1 10E3/UL (ref 0–0.2)
BASOPHILS NFR BLD AUTO: 1 %
BILIRUB SERPL-MCNC: 0.4 MG/DL
BILIRUB UR QL STRIP: NEGATIVE
BUN SERPL-MCNC: 17.1 MG/DL (ref 8–23)
CALCIUM SERPL-MCNC: 9.2 MG/DL (ref 8.8–10.4)
CHLORIDE SERPL-SCNC: 101 MMOL/L (ref 98–107)
CHOLEST SERPL-MCNC: 234 MG/DL
COLOR UR AUTO: NORMAL
CREAT SERPL-MCNC: 0.93 MG/DL (ref 0.51–0.95)
EGFRCR SERPLBLD CKD-EPI 2021: 70 ML/MIN/1.73M2
EOSINOPHIL # BLD AUTO: 0.6 10E3/UL (ref 0–0.7)
EOSINOPHIL NFR BLD AUTO: 9 %
ERYTHROCYTE [DISTWIDTH] IN BLOOD BY AUTOMATED COUNT: 13.2 % (ref 10–15)
FASTING STATUS PATIENT QL REPORTED: YES
FASTING STATUS PATIENT QL REPORTED: YES
GLUCOSE SERPL-MCNC: 101 MG/DL (ref 70–99)
GLUCOSE UR STRIP-MCNC: NEGATIVE MG/DL
HCO3 SERPL-SCNC: 26 MMOL/L (ref 22–29)
HCT VFR BLD AUTO: 42 % (ref 35–47)
HDLC SERPL-MCNC: 83 MG/DL
HGB BLD-MCNC: 14.2 G/DL (ref 11.7–15.7)
HGB UR QL STRIP: NEGATIVE
IMM GRANULOCYTES # BLD: 0 10E3/UL
IMM GRANULOCYTES NFR BLD: 0 %
KETONES UR STRIP-MCNC: NEGATIVE MG/DL
LDLC SERPL CALC-MCNC: 133 MG/DL
LEUKOCYTE ESTERASE UR QL STRIP: NEGATIVE
LYMPHOCYTES # BLD AUTO: 2 10E3/UL (ref 0.8–5.3)
LYMPHOCYTES NFR BLD AUTO: 32 %
MCH RBC QN AUTO: 28.7 PG (ref 26.5–33)
MCHC RBC AUTO-ENTMCNC: 33.8 G/DL (ref 31.5–36.5)
MCV RBC AUTO: 85 FL (ref 78–100)
MONOCYTES # BLD AUTO: 0.6 10E3/UL (ref 0–1.3)
MONOCYTES NFR BLD AUTO: 10 %
NEUTROPHILS # BLD AUTO: 2.9 10E3/UL (ref 1.6–8.3)
NEUTROPHILS NFR BLD AUTO: 47 %
NITRATE UR QL: NEGATIVE
NONHDLC SERPL-MCNC: 151 MG/DL
NRBC # BLD AUTO: 0 10E3/UL
NRBC BLD AUTO-RTO: 0 /100
PH UR STRIP: 6 [PH] (ref 4.7–8)
PLATELET # BLD AUTO: 275 10E3/UL (ref 150–450)
POTASSIUM SERPL-SCNC: 4 MMOL/L (ref 3.4–5.3)
PROT SERPL-MCNC: 8 G/DL (ref 6.4–8.3)
RBC # BLD AUTO: 4.94 10E6/UL (ref 3.8–5.2)
RBC URINE: <1 /HPF
SODIUM SERPL-SCNC: 137 MMOL/L (ref 135–145)
SP GR UR STRIP: 1.01 (ref 1–1.03)
SQUAMOUS EPITHELIAL: 0 /HPF
TRIGL SERPL-MCNC: 88 MG/DL
TSH SERPL DL<=0.005 MIU/L-ACNC: 1.33 UIU/ML (ref 0.3–4.2)
UROBILINOGEN UR STRIP-MCNC: NORMAL MG/DL
WBC # BLD AUTO: 6.1 10E3/UL (ref 4–11)
WBC URINE: <1 /HPF

## 2025-02-24 PROCEDURE — 81001 URINALYSIS AUTO W/SCOPE: CPT | Performed by: PHYSICIAN ASSISTANT

## 2025-02-24 PROCEDURE — 36415 COLL VENOUS BLD VENIPUNCTURE: CPT | Performed by: PHYSICIAN ASSISTANT

## 2025-02-24 PROCEDURE — 80050 GENERAL HEALTH PANEL: CPT | Performed by: PHYSICIAN ASSISTANT

## 2025-02-24 PROCEDURE — 80061 LIPID PANEL: CPT | Performed by: PHYSICIAN ASSISTANT

## 2025-02-24 RX ORDER — OMEPRAZOLE 20 MG/1
20 CAPSULE, DELAYED RELEASE ORAL DAILY
Qty: 30 CAPSULE | Refills: 1 | Status: SHIPPED | OUTPATIENT
Start: 2025-02-24

## 2025-02-24 ASSESSMENT — ANXIETY QUESTIONNAIRES
8. IF YOU CHECKED OFF ANY PROBLEMS, HOW DIFFICULT HAVE THESE MADE IT FOR YOU TO DO YOUR WORK, TAKE CARE OF THINGS AT HOME, OR GET ALONG WITH OTHER PEOPLE?: NOT DIFFICULT AT ALL
1. FEELING NERVOUS, ANXIOUS, OR ON EDGE: SEVERAL DAYS
7. FEELING AFRAID AS IF SOMETHING AWFUL MIGHT HAPPEN: SEVERAL DAYS
5. BEING SO RESTLESS THAT IT IS HARD TO SIT STILL: NOT AT ALL
GAD7 TOTAL SCORE: 4
6. BECOMING EASILY ANNOYED OR IRRITABLE: SEVERAL DAYS
3. WORRYING TOO MUCH ABOUT DIFFERENT THINGS: NOT AT ALL
IF YOU CHECKED OFF ANY PROBLEMS ON THIS QUESTIONNAIRE, HOW DIFFICULT HAVE THESE PROBLEMS MADE IT FOR YOU TO DO YOUR WORK, TAKE CARE OF THINGS AT HOME, OR GET ALONG WITH OTHER PEOPLE: NOT DIFFICULT AT ALL
2. NOT BEING ABLE TO STOP OR CONTROL WORRYING: SEVERAL DAYS
4. TROUBLE RELAXING: NOT AT ALL
GAD7 TOTAL SCORE: 4
7. FEELING AFRAID AS IF SOMETHING AWFUL MIGHT HAPPEN: SEVERAL DAYS
GAD7 TOTAL SCORE: 4

## 2025-02-24 ASSESSMENT — PATIENT HEALTH QUESTIONNAIRE - PHQ9
10. IF YOU CHECKED OFF ANY PROBLEMS, HOW DIFFICULT HAVE THESE PROBLEMS MADE IT FOR YOU TO DO YOUR WORK, TAKE CARE OF THINGS AT HOME, OR GET ALONG WITH OTHER PEOPLE: NOT DIFFICULT AT ALL
SUM OF ALL RESPONSES TO PHQ QUESTIONS 1-9: 6
SUM OF ALL RESPONSES TO PHQ QUESTIONS 1-9: 6

## 2025-02-24 NOTE — PROGRESS NOTES
"Preventive Care Visit  [facility]  MUKESH Leon, Family Medicine  Feb 24, 2025      Assessment & Plan     Vomiting without nausea, unspecified vomiting type  We will refill her omeprazole. Should have upper GI work up and make a referral for a scope.   - omeprazole (PRILOSEC) 20 MG DR capsule; Take 1 capsule (20 mg) by mouth daily.    Well woman exam  Up to date on mammogram and pap smear. Given immunizations. Labs are due and will be back to have   - PNEUMOCOCCAL 20 VALENT CONJUGATE (PREVNAR 20)  - Lipid Profile (Chol, Trig, HDL, LDL calc); Future  - CBC with platelets and differential; Future  - Comprehensive metabolic panel (BMP + Alb, Alk Phos, ALT, AST, Total. Bili, TP); Future  - UA Macroscopic with reflex to Microscopic and Culture; Future    hypothyroidism  Due for a recheck and a check of her nodule as it had changed a slight amount last year.   - TSH with free T4 reflex; Future    Thyroid nodule  As above.   - US Thyroid; Future    Visit for screening mammogram  Will get this done when due.   - MA Screen Bilateral w/Last; Future    Patient has been advised of split billing requirements and indicates understanding: Yes        BMI  Estimated body mass index is 29.82 kg/m  as calculated from the following:    Height as of this encounter: 1.549 m (5' 1\").    Weight as of this encounter: 71.6 kg (157 lb 12.8 oz).       Counseling  Appropriate preventive services were addressed with this patient via screening, questionnaire, or discussion as appropriate for fall prevention, nutrition, physical activity, Tobacco-use cessation, social engagement, weight loss and cognition.  Checklist reviewing preventive services available has been given to the patient.  Reviewed patient's diet, addressing concerns and/or questions.   She is at risk for lack of exercise and has been provided with information to increase physical activity for the benefit of her well-being.   Patient is at risk for social " isolation and has been provided with information about the benefit of social connection.   She is at risk for psychosocial distress and has been provided with information to reduce risk.   The patient's PHQ-9 score is consistent with mild depression. She was provided with information regarding depression.       See Patient Instructions    No follow-ups on file.    Esther Phelps is a 59 year old, presenting for the following:  Physical        2/24/2025    11:23 AM   Additional Questions   Roomed by francisco DÍAZ    Hypothyroidism Follow-up    Since last visit, patient describes the following symptoms: Weight stable, no hair loss, no skin changes, no constipation, no loose stools  Mom has hx of breast cancer. At age 84. Had partial mastectomy and radiation and chemo. Almost 5 years out.   Health Care Directive  Patient does not have a Health Care Directive: Discussed advance care planning with patient; however, patient declined at this time.      2/20/2025   General Health   How would you rate your overall physical health? Good   Feel stress (tense, anxious, or unable to sleep) Rather much   (!) STRESS CONCERN      2/20/2025   Nutrition   Three or more servings of calcium each day? (!) NO   Diet: Regular (no restrictions)   How many servings of fruit and vegetables per day? (!) 0-1   How many sweetened beverages each day? 0-1         2/20/2025   Exercise   Days per week of moderate/strenous exercise 1 day   Average minutes spent exercising at this level 30 min   (!) EXERCISE CONCERN      2/20/2025   Social Factors   Frequency of gathering with friends or relatives Never   Worry food won't last until get money to buy more Patient declined   Food not last or not have enough money for food? Patient declined   Do you have housing? (Housing is defined as stable permanent housing and does not include staying ouside in a car, in a tent, in an abandoned building, in an overnight shelter, or couch-surfing.)  Patient declined   Are you worried about losing your housing? Patient declined   Lack of transportation? Patient declined   Unable to get utilities (heat,electricity)? Patient declined   (!) SOCIAL CONNECTIONS CONCERN      2/20/2025   Fall Risk   Fallen 2 or more times in the past year? No   Trouble with walking or balance? No          2/20/2025   Dental   Dentist two times every year? Yes          Today's PHQ-9 Score:       2/24/2025    10:33 AM   PHQ-9 SCORE   PHQ-9 Total Score MyChart 6 (Mild depression)   PHQ-9 Total Score 6        Patient-reported         2/20/2025   Substance Use   Alcohol more than 3/day or more than 7/wk Not Applicable   Do you use any other substances recreationally? No     Social History     Tobacco Use    Smoking status: Never    Smokeless tobacco: Never    Tobacco comments:     no passive exposure   Vaping Use    Vaping status: Never Used   Substance Use Topics    Alcohol use: No    Drug use: No           5/28/2024   LAST FHS-7 RESULTS   Any relative bilateral breast cancer No   Any male have breast cancer No   Any ONE woman have BOTH breast AND ovarian cancer No   Any woman with breast cancer before 50yrs No   2 or more relatives with breast AND/OR ovarian cancer Yes   2 or more relatives with breast AND/OR bowel cancer No        Mammogram Screening - Annual screen due to greater than 20% lifetime risk as estimated by Breast Cancer Risk Calculator        2/20/2025   STI Screening   New sexual partner(s) since last STI/HIV test? No     History of abnormal Pap smear: No - age 30- 64 PAP with HPV every 5 years recommended        Latest Ref Rng & Units 2/16/2023    10:55 AM 6/27/2019     1:45 PM 1/27/2016    12:00 AM   PAP / HPV   PAP  Negative for Intraepithelial Lesion or Malignancy (NILM)      PAP (Historical)   NIL  NIL    HPV 16 DNA Negative Negative  Negative     HPV 18 DNA Negative Negative  Negative     Other HR HPV Negative Negative  Negative       ASCVD Risk   The 10-year  ASCVD risk score (Rehana MENDOZA, et al., 2019) is: 2.4%    Values used to calculate the score:      Age: 59 years      Sex: Female      Is Non- : No      Diabetic: No      Tobacco smoker: No      Systolic Blood Pressure: 121 mmHg      Is BP treated: No      HDL Cholesterol: 77 mg/dL      Total Cholesterol: 241 mg/dL           Reviewed and updated as needed this visit by Provider                    Past Medical History:   Diagnosis Date    Major depressive disorder, single episode, mild 2011    Thyroid disease      Past Surgical History:   Procedure Laterality Date    NO HISTORY OF SURGERY      ORTHOPEDIC SURGERY  2024    ORIF surgery for broken ankle     OB History    Para Term  AB Living   0 0 0 0 0 0   SAB IAB Ectopic Multiple Live Births   0 0 0 0 0     BP Readings from Last 3 Encounters:   25 121/78   24 112/75   24 112/82    Wt Readings from Last 3 Encounters:   25 71.6 kg (157 lb 12.8 oz)   24 68 kg (150 lb)   24 71.1 kg (156 lb 12.8 oz)                  Patient Active Problem List   Diagnosis    Major depressive disorder, single episode, mild    Left Thyroid nodules    hypothyroidism    Perennial allergic rhinitis    Chronic maxillary sinusitis    Chronic ethmoidal sinusitis    Nasal turbinate hypertrophy    ACP (advance care planning)    Scleritis and episcleritis of both eyes    Meniere's disease, bilateral     Past Surgical History:   Procedure Laterality Date    NO HISTORY OF SURGERY      ORTHOPEDIC SURGERY  2024    ORIF surgery for broken ankle       Social History     Tobacco Use    Smoking status: Never    Smokeless tobacco: Never    Tobacco comments:     no passive exposure   Substance Use Topics    Alcohol use: No     Family History   Problem Relation Age of Onset    Breast Cancer Mother 82    Cancer Father         brain; cause of death    Other Cancer Father     Myocardial Infarction Paternal Grandfather      Myocardial Infarction Maternal Grandmother     Thyroid Cancer Cousin     Hypertension Other         family history    Sleep Apnea Other         family history    Seasonal/Environmental Allergies Other         family history    Snoring Other         family history    Breast Cancer Paternal Aunt 80    Breast Cancer Other          Current Outpatient Medications   Medication Sig Dispense Refill    cetirizine (ZYRTEC) 10 MG tablet Take 10 mg by mouth daily      meclizine (ANTIVERT) 25 MG tablet Take 1 tablet (25 mg) by mouth 3 times daily as needed for dizziness 30 tablet 0    neomycin-polymyxin-dexAMETHasone (MAXITROL) 3.5-75513-6.1 ophthalmic ointment Place 0.1429 Applications (0.5 g) into both eyes 4 times daily 7 g 1    omeprazole (PRILOSEC) 20 MG DR capsule Take 1 capsule (20 mg) by mouth daily. 30 capsule 1    SUMAtriptan (IMITREX) 100 MG tablet TAKE ONE TABLET BY MOUTH AS NEEDED 10 tablet 1    thyroid (NP THYROID) 30 MG tablet Take 1 tablet (30 mg) by mouth daily. 30 tablet 1     Allergies   Allergen Reactions    Seasonal Allergies Other (See Comments)     Hay Fever     Recent Labs   Lab Test 02/23/24  0903 02/16/23  1143 12/16/21  1004 12/16/21  1004 07/30/20  1027 07/10/19  0812   * 140*  --  110* 137* 135*   HDL 77 73  --  81 73 75   TRIG 84 113  --  165* 128 134   ALT 30 24  --  32 27 24   CR 0.87 0.85   < > 0.80 0.84 0.90   GFRESTIMATED 77 79   < > 83 79 72   GFRESTBLACK  --   --   --   --  >90 84   POTASSIUM 4.5 3.9  --  3.8 4.2 3.8   TSH 1.36 1.10  --  0.98 1.87  --     < > = values in this interval not displayed.          Review of Systems  Constitutional, neuro, ENT, endocrine, pulmonary, cardiac, gastrointestinal, genitourinary, musculoskeletal, integument and psychiatric systems are negative, except as otherwise noted.     Objective    Exam  /78 (BP Location: Left arm, Patient Position: Sitting, Cuff Size: Adult Regular)   Pulse 73   Temp 97.5  F (36.4  C) (Tympanic)   Resp 16   Ht  "1.549 m (5' 1\")   Wt 71.6 kg (157 lb 12.8 oz)   LMP 05/15/2015   SpO2 97%   BMI 29.82 kg/m     Estimated body mass index is 29.82 kg/m  as calculated from the following:    Height as of this encounter: 1.549 m (5' 1\").    Weight as of this encounter: 71.6 kg (157 lb 12.8 oz).    Physical Exam  GENERAL: alert and no distress  EYES: Eyes grossly normal to inspection, PERRL and conjunctivae and sclerae normal  HENT: ear canals and TM's normal, nose and mouth without ulcers or lesions  NECK: no adenopathy, no asymmetry, masses, or scars  RESP: lungs clear to auscultation - no rales, rhonchi or wheezes  BREAST: normal without masses, tenderness or nipple discharge and no palpable axillary masses or adenopathy  CV: regular rate and rhythm, normal S1 S2, no S3 or S4, no murmur, click or rub, no peripheral edema  ABDOMEN: soft, nontender, no hepatosplenomegaly, no masses and bowel sounds normal  MS: no gross musculoskeletal defects noted, no edema  SKIN: no suspicious lesions or rashes  NEURO: Normal strength and tone, mentation intact and speech normal  PSYCH: mentation appears normal, affect normal/bright  LYMPH: no cervical, supraclavicular, axillary, or inguinal adenopathy        Signed Electronically by: MUKESH Leon    Answers submitted by the patient for this visit:  Patient Health Questionnaire (Submitted on 2/24/2025)  If you checked off any problems, how difficult have these problems made it for you to do your work, take care of things at home, or get along with other people?: Not difficult at all  PHQ9 TOTAL SCORE: 6  Patient Health Questionnaire (G7) (Submitted on 2/24/2025)  MARILYN 7 TOTAL SCORE: 4    "

## 2025-02-26 ENCOUNTER — HOSPITAL ENCOUNTER (OUTPATIENT)
Dept: ULTRASOUND IMAGING | Facility: HOSPITAL | Age: 60
Discharge: HOME | End: 2025-02-26
Attending: PHYSICIAN ASSISTANT
Payer: COMMERCIAL

## 2025-02-26 DIAGNOSIS — E04.1 THYROID NODULE: ICD-10-CM

## 2025-02-26 PROCEDURE — 76536 US EXAM OF HEAD AND NECK: CPT

## 2025-02-28 ENCOUNTER — OFFICE VISIT (OUTPATIENT)
Dept: SURGERY | Facility: OTHER | Age: 60
End: 2025-02-28
Attending: PHYSICIAN ASSISTANT
Payer: COMMERCIAL

## 2025-02-28 ENCOUNTER — HOSPITAL ENCOUNTER (OUTPATIENT)
Facility: HOSPITAL | Age: 60
End: 2025-02-28
Attending: SURGERY | Admitting: SURGERY
Payer: COMMERCIAL

## 2025-02-28 VITALS
RESPIRATION RATE: 16 BRPM | TEMPERATURE: 97.1 F | DIASTOLIC BLOOD PRESSURE: 64 MMHG | SYSTOLIC BLOOD PRESSURE: 110 MMHG | HEART RATE: 84 BPM | OXYGEN SATURATION: 97 %

## 2025-02-28 DIAGNOSIS — R11.11 VOMITING WITHOUT NAUSEA, UNSPECIFIED VOMITING TYPE: Primary | ICD-10-CM

## 2025-02-28 ASSESSMENT — PAIN SCALES - GENERAL: PAINLEVEL_OUTOF10: NO PAIN (0)

## 2025-02-28 NOTE — PROGRESS NOTES
CLINIC NOTE - CONSULT  2/28/2025    Patient:Mattie Edwards  Referring Physician:  Tracey MAYORGA    Reason for Referral: Dysphagia    HPI:  Mattie Edwards is a very pleasant 59 year old healthy female referred to [facility] for evaluation of dysphagia.      She states that for the last 2-3 years she has had intermittent cervical dysphagia worse with solid food.  This seems to happen once or twice every few weeks.  She denies abdominal pain.  She denies radiation or neck surgery.  She has seen ENT in the past for allergies but has never been evaluated for dysphagia from this service.  She has two thyroid nodules in the left upper and lower pole.  These were going to be follower with ultrasound.  She does not smoke nor take blood thinners.  She has never had a colonoscopy but screens for colon cancer with cologuard.  She denies family history of colon cancer.    Past Medical History:  Past Medical History:   Diagnosis Date    Major depressive disorder, single episode, mild 9/19/2011    Thyroid disease        Past Surgical History:  Past Surgical History:   Procedure Laterality Date    NO HISTORY OF SURGERY      ORTHOPEDIC SURGERY  6/24/2024    ORIF surgery for broken ankle       Family History History:  Family History   Problem Relation Age of Onset    Breast Cancer Mother 82    Cancer Father         brain; cause of death    Other Cancer Father     Myocardial Infarction Paternal Grandfather     Myocardial Infarction Maternal Grandmother     Thyroid Cancer Cousin     Hypertension Other         family history    Sleep Apnea Other         family history    Seasonal/Environmental Allergies Other         family history    Snoring Other         family history    Breast Cancer Paternal Aunt 80    Breast Cancer Other        History of Tobacco Use:  History   Smoking Status    Never   Smokeless Tobacco    Never       Current Medications:  Current Outpatient Medications   Medication Sig Dispense Refill     cetirizine (ZYRTEC) 10 MG tablet Take 10 mg by mouth daily      meclizine (ANTIVERT) 25 MG tablet Take 1 tablet (25 mg) by mouth 3 times daily as needed for dizziness 30 tablet 0    neomycin-polymyxin-dexAMETHasone (MAXITROL) 3.5-94737-0.1 ophthalmic ointment Place 0.1429 Applications (0.5 g) into both eyes 4 times daily 7 g 1    omeprazole (PRILOSEC) 20 MG DR capsule Take 1 capsule (20 mg) by mouth daily. 30 capsule 1    SUMAtriptan (IMITREX) 100 MG tablet TAKE ONE TABLET BY MOUTH AS NEEDED 10 tablet 1    thyroid (NP THYROID) 30 MG tablet Take 1 tablet (30 mg) by mouth daily. 30 tablet 1       Allergies:  Allergies   Allergen Reactions    Seasonal Allergies Other (See Comments)     Hay Fever       ROS:  Pertinent items are noted in HPI.    PHYSICAL EXAM:     Vital signs: /64 (BP Location: Left arm, Cuff Size: Adult Regular)   Pulse 84   Temp 97.1  F (36.2  C) (Tympanic)   Resp 16   LMP 05/15/2015   SpO2 97%    Weight: [unfilled]   BMI: There is no height or weight on file to calculate BMI.   General: No apparent distress   Skin: No rashes or readily discernible lesions   Neck: Neck supple, symmetric and without palpable adenopathy or masses   Lungs: Nonlabored breathing on room air.  Clear to auscultation bilaterally   CV: Regular rate and rhythm on auscultation.   Abdominal: Soft, nontender, nondistended   Extremities: No peripheral edema    Labs:  Reviewed    Imaging:  Reviewed    ASSESSMENT & Plan:  Mattie Edwards is a very pleasant 59 year old female presenting with cervical dysphagia.    Discussed with patient risks and benefits of EGD with possible balloon dilation including bleeding and perforation.  Patient demonstrated understanding and wishes to proceed.  If nothing is found on EGD, will refer to ENT.  It may be the case that her thyroid nodule is symptomatic.    Also discussed benefits of colonoscopy versus cologuard including removal of polyps that can turn into cancer with colonoscopy.   Patient is not interested in colonoscopy at this time and would prefer to continue with cologuard.

## 2025-02-28 NOTE — PATIENT INSTRUCTIONS
Thank you for allowing Dr. Laboy and our surgical team to participate in your care. Please call our health unit coordinator at [phone] with scheduling questions or the nurse at [phone] with any other questions or concerns.      You have been scheduled for: Upper endoscopy with  on 4/7/25.   Please see handout for additional instruction.  You will not need a pre-operative appointment with your primary care provider.  You may call [phone] or [phone] with any questions.

## 2025-03-06 DIAGNOSIS — E78.5 HYPERLIPIDEMIA LDL GOAL <100: Primary | ICD-10-CM

## 2025-03-06 RX ORDER — ROSUVASTATIN CALCIUM 10 MG/1
10 TABLET, COATED ORAL DAILY
Qty: 90 TABLET | Refills: 1 | Status: SHIPPED | OUTPATIENT
Start: 2025-03-06

## 2025-04-14 DIAGNOSIS — E04.1 THYROID NODULE: ICD-10-CM

## 2025-04-14 NOTE — TELEPHONE ENCOUNTER
Thyroid Protocol Failed04/14/2025 03:25 AM   Protocol Details Medication indicated for associated diagnosis

## 2025-04-14 NOTE — TELEPHONE ENCOUNTER
thyroid (NP THYROID) 30 MG tablet 30 tablet 1 2/10/2025     Last Office Visit: 2/24/2025  Future Office visit:       Routing refill request to provider for review/approval because:

## 2025-04-22 ENCOUNTER — ANESTHESIA EVENT (OUTPATIENT)
Dept: SURGERY | Facility: HOSPITAL | Age: 60
End: 2025-04-22
Payer: COMMERCIAL

## 2025-04-22 NOTE — ANESTHESIA PREPROCEDURE EVALUATION
Anesthesia Pre-Procedure Evaluation    Patient: Mattie Edwards   MRN: [id number] : 1965        Procedure : Procedure(s):  ESOPHAGOGASTRODUODENOSCOPY          Past Medical History:   Diagnosis Date    Major depressive disorder, single episode, mild 2011    Thyroid disease       Past Surgical History:   Procedure Laterality Date    NO HISTORY OF SURGERY      ORTHOPEDIC SURGERY  2024    ORIF surgery for broken ankle      Allergies   Allergen Reactions    Seasonal Allergies Other (See Comments)     Hay Fever      Social History     Tobacco Use    Smoking status: Never    Smokeless tobacco: Never    Tobacco comments:     no passive exposure   Substance Use Topics    Alcohol use: No      Wt Readings from Last 1 Encounters:   25 71.6 kg (157 lb 12.8 oz)        Anesthesia Evaluation   Pt has had prior anesthetic. Type: General.        ROS/MED HX  ENT/Pulmonary: Comment: Chronic sinusitis    (+)     JENNIFER risk factors, snores loudly,     allergic rhinitis,                             Neurologic: Comment: Meniere's disease, bilateral      Cardiovascular:  - neg cardiovascular ROS   (+)  - -   -  - -                                 Previous cardiac testing   Echo: Date: Results:    Stress Test:  Date: Results:    ECG Reviewed:  Date:  Results:  HR 56, sinus nathan  Cath:  Date: Results:      METS/Exercise Tolerance: >4 METS    Hematologic:  - neg hematologic  ROS     Musculoskeletal:  - neg musculoskeletal ROS     GI/Hepatic: Comment: Vomiting without nausea (reason for 2025 procedure) - on PPI (no noted hx GERD)  dysphagia    (+) GERD, Asymptomatic on medication,                  Renal/Genitourinary:  - neg Renal ROS     Endo:     (+)          thyroid problem, hypothyroidism left thyroid nodule - TSH 25 normal,           Psychiatric/Substance Use:     (+) psychiatric history depression       Infectious Disease:  - neg infectious disease ROS     Malignancy:  - neg malignancy ROS     Other:  -  "neg other ROS          Physical Exam    Airway        Mallampati: IV   TM distance: > 3 FB   Neck ROM: full   Mouth opening: > 3 cm    Respiratory Devices and Support         Dental       (+) Minor Abnormalities - some fillings, tiny chips      Cardiovascular   cardiovascular exam normal          Pulmonary   pulmonary exam normal            OUTSIDE LABS:  CBC:   Lab Results   Component Value Date    WBC 6.1 02/24/2025    WBC 5.6 02/23/2024    HGB 14.2 02/24/2025    HGB 13.4 02/23/2024    HCT 42.0 02/24/2025    HCT 41.3 02/23/2024     02/24/2025     02/23/2024     BMP:   Lab Results   Component Value Date     02/24/2025     02/23/2024    POTASSIUM 4.0 02/24/2025    POTASSIUM 4.5 02/23/2024    CHLORIDE 101 02/24/2025    CHLORIDE 103 02/23/2024    CO2 26 02/24/2025    CO2 28 02/23/2024    BUN 17.1 02/24/2025    BUN 16.5 02/23/2024    CR 0.93 02/24/2025    CR 0.87 02/23/2024     (H) 02/24/2025    GLC 89 02/23/2024     COAGS: No results found for: \"PTT\", \"INR\", \"FIBR\"  POC: No results found for: \"BGM\", \"HCG\", \"HCGS\"  HEPATIC:   Lab Results   Component Value Date    ALBUMIN 4.5 02/24/2025    PROTTOTAL 8.0 02/24/2025    ALT 26 02/24/2025    AST 23 02/24/2025    ALKPHOS 85 02/24/2025    BILITOTAL 0.4 02/24/2025     OTHER:   Lab Results   Component Value Date    TERI 9.2 02/24/2025    TSH 1.33 02/24/2025    T4 0.70 (L) 06/23/2016    CRP <2.9 05/03/2018    SED 13 05/03/2018       Anesthesia Plan    ASA Status:  2    NPO Status:  NPO Appropriate    Anesthesia Type: MAC.     - Reason for MAC: straight local not clinically adequate   Induction: Intravenous, Propofol.   Maintenance: Balanced.        Consents    Anesthesia Plan(s) and associated risks, benefits, and realistic alternatives discussed. Questions answered and patient/representative(s) expressed understanding.     - Discussed: Risks, Benefits and Alternatives for BOTH SEDATION and the PROCEDURE were discussed     - Discussed with:  " Patient      - Extended Intubation/Ventilatory Support Discussed: No.      - Patient is DNR/DNI Status: No     Use of blood products discussed: No .     Postoperative Care            Comments:    Other Comments: Risks and benefits of MAC anesthetic discussed including dental damage, aspiration, loss of airway, conversion to general anesthetic, CV complications, MI, stroke, death. Pt wishes to proceed. Chart reviewed hl - same day surg            DOLORES Herrera CNP    Clinically Significant Risk Factors Present on Admission

## 2025-04-24 RX ORDER — THYROID 30 MG/1
30 TABLET ORAL DAILY
Qty: 90 TABLET | Refills: 1 | Status: SHIPPED | OUTPATIENT
Start: 2025-04-24

## 2025-04-27 DIAGNOSIS — R11.11 VOMITING WITHOUT NAUSEA, UNSPECIFIED VOMITING TYPE: ICD-10-CM

## 2025-04-28 ENCOUNTER — ANESTHESIA (OUTPATIENT)
Dept: SURGERY | Facility: HOSPITAL | Age: 60
End: 2025-04-28
Payer: COMMERCIAL

## 2025-04-28 ENCOUNTER — HOSPITAL ENCOUNTER (OUTPATIENT)
Facility: HOSPITAL | Age: 60
Discharge: HOME | End: 2025-04-28
Attending: SURGERY | Admitting: SURGERY
Payer: COMMERCIAL

## 2025-04-28 VITALS
DIASTOLIC BLOOD PRESSURE: 75 MMHG | BODY MASS INDEX: 28.52 KG/M2 | HEIGHT: 62 IN | HEART RATE: 77 BPM | SYSTOLIC BLOOD PRESSURE: 130 MMHG | WEIGHT: 155 LBS | OXYGEN SATURATION: 97 % | TEMPERATURE: 96.7 F | RESPIRATION RATE: 20 BRPM

## 2025-04-28 PROCEDURE — 88305 TISSUE EXAM BY PATHOLOGIST: CPT | Mod: 26 | Performed by: PATHOLOGY

## 2025-04-28 PROCEDURE — 360N000075 HC SURGERY LEVEL 2, PER MIN: Performed by: SURGERY

## 2025-04-28 PROCEDURE — 272N000001 HC OR GENERAL SUPPLY STERILE: Performed by: SURGERY

## 2025-04-28 PROCEDURE — 999N000141 HC STATISTIC PRE-PROCEDURE NURSING ASSESSMENT: Performed by: SURGERY

## 2025-04-28 PROCEDURE — 250N000009 HC RX 250: Performed by: NURSE ANESTHETIST, CERTIFIED REGISTERED

## 2025-04-28 PROCEDURE — 250N000011 HC RX IP 250 OP 636: Performed by: NURSE ANESTHETIST, CERTIFIED REGISTERED

## 2025-04-28 PROCEDURE — 43239 EGD BIOPSY SINGLE/MULTIPLE: CPT | Performed by: SURGERY

## 2025-04-28 PROCEDURE — 370N000017 HC ANESTHESIA TECHNICAL FEE, PER MIN: Performed by: SURGERY

## 2025-04-28 PROCEDURE — 88305 TISSUE EXAM BY PATHOLOGIST: CPT | Mod: TC | Performed by: SURGERY

## 2025-04-28 PROCEDURE — 710N000012 HC RECOVERY PHASE 2, PER MINUTE: Performed by: SURGERY

## 2025-04-28 PROCEDURE — 258N000003 HC RX IP 258 OP 636: Performed by: NURSE PRACTITIONER

## 2025-04-28 RX ORDER — NALOXONE HYDROCHLORIDE 0.4 MG/ML
0.1 INJECTION, SOLUTION INTRAMUSCULAR; INTRAVENOUS; SUBCUTANEOUS
Status: DISCONTINUED | OUTPATIENT
Start: 2025-04-28 | End: 2025-04-28 | Stop reason: HOSPADM

## 2025-04-28 RX ORDER — SODIUM CHLORIDE, SODIUM LACTATE, POTASSIUM CHLORIDE, CALCIUM CHLORIDE 600; 310; 30; 20 MG/100ML; MG/100ML; MG/100ML; MG/100ML
INJECTION, SOLUTION INTRAVENOUS CONTINUOUS
Status: DISCONTINUED | OUTPATIENT
Start: 2025-04-28 | End: 2025-04-28 | Stop reason: HOSPADM

## 2025-04-28 RX ORDER — OMEPRAZOLE 20 MG/1
20 CAPSULE, DELAYED RELEASE ORAL DAILY
Qty: 90 CAPSULE | Refills: 0 | Status: SHIPPED | OUTPATIENT
Start: 2025-04-28

## 2025-04-28 RX ORDER — ONDANSETRON 4 MG/1
4 TABLET, ORALLY DISINTEGRATING ORAL EVERY 30 MIN PRN
Status: DISCONTINUED | OUTPATIENT
Start: 2025-04-28 | End: 2025-04-28 | Stop reason: HOSPADM

## 2025-04-28 RX ORDER — DEXAMETHASONE SODIUM PHOSPHATE 10 MG/ML
4 INJECTION, SOLUTION INTRAMUSCULAR; INTRAVENOUS
Status: DISCONTINUED | OUTPATIENT
Start: 2025-04-28 | End: 2025-04-28 | Stop reason: HOSPADM

## 2025-04-28 RX ORDER — ONDANSETRON 2 MG/ML
4 INJECTION INTRAMUSCULAR; INTRAVENOUS EVERY 30 MIN PRN
Status: DISCONTINUED | OUTPATIENT
Start: 2025-04-28 | End: 2025-04-28 | Stop reason: HOSPADM

## 2025-04-28 RX ORDER — LIDOCAINE 40 MG/G
CREAM TOPICAL
Status: DISCONTINUED | OUTPATIENT
Start: 2025-04-28 | End: 2025-04-28 | Stop reason: HOSPADM

## 2025-04-28 RX ORDER — PROPOFOL 10 MG/ML
INJECTION, EMULSION INTRAVENOUS PRN
Status: DISCONTINUED | OUTPATIENT
Start: 2025-04-28 | End: 2025-04-28

## 2025-04-28 RX ORDER — LIDOCAINE HYDROCHLORIDE 20 MG/ML
INJECTION, SOLUTION INFILTRATION; PERINEURAL PRN
Status: DISCONTINUED | OUTPATIENT
Start: 2025-04-28 | End: 2025-04-28

## 2025-04-28 RX ADMIN — PROPOFOL 50 MG: 10 INJECTION, EMULSION INTRAVENOUS at 11:26

## 2025-04-28 RX ADMIN — PROPOFOL 100 MG: 10 INJECTION, EMULSION INTRAVENOUS at 11:23

## 2025-04-28 RX ADMIN — LIDOCAINE HYDROCHLORIDE 60 MG: 20 INJECTION, SOLUTION INFILTRATION; PERINEURAL at 11:23

## 2025-04-28 RX ADMIN — PROPOFOL 30 MG: 10 INJECTION, EMULSION INTRAVENOUS at 11:29

## 2025-04-28 RX ADMIN — SODIUM CHLORIDE, SODIUM LACTATE, POTASSIUM CHLORIDE, AND CALCIUM CHLORIDE: .6; .31; .03; .02 INJECTION, SOLUTION INTRAVENOUS at 11:11

## 2025-04-28 ASSESSMENT — ACTIVITIES OF DAILY LIVING (ADL)
ADLS_ACUITY_SCORE: 41

## 2025-04-28 NOTE — OR NURSING
Patient and responsible adult given discharge instructions with no questions regarding instructions. Flakito score 20/20. Pain level 0/10.  Discharged from unit via ambulation. Patient discharged to home with spouse.

## 2025-04-28 NOTE — H&P
"EGD H&P  4/28/2025    Patient:Mattie Edwards  Referring Physician:  Tracey MAYORGA    Reason for Referral: DYsphagia    HPI:  Mattie Edwards is a very pleasant 59 year old female referred to [facility] for evaluation of dysphagia.  She also endorses GERD controlled on prilosec.  Never had chest or abdominal surgery.  No history of blood thinners.  Denies chest pain/shortness of breath with ambulation or climbing stairs.    Past Medical History:  Past Medical History:   Diagnosis Date    Major depressive disorder, single episode, mild 9/19/2011    Thyroid disease        Past Surgical History:  Past Surgical History:   Procedure Laterality Date    NO HISTORY OF SURGERY      ORTHOPEDIC SURGERY  6/24/2024    ORIF surgery for broken ankle       Family History History:  Family History   Problem Relation Age of Onset    Breast Cancer Mother 82    Cancer Father         brain; cause of death    Other Cancer Father     Myocardial Infarction Paternal Grandfather     Myocardial Infarction Maternal Grandmother     Thyroid Cancer Cousin     Hypertension Other         family history    Sleep Apnea Other         family history    Seasonal/Environmental Allergies Other         family history    Snoring Other         family history    Breast Cancer Paternal Aunt 80    Breast Cancer Other        History of Tobacco Use:  History   Smoking Status    Never   Smokeless Tobacco    Never       Current Medications:  No current outpatient medications on file.       Allergies:  Allergies   Allergen Reactions    Seasonal Allergies Other (See Comments)     Hay Fever       ROS:  Pertinent items are noted in HPI.    PHYSICAL EXAM:     Vital signs: Temp 98.9  F (37.2  C) (Tympanic)   Resp 16   Ht 1.575 m (5' 2\")   Wt 70.3 kg (155 lb)   LMP 05/15/2015   BMI 28.35 kg/m     Weight: [unfilled]   BMI: Body mass index is 28.35 kg/m .   General: No apparent distress   Skin: No rashes or readily discernible lesions   Neck: Neck " supple, symmetric and without palpable adenopathy or masses   Lungs: Nonlabored breathing on room air.  Clear to auscultation bilaterally   CV: Regular rate and rhythm on auscultation.   Abdominal: Soft, nontender, nondistended   Extremities: No peripheral edema    Labs:  Reviewed    Imaging:  N/a    ASSESSMENT:  Mattie Edwards is a very pleasant 59 year old female presenting with dysphagia in the setting of GERD.  Discussed risks and benefits of EGD with possible balloon dilation including bleeding and perforation. Patient demonstrated understanding and wishes to proceed.    PLAN:  Proceed with EGD with possible balloon dilation.

## 2025-04-28 NOTE — DISCHARGE INSTRUCTIONS
Thank you for allowing [facility] to care for you today.  If you have any questions and/or concerns please reach out to us Monday-Friday 0800-4:00 PM at [phone].  After hours please go to the Urgent Care and/or Emergency Room.  If you feel like it is an emergency call 911.

## 2025-04-28 NOTE — TELEPHONE ENCOUNTER
PPI Protocol [id number]/27/2025 06:56 AM   Protocol Details Medication indicated for associated diagnosis

## 2025-04-28 NOTE — ANESTHESIA POSTPROCEDURE EVALUATION
Patient: Mattie Edwards    Procedure: Procedure(s):  ESOPHAGOGASTRODUODENOSCOPY with biopsy       Anesthesia Type:  MAC    Note:  Disposition: Outpatient   Postop Pain Control: Uneventful            Sign Out: Well controlled pain   PONV: No   Neuro/Psych: Uneventful            Sign Out: Acceptable/Baseline neuro status   Airway/Respiratory: Uneventful            Sign Out: Acceptable/Baseline resp. status   CV/Hemodynamics: Uneventful            Sign Out: Acceptable CV status; No obvious hypovolemia; No obvious fluid overload   Other NRE: NONE   DID A NON-ROUTINE EVENT OCCUR?        Last vitals:  Vitals Value Taken Time   /75 04/28/25 1200   Temp 96.7  F (35.9  C) 04/28/25 1200   Pulse 65 04/28/25 1155   Resp 20 04/28/25 1200   SpO2 99 % 04/28/25 1156   Vitals shown include unfiled device data.    Electronically Signed By: DOLORES Hatfield CRNA  April 28, 2025  12:29 PM

## 2025-04-28 NOTE — ANESTHESIA CARE TRANSFER NOTE
Patient: Mattie Edwards    Procedure: Procedure(s):  ESOPHAGOGASTRODUODENOSCOPY with biopsy       Diagnosis: Vomiting without nausea, unspecified vomiting type [R11.11]  Diagnosis Additional Information: No value filed.    Anesthesia Type:   MAC     Note:    Oropharynx: oropharynx clear of all foreign objects and spontaneously breathing  Level of Consciousness: drowsy  Oxygen Supplementation: room air    Independent Airway: airway patency satisfactory and stable  Dentition: dentition unchanged  Vital Signs Stable: post-procedure vital signs reviewed and stable  Report to RN Given: handoff report given  Patient transferred to: Phase II    Handoff Report: Identifed the Patient, Identified the Reponsible Provider, Reviewed the pertinent medical history, Discussed the surgical course, Reviewed Intra-OP anesthesia mangement and issues during anesthesia, Set expectations for post-procedure period and Allowed opportunity for questions and acknowledgement of understanding  Vitals:  Vitals Value Taken Time   BP     Temp     Pulse     Resp     SpO2         Electronically Signed By: DOLORES Torres CRNA  April 28, 2025  11:36 AM

## 2025-04-28 NOTE — TELEPHONE ENCOUNTER
Omeprazole      Last Written Prescription Date:  2/24/25  Last Fill Quantity: 30,   # refills: 1  Last Office Visit: 2/24/25  Future Office visit:       Routing refill request to provider for review/approval because:

## 2025-04-28 NOTE — OP NOTE
REPORT OF OPERATION  DATE OF PROCEDURE: 4/28/2025    PATIENT: Mattie Edwards    SURGERY PERFORMED: Esophagogastroduodenoscopy with biopsies     PREOPERATIVE DIAGNOSIS: Dysphagia    POSTOPERATIVE DIAGNOSIS:    Normal Esophagogastroduodenoscopy   Squamous columnar junction at 35 cm    SURGEON: Sonny Laboy MD    ASSISTANTS: None    ANESTHESIA: Monitored Anesthesia Care    COMPLICATIONS: None apparent    ESTIMATED BLOOD LOSS: 5 mL    TRANSFUSIONS: None    TISSUE TO PATHOLOGY: Duodenal, Antral, and Distal Esophageal    FINDINGS: Normal Esophagogastroduodenoscopy    INDICATIONS: This is a 59 year old female in need of an Esophagogastroduodenoscopy for Dysphagia.  The patient will be taken to the endoscopy suite for that procedure.    DESCRIPTIONS OF PROCEDURE IN DETAIL: After consent was obtained the patient was taken to the operative suite and nikole in the left lateral decubitus position.  The patient was identified and the correct patient was confirmed. Monitored Anesthesia Care was given by anesthesia. A time out was performed verifying the correct patient and the correct procedure.  The entire operative team was in agreement.  All necessary equipment and supplies were in the room.    The endoscope was inserted into the mouth and passed without difficulty to the third portion of the duodenum.  Duodenal biopsies were taken.  The endoscope was then withdrawn through the duodenum, the duodenal bulb and pyloric channel and no abnormalities were noted.  The endoscope was brought back into the stomach and antral biopsies were obtained.  The endoscope was then retroflexed and no lesions of the fundus body or antrum were seen.  The endoscope was straightened back out and brought into the distal esophagus and a well-defined squamocolumnar junction was identified at 35 cm. Biopsies of the distal and mid esophagus were taken.  The endoscope was slowly withdrawn through the remaining esophagus no other abnormalities are  seen,  The endoscope was withdrawn from the patient the patient was then taken to the recovery room in stable condition tolerated the procedure well.

## 2025-04-29 LAB
PATH REPORT.COMMENTS IMP SPEC: NORMAL
PATH REPORT.FINAL DX SPEC: NORMAL
PATH REPORT.GROSS SPEC: NORMAL
PATH REPORT.MICROSCOPIC SPEC OTHER STN: NORMAL
PATH REPORT.RELEVANT HX SPEC: NORMAL
PHOTO IMAGE: NORMAL

## 2025-05-01 ENCOUNTER — OFFICE VISIT (OUTPATIENT)
Dept: OTOLARYNGOLOGY | Facility: OTHER | Age: 60
End: 2025-05-01
Attending: OTOLARYNGOLOGY
Payer: COMMERCIAL

## 2025-05-01 VITALS
SYSTOLIC BLOOD PRESSURE: 122 MMHG | HEART RATE: 64 BPM | DIASTOLIC BLOOD PRESSURE: 81 MMHG | OXYGEN SATURATION: 98 % | BODY MASS INDEX: 28.52 KG/M2 | TEMPERATURE: 97 F | HEIGHT: 62 IN | RESPIRATION RATE: 16 BRPM | WEIGHT: 154.98 LBS

## 2025-05-01 DIAGNOSIS — J34.89 INTERNAL NASAL LESION: ICD-10-CM

## 2025-05-01 DIAGNOSIS — E04.1 THYROID NODULE: Primary | ICD-10-CM

## 2025-05-01 DIAGNOSIS — R13.19 ESOPHAGEAL DYSPHAGIA: ICD-10-CM

## 2025-05-01 DIAGNOSIS — R09.A2 GLOBUS PHARYNGEUS: ICD-10-CM

## 2025-05-01 ASSESSMENT — PAIN SCALES - GENERAL: PAINLEVEL_OUTOF10: NO PAIN (0)

## 2025-05-01 NOTE — PROGRESS NOTES
Otolaryngology Consultation    Patient: Mattie Edwards  : 1965    Patient presents with:  Ent Problem: Thyroid nodule        HPI:  Mattie Edwards is a 59 year old female seen today at the request of Tracey Leary  for evaluation of a thyroid nodule.       This nodule was found incidentally.   The patient denies dysphonia  She has had 2 years solid dysphagia.   Recent normal EGD 25  Increased work stress ,  with DNR  Chronic mid throat globus  There is no first degree family history of thyroid cancer (paternal first cousin with thyroid cancer) and no personal history of head or neck irradiation.     The thyroid ultrasound was personally reviewed.  Dated 2025.  There is a left lower lobe 2.7 cm TR 4 nodule, previously 2.4 cm     There is a upper left pole subcentimeter TR 3 nodule     No adenopathy     FNAB dated 3/11/2016 left lobe was benign and consistent with lymphocytic Hashimoto's thyroiditis.     BUN and creatinine normal and calcium 9.2 on 2025,     Thyroid labs were reviewed.  TSH 1.33 on 2025 with normal BUN and creatinine and calcium of 9.2.     The patient denies tremor, hair loss or weight loss.     She also notes seasonal congestion and takes cetirizine as needed with good relief.  She notes right worse than left nasal obstruction.  No prior nasal trauma or prior nasal surgery.  Denies history of chronic sinusitis.      Chronic insomnia with daytime somnolence, no prior PSG       Current Outpatient Rx   Medication Sig Dispense Refill    cetirizine (ZYRTEC) 10 MG tablet Take 10 mg by mouth daily      meclizine (ANTIVERT) 25 MG tablet Take 1 tablet (25 mg) by mouth 3 times daily as needed for dizziness 30 tablet 0    neomycin-polymyxin-dexAMETHasone (MAXITROL) 3.5-04218-3.1 ophthalmic ointment Place 0.1429 Applications (0.5 g) into both eyes 4 times daily 7 g 1    rosuvastatin (CRESTOR) 10 MG tablet Take 1 tablet (10 mg) by mouth daily. 90 tablet 1     "SUMAtriptan (IMITREX) 100 MG tablet TAKE ONE TABLET BY MOUTH AS NEEDED 10 tablet 1    omeprazole (PRILOSEC) 20 MG DR capsule TAKE 1 CAPSULE(20 MG) BY MOUTH DAILY (Patient not taking: Reported on 5/1/2025) 90 capsule 0    thyroid (ARMOUR) 30 MG tablet TAKE 1 TABLET(30 MG) BY MOUTH DAILY (Patient not taking: Reported on 5/1/2025) 90 tablet 1       Allergies: Seasonal allergies     Past Medical History:   Diagnosis Date    Major depressive disorder, single episode, mild 9/19/2011    Thyroid disease        Past Surgical History:   Procedure Laterality Date    ESOPHAGOSCOPY, GASTROSCOPY, DUODENOSCOPY (EGD), COMBINED N/A 4/28/2025    Procedure: ESOPHAGOGASTRODUODENOSCOPY with biopsy;  Surgeon: Sonny Laboy MD;  Location: [facility]    NO HISTORY OF SURGERY      ORTHOPEDIC SURGERY  6/24/2024    ORIF surgery for broken ankle       ENT family history reviewed    Social History     Tobacco Use    Smoking status: Never    Smokeless tobacco: Never    Tobacco comments:     no passive exposure   Vaping Use    Vaping status: Never Used   Substance Use Topics    Alcohol use: No    Drug use: No       Review of Systems  ROS: 10 point ROS neg other than the symptoms noted above in the HPI and tinnitus congestion headaches    Physical Exam  /81 (BP Location: Left arm, Patient Position: Sitting, Cuff Size: Adult Regular)   Pulse 64   Temp 97  F (36.1  C) (Tympanic)   Resp 16   Ht 1.575 m (5' 2.01\")   Wt 70.3 kg (154 lb 15.7 oz)   LMP 05/15/2015   SpO2 98%   BMI 28.34 kg/m      General - The patient is well nourished and well developed, and appears to have good nutritional status.  Alert and oriented to person and place, answers questions and cooperates with examination appropriately.   Head and Face - Normocephalic and atraumatic, with no gross asymmetry noted.  The facial nerve is intact, with strong symmetric movements.  Voice and Breathing - The patient was breathing comfortably without the use of accessory muscles. " There was no wheezing, stridor, or stertor.  The patients voice was clear and strong, and had appropriate pitch and quality.  Ears -The external auditory canals are patent, the tympanic membranes are intact without effusion, retraction or mass.  Bony landmarks are intact.  Eyes - Extraocular movements intact, and the pupils were reactive to light.  Sclera were not icteric or injected, conjunctiva were pink and moist.  Mouth - Examination of the oral cavity showed pink, healthy oral mucosa. No lesions or ulcerations noted.  The tongue was mobile and midline, and the dentition were in good condition.    Throat - The walls of the oropharynx were smooth, pink, moist, symmetric, and had no lesions or ulcerations.  The tonsillar pillars and soft palate were symmetric.  The uvula was midline on elevation.  Lockhart Palate Position III, grade 2 tonsils  Neck - Normal midline excursion of the laryngotracheal complex during swallowing.  Full range of motion on passive movement.  Palpation of the occipital, submental, submandibular, internal jugular chain, and supraclavicular nodes did not demonstrate any abnormal lymph nodes or masses.  Palpation of the thyroid was irregular with mobile 2 cm left thyroid nodule.  The trachea was mobile and midline.  Nose - External contour is symmetric, no gross deflection or scars.  Narrow nasal vault.  There is a left anterior nasal erythematous mucosal mass versus paradoxical polypoid /anterior extension of the inferior turbinate.  No bleeding or ulceration photos in epic   nasal mucosa is pink and moist with no abnormal mucus.  The septum was intact, turbinates of normal size and position.  No  purulence noted on examination.    Attempts at mirror laryngoscopy were not possible due to gag reflex.  Therefore I proceeded with a fiberoptic examination after informed consent.  First I sprayed both sides of the nose with a mixture of lidocaine and neosynephrine.  I then passed the scope  through the nasal cavity.  The nasal cavity was unremarkable.  The nasopharynx was mucosally covered and symmetric.  The eustachian tube openings were unobstructed.  Going further, the base of tongue was free of lesions, and the vallecula was open.  The epiglottis was smooth and mucosally covered.  The supraglottic larynx was then clearly visualized.  The vocal cords moved smoothly and symmetrically.  The pyriform sinuses were open and without monique mass or pooling of secretions, and the limited view of the postcricoid region did not show any lesions.  The patient tolerated the procedure well.      Impression and Plan- Mattie Edwards is a 59 year old female with:    ICD-10-CM    1. Left Thyroid nodule  E04.1 lidocaine 2%-oxymetazoline 0.025% nasal solution 2 spray      2. Globus pharyngeus  R09.A2 lidocaine 2%-oxymetazoline 0.025% nasal solution 2 spray      3. Esophageal dysphagia  R13.19       4. Internal nasal lesion, left  J34.89               Prior benign left lobe FNA on 3/11/2016 with stable imaging    It is unlikely that thyroid nodule is causing her dysphagia and globus.  Recommend esophagram and sleep study if symptoms worsen.  Dysphagia improved with PPI and negative EGD.    She declined esophogram    Continue to monitor for monique apnea.    Annual US with RENA Julio messaged her      3 month repeat left nasal exam, don't need repeat laryngoscope, possible biopsy  Any type of nasal saline and ayr gel          Qing Grimm D.O.  Otolaryngology/Head and Neck Surgery  Allergy

## 2025-05-01 NOTE — PATIENT INSTRUCTIONS
Any type of nasal saline and ayr gel  3 months with Dr. Grimm for left nasal exam, don't need repeat laryngoscope  Annual US with Stacie Will, I messaged her    Thank you for allowing Dr. Grimm and our ENT team to participate in your care.  If your medications are too expensive, please give the nurse a call.  We can possibly change this medication.  If you have a scheduling or an appointment question please contact our Health Unit Coordinator at their direct line [phone].   ALL nursing questions or concerns can be directed to your ENT nurse, Cong, at: [phone]

## 2025-05-01 NOTE — LETTER
2025      Mattie Edwards  [address] [place] [address]      Dear Colleague,    Thank you for referring your patient, Mattie Edwards, to the [facility]. Please see a copy of my visit note below.      Otolaryngology Consultation    Patient: Mattie Edwards  : 1965    Patient presents with:  Ent Problem: Thyroid nodule        HPI:  Mattie Edwards is a 59 year old female seen today at the request of Tracey Leary  for evaluation of a thyroid nodule.       This nodule was found incidentally.   The patient denies dysphonia  She has had 2 years solid dysphagia.   Recent normal EGD 25  Increased work stress ,  with DNR  Chronic mid throat globus  There is no first degree family history of thyroid cancer (paternal first cousin with thyroid cancer) and no personal history of head or neck irradiation.     The thyroid ultrasound was personally reviewed.  Dated 2025.  There is a left lower lobe 2.7 cm TR 4 nodule, previously 2.4 cm     There is a upper left pole subcentimeter TR 3 nodule     No adenopathy     FNAB dated 3/11/2016 left lobe was benign and consistent with lymphocytic Hashimoto's thyroiditis.     BUN and creatinine normal and calcium 9.2 on 2025,     Thyroid labs were reviewed.  TSH 1.33 on 2025 with normal BUN and creatinine and calcium of 9.2.     The patient denies tremor, hair loss or weight loss.     She also notes seasonal congestion and takes cetirizine as needed with good relief.  She notes right worse than left nasal obstruction.  No prior nasal trauma or prior nasal surgery.  Denies history of chronic sinusitis.      Chronic insomnia with daytime somnolence, no prior PSG       Current Outpatient Rx   Medication Sig Dispense Refill     cetirizine (ZYRTEC) 10 MG tablet Take 10 mg by mouth daily       meclizine (ANTIVERT) 25 MG tablet Take 1 tablet (25 mg) by mouth 3 times daily as needed for dizziness 30 tablet 0  "    neomycin-polymyxin-dexAMETHasone (MAXITROL) 3.5-55173-9.1 ophthalmic ointment Place 0.1429 Applications (0.5 g) into both eyes 4 times daily 7 g 1     rosuvastatin (CRESTOR) 10 MG tablet Take 1 tablet (10 mg) by mouth daily. 90 tablet 1     SUMAtriptan (IMITREX) 100 MG tablet TAKE ONE TABLET BY MOUTH AS NEEDED 10 tablet 1     omeprazole (PRILOSEC) 20 MG DR capsule TAKE 1 CAPSULE(20 MG) BY MOUTH DAILY (Patient not taking: Reported on 5/1/2025) 90 capsule 0     thyroid (ARMOUR) 30 MG tablet TAKE 1 TABLET(30 MG) BY MOUTH DAILY (Patient not taking: Reported on 5/1/2025) 90 tablet 1       Allergies: Seasonal allergies     Past Medical History:   Diagnosis Date     Major depressive disorder, single episode, mild 9/19/2011     Thyroid disease        Past Surgical History:   Procedure Laterality Date     ESOPHAGOSCOPY, GASTROSCOPY, DUODENOSCOPY (EGD), COMBINED N/A 4/28/2025    Procedure: ESOPHAGOGASTRODUODENOSCOPY with biopsy;  Surgeon: Sonny Laboy MD;  Location: [facility]     NO HISTORY OF SURGERY       ORTHOPEDIC SURGERY  6/24/2024    ORIF surgery for broken ankle       ENT family history reviewed    Social History     Tobacco Use     Smoking status: Never     Smokeless tobacco: Never     Tobacco comments:     no passive exposure   Vaping Use     Vaping status: Never Used   Substance Use Topics     Alcohol use: No     Drug use: No       Review of Systems  ROS: 10 point ROS neg other than the symptoms noted above in the HPI and tinnitus congestion headaches    Physical Exam  /81 (BP Location: Left arm, Patient Position: Sitting, Cuff Size: Adult Regular)   Pulse 64   Temp 97  F (36.1  C) (Tympanic)   Resp 16   Ht 1.575 m (5' 2.01\")   Wt 70.3 kg (154 lb 15.7 oz)   LMP 05/15/2015   SpO2 98%   BMI 28.34 kg/m      General - The patient is well nourished and well developed, and appears to have good nutritional status.  Alert and oriented to person and place, answers questions and cooperates with " examination appropriately.   Head and Face - Normocephalic and atraumatic, with no gross asymmetry noted.  The facial nerve is intact, with strong symmetric movements.  Voice and Breathing - The patient was breathing comfortably without the use of accessory muscles. There was no wheezing, stridor, or stertor.  The patients voice was clear and strong, and had appropriate pitch and quality.  Ears -The external auditory canals are patent, the tympanic membranes are intact without effusion, retraction or mass.  Bony landmarks are intact.  Eyes - Extraocular movements intact, and the pupils were reactive to light.  Sclera were not icteric or injected, conjunctiva were pink and moist.  Mouth - Examination of the oral cavity showed pink, healthy oral mucosa. No lesions or ulcerations noted.  The tongue was mobile and midline, and the dentition were in good condition.    Throat - The walls of the oropharynx were smooth, pink, moist, symmetric, and had no lesions or ulcerations.  The tonsillar pillars and soft palate were symmetric.  The uvula was midline on elevation.  Lockhart Palate Position III, grade 2 tonsils  Neck - Normal midline excursion of the laryngotracheal complex during swallowing.  Full range of motion on passive movement.  Palpation of the occipital, submental, submandibular, internal jugular chain, and supraclavicular nodes did not demonstrate any abnormal lymph nodes or masses.  Palpation of the thyroid was irregular with mobile 2 cm left thyroid nodule.  The trachea was mobile and midline.  Nose - External contour is symmetric, no gross deflection or scars.  Narrow nasal vault.  There is a left anterior nasal erythematous mucosal mass versus paradoxical polypoid /anterior extension of the inferior turbinate.  No bleeding or ulceration photos in epic   nasal mucosa is pink and moist with no abnormal mucus.  The septum was intact, turbinates of normal size and position.  No  purulence noted on  examination.    Attempts at mirror laryngoscopy were not possible due to gag reflex.  Therefore I proceeded with a fiberoptic examination after informed consent.  First I sprayed both sides of the nose with a mixture of lidocaine and neosynephrine.  I then passed the scope through the nasal cavity.  The nasal cavity was unremarkable.  The nasopharynx was mucosally covered and symmetric.  The eustachian tube openings were unobstructed.  Going further, the base of tongue was free of lesions, and the vallecula was open.  The epiglottis was smooth and mucosally covered.  The supraglottic larynx was then clearly visualized.  The vocal cords moved smoothly and symmetrically.  The pyriform sinuses were open and without monique mass or pooling of secretions, and the limited view of the postcricoid region did not show any lesions.  The patient tolerated the procedure well.      Impression and Plan- Mattie Edwards is a 59 year old female with:    ICD-10-CM    1. Left Thyroid nodule  E04.1 lidocaine 2%-oxymetazoline 0.025% nasal solution 2 spray      2. Globus pharyngeus  R09.A2 lidocaine 2%-oxymetazoline 0.025% nasal solution 2 spray      3. Esophageal dysphagia  R13.19       4. Internal nasal lesion, left  J34.89               Prior benign left lobe FNA on 3/11/2016 with stable imaging    It is unlikely that thyroid nodule is causing her dysphagia and globus.  Recommend esophagram and sleep study if symptoms worsen.  These were discussed today she declined.  Continue to monitor for monique apnea.    Annual US with Stacie Will, I messaged her      3 month repeat left nasal exam, don't need repeat laryngoscope, possible biopsy  Any type of nasal saline and ayr gel          Qing Grimm D.O.  Otolaryngology/Head and Neck Surgery  Allergy        Again, thank you for allowing me to participate in the care of your patient.        Sincerely,        Qing Grimm MD    Electronically signed

## 2025-05-26 NOTE — PROGRESS NOTES
Assessment & Plan     (Z76.89) Encounter to establish care  (primary encounter diagnosis)  Plan: Patient is in need of a new provider. she has been explained the role of a CNP and the fact that I do not follow patients in the hospital. she was told that should he get admitted, he would then be followed by a hospitalist. she verbalizes understanding and would like to establish a relationship now.     (E78.5) Hyperlipidemia LDL goal <100  Comment: tolerating Crestor  Plan: CMP and lipids in process. Will notify patient of the results when available and intervene accordingly.     (E03.8) hypothyroidism  Comment: recent TSH normal  Plan: C/W [place] Thyroid. If TSH becomes hard to control, will send to endocrinology.     (F32.0) Major depressive disorder, single episode, mild  Comment: controlled  Plan: Continue to monitor.     (G43.719) Intractable chronic migraine without aura and without status migrainosus  Comment: tolerable  Plan: C/W current medications.     (J30.89) Perennial allergic rhinitis  Comment: controlled  Plan: Continue Zyrtec.     (E04.1) Left Thyroid nodules  Plan: due for US in 5/2026    The longitudinal plan of care for the diagnosis(es)/condition(s) as documented were addressed during this visit. Due to the added complexity in care, I will continue to support Mattie in the subsequent management and with ongoing continuity of care.    Subjective   Mattie is a 59 year old, presenting for the following health issues:  Establish Care, Lipids, Anxiety, and Depression    History of Present Illness       Reason for visit:  Introduction to new care provider   She is taking medications regularly.        NEW PATIENT  At this time, past medical history, current medications, allergies and drug sensitivities, immunizations, habits and life style, family history, and social history are reviewed and updated.    Due for a mammogram. Scheduled for Friday May 30th.    Hyperlipidemia Follow-Up    Are you  regularly taking any medication or supplement to lower your cholesterol?   Yes- Crestor 10 mg - recently was started  Are you having muscle aches or other side effects that you think could be caused by your cholesterol lowering medication? Yes, some fatigue  Denies chest pain, shortness of breath, dizziness, syncope, or palpitations.      Depression and Anxiety   How are you doing with your depression since your last visit? No change  How are you doing with your anxiety since your last visit?  No change  Are you having other symptoms that might be associated with depression or anxiety? No  Have you had a significant life event? No   Do you have any concerns with your use of alcohol or other drugs? No  No thoughts of self harm.     Seasonal Allergies controlled with Zyrtec.     Social History     Tobacco Use    Smoking status: Never     Passive exposure: Never    Smokeless tobacco: Never    Tobacco comments:     no passive exposure   Vaping Use    Vaping status: Never Used   Substance Use Topics    Alcohol use: No    Drug use: No         2/16/2023    10:17 AM 2/22/2024     2:21 PM 2/24/2025    10:33 AM   PHQ   PHQ-9 Total Score 4 4 6    Q9: Thoughts of better off dead/self-harm past 2 weeks Not at all Not at all Not at all       Patient-reported         2/16/2023    10:19 AM 2/22/2024     2:24 PM 2/24/2025    10:34 AM   MARILYN-7 SCORE   Total Score  4 (minimal anxiety) 4 (minimal anxiety)   Total Score 4 4 4        Patient-reported         2/24/2025    10:33 AM   Last PHQ-9   1.  Little interest or pleasure in doing things 1   2.  Feeling down, depressed, or hopeless 1   3.  Trouble falling or staying asleep, or sleeping too much 3   4.  Feeling tired or having little energy 1   5.  Poor appetite or overeating 0   6.  Feeling bad about yourself 0   7.  Trouble concentrating 0   8.  Moving slowly or restless 0   Q9: Thoughts of better off dead/self-harm past 2 weeks 0   PHQ-9 Total Score 6        Patient-reported          "2/24/2025    10:34 AM   MARILYN-7    1. Feeling nervous, anxious, or on edge 1   2. Not being able to stop or control worrying 1   3. Worrying too much about different things 0   4. Trouble relaxing 0   5. Being so restless that it is hard to sit still 0   6. Becoming easily annoyed or irritable 1   7. Feeling afraid, as if something awful might happen 1   MARILYN-7 Total Score 4    If you checked any problems, how difficult have they made it for you to do your work, take care of things at home, or get along with other people? Not difficult at all       Patient-reported       956}  Migraine   Since your last clinic visit, how have your headaches changed?  Improved  How often are you getting headaches or migraines? 1x every 2 weeks    Are you able to do normal daily activities when you have a migraine? Depends on the headache   Are you taking rescue/relief medications? (Select all that apply) sumatriptan (Imitrex), excedrin migraine has been working well   How helpful is your rescue/relief medication?  Has not been using it   Are you taking any medications to prevent migraines? (Select all that apply)  No  In the past 4 weeks, how often have you gone to urgent care or the emergency room because of your headaches?  0    Hypothyroidism Follow-up    Since last visit, patient describes the following symptoms: Weight stable, no hair loss, no skin changes, no constipation, no loose stools  Taking [place] Thyroid daily.         Review of Systems  Constitutional, HEENT, cardiovascular, pulmonary, gi and gu systems are negative, except as otherwise noted.      Objective    /80 (BP Location: Right arm, Patient Position: Sitting, Cuff Size: Adult Regular)   Pulse 67   Temp 97.3  F (36.3  C) (Tympanic)   Resp 20   Ht 1.549 m (5' 1\")   Wt 71.9 kg (158 lb 8 oz)   LMP 05/15/2015   SpO2 98%   BMI 29.95 kg/m    Body mass index is 29.95 kg/m .  Physical Exam   GENERAL: alert and no distress  EYES: Eyes grossly normal to " inspection, PERRL and conjunctivae and sclerae normal  HENT: ear canals and TM's normal, nose and mouth without ulcers or lesions  NECK: no adenopathy, no asymmetry, masses, or scars  RESP: lungs clear to auscultation - no rales, rhonchi or wheezes  CV: regular rate and rhythm, no murmur, click or rub, no peripheral edema  ABDOMEN: soft, nontender, no hepatosplenomegaly, no masses and bowel sounds normal  MS: no gross musculoskeletal defects noted, no edema  NEURO: Normal strength and tone, mentation intact and speech normal  PSYCH: mentation appears normal, affect normal/bright      Labs in process.         Signed Electronically by: Stacie Will NP

## 2025-05-28 ENCOUNTER — LAB (OUTPATIENT)
Dept: LAB | Facility: OTHER | Age: 60
End: 2025-05-28
Attending: NURSE PRACTITIONER
Payer: COMMERCIAL

## 2025-05-28 ENCOUNTER — RESULTS FOLLOW-UP (OUTPATIENT)
Dept: FAMILY MEDICINE | Facility: OTHER | Age: 60
End: 2025-05-28

## 2025-05-28 ENCOUNTER — OFFICE VISIT (OUTPATIENT)
Dept: FAMILY MEDICINE | Facility: OTHER | Age: 60
End: 2025-05-28
Attending: NURSE PRACTITIONER
Payer: COMMERCIAL

## 2025-05-28 VITALS
SYSTOLIC BLOOD PRESSURE: 120 MMHG | HEART RATE: 67 BPM | OXYGEN SATURATION: 98 % | TEMPERATURE: 97.3 F | RESPIRATION RATE: 20 BRPM | WEIGHT: 158.5 LBS | DIASTOLIC BLOOD PRESSURE: 80 MMHG | BODY MASS INDEX: 29.92 KG/M2 | HEIGHT: 61 IN

## 2025-05-28 DIAGNOSIS — F32.0 MAJOR DEPRESSIVE DISORDER, SINGLE EPISODE, MILD: ICD-10-CM

## 2025-05-28 DIAGNOSIS — E78.5 HYPERLIPIDEMIA LDL GOAL <100: ICD-10-CM

## 2025-05-28 DIAGNOSIS — J30.89 PERENNIAL ALLERGIC RHINITIS: ICD-10-CM

## 2025-05-28 DIAGNOSIS — G43.719 INTRACTABLE CHRONIC MIGRAINE WITHOUT AURA AND WITHOUT STATUS MIGRAINOSUS: ICD-10-CM

## 2025-05-28 DIAGNOSIS — Z76.89 ENCOUNTER TO ESTABLISH CARE: Primary | ICD-10-CM

## 2025-05-28 DIAGNOSIS — E03.8 OTHER SPECIFIED HYPOTHYROIDISM: ICD-10-CM

## 2025-05-28 DIAGNOSIS — E04.1 THYROID NODULE: ICD-10-CM

## 2025-05-28 LAB
ALT SERPL W P-5'-P-CCNC: 30 U/L (ref 0–50)
AST SERPL W P-5'-P-CCNC: 25 U/L (ref 0–45)
CHOLEST SERPL-MCNC: 154 MG/DL
FASTING STATUS PATIENT QL REPORTED: YES
HDLC SERPL-MCNC: 68 MG/DL
LDLC SERPL CALC-MCNC: 73 MG/DL
NONHDLC SERPL-MCNC: 86 MG/DL
TRIGL SERPL-MCNC: 65 MG/DL

## 2025-05-28 PROCEDURE — 84460 ALANINE AMINO (ALT) (SGPT): CPT

## 2025-05-28 PROCEDURE — 84450 TRANSFERASE (AST) (SGOT): CPT

## 2025-05-28 PROCEDURE — 80061 LIPID PANEL: CPT

## 2025-05-28 PROCEDURE — 36415 COLL VENOUS BLD VENIPUNCTURE: CPT

## 2025-05-28 RX ORDER — THYROID 30 MG/1
30 TABLET ORAL DAILY
Qty: 90 TABLET | Refills: 3 | Status: SHIPPED | OUTPATIENT
Start: 2025-05-28

## 2025-05-28 ASSESSMENT — PAIN SCALES - GENERAL: PAINLEVEL_OUTOF10: NO PAIN (0)

## 2025-07-27 DIAGNOSIS — R11.11 VOMITING WITHOUT NAUSEA, UNSPECIFIED VOMITING TYPE: ICD-10-CM

## 2025-07-28 RX ORDER — OMEPRAZOLE 20 MG/1
20 CAPSULE, DELAYED RELEASE ORAL DAILY
Qty: 90 CAPSULE | Refills: 0 | Status: SHIPPED | OUTPATIENT
Start: 2025-07-28

## 2025-07-28 NOTE — TELEPHONE ENCOUNTER
omeprazole (PRILOSEC) 20 MG DR capsule         Last Written Prescription Date:  4/28/25  Last Fill Quantity: 90,   # refills: 0  Last Office Visit: 5/28/25  Future Office visit:       Routing refill request to provider for review/approval because:  PPI Protocol Failed      Medication indicated for associated diagnosis    The medication is prescribed for one or more of the following conditions:                Erosive esophagitis               Gastritis              Gastric hypersecretion              Gastric ulcer              Gastroesophageal reflux disease              Helicobacter pylori gastrointestinal tract infection              Ulcer of duodenum              Drug-induced peptic ulcer              Esophageal stricture              Gastrointestinal hemorrhage              Indigestion              Stress ulcer              Zollinger-Soriano syndrome              Gallagher s esophagus              Laryngopharyngeal reflux              Epigastric Pain              Morbid Obesity              Cough              History of Peptic Ulcer              Esophageal Atresia, Stenosis and Fistula              Cystic Fibrosis  Bronchiectasis

## 2025-08-26 ENCOUNTER — MYC REFILL (OUTPATIENT)
Dept: FAMILY MEDICINE | Facility: OTHER | Age: 60
End: 2025-08-26

## 2025-08-26 DIAGNOSIS — E78.5 HYPERLIPIDEMIA LDL GOAL <100: ICD-10-CM

## 2025-08-26 RX ORDER — ROSUVASTATIN CALCIUM 10 MG/1
10 TABLET, COATED ORAL DAILY
Qty: 90 TABLET | Refills: 1 | OUTPATIENT
Start: 2025-08-26

## 2025-08-26 RX ORDER — ROSUVASTATIN CALCIUM 10 MG/1
10 TABLET, COATED ORAL DAILY
Qty: 90 TABLET | Refills: 2 | Status: SHIPPED | OUTPATIENT
Start: 2025-08-26

## (undated) DEVICE — JELLY LUBRICATING SURGILUBE 2OZ TUBE

## (undated) DEVICE — TUBING SUCTION 20FT N620A

## (undated) DEVICE — SYR 30ML SLIP TIP W/O NDL 302833

## (undated) DEVICE — SOL WATER IRRIG 1000ML BOTTLE 2F7114

## (undated) DEVICE — SUCTION MANIFOLD NEPTUNE 2 SYS 1 PORT 702-025-000

## (undated) DEVICE — CONNECTOR ERBEFLO 2 PORT 20325-215

## (undated) DEVICE — FORCEP COLON BIOPSY STD W/NEEDLE 160CM M00513390

## (undated) DEVICE — ENDO BITE BLOCK ADULT OLYMPUS LATEX FREE MAJ-1632

## (undated) DEVICE — CANISTER SUCTION MEDI-VAC GUARDIAN 2000ML 90D 65651-220